# Patient Record
Sex: FEMALE | Race: WHITE | NOT HISPANIC OR LATINO | Employment: FULL TIME | URBAN - METROPOLITAN AREA
[De-identification: names, ages, dates, MRNs, and addresses within clinical notes are randomized per-mention and may not be internally consistent; named-entity substitution may affect disease eponyms.]

---

## 2017-08-15 RX ORDER — BUPROPION HYDROCHLORIDE 75 MG/1
75 TABLET ORAL 2 TIMES DAILY
Qty: 60 TABLET | Refills: 0 | Status: SHIPPED | OUTPATIENT
Start: 2017-08-15 | End: 2017-08-25 | Stop reason: SDUPTHER

## 2017-08-25 ENCOUNTER — OFFICE VISIT (OUTPATIENT)
Dept: FAMILY MEDICINE CLINIC | Facility: CLINIC | Age: 47
End: 2017-08-25

## 2017-08-25 VITALS
OXYGEN SATURATION: 98 % | DIASTOLIC BLOOD PRESSURE: 76 MMHG | HEART RATE: 97 BPM | TEMPERATURE: 97.6 F | SYSTOLIC BLOOD PRESSURE: 118 MMHG | HEIGHT: 65 IN | BODY MASS INDEX: 26.42 KG/M2 | WEIGHT: 158.6 LBS

## 2017-08-25 DIAGNOSIS — G43.839 INTRACTABLE MENSTRUAL MIGRAINE WITHOUT STATUS MIGRAINOSUS: ICD-10-CM

## 2017-08-25 DIAGNOSIS — F41.9 ANXIETY: ICD-10-CM

## 2017-08-25 DIAGNOSIS — Z00.00 WELLNESS EXAMINATION: Primary | ICD-10-CM

## 2017-08-25 LAB
ALBUMIN SERPL-MCNC: 4.2 G/DL (ref 3.2–4.8)
ALBUMIN/GLOB SERPL: 1.3 G/DL (ref 1.5–2.5)
ALP SERPL-CCNC: 65 U/L (ref 25–100)
ALT SERPL-CCNC: 14 U/L (ref 7–40)
AST SERPL-CCNC: 21 U/L (ref 0–33)
BILIRUB SERPL-MCNC: 0.4 MG/DL (ref 0.3–1.2)
BUN SERPL-MCNC: 10 MG/DL (ref 9–23)
BUN/CREAT SERPL: 14.3 (ref 7–25)
CALCIUM SERPL-MCNC: 10.6 MG/DL (ref 8.7–10.4)
CHLORIDE SERPL-SCNC: 106 MMOL/L (ref 99–109)
CHOLEST SERPL-MCNC: 183 MG/DL (ref 0–200)
CO2 SERPL-SCNC: 24 MMOL/L (ref 20–31)
CREAT SERPL-MCNC: 0.7 MG/DL (ref 0.6–1.3)
GLOBULIN SER CALC-MCNC: 3.2 GM/DL
GLUCOSE SERPL-MCNC: 89 MG/DL (ref 70–100)
HDLC SERPL-MCNC: 69 MG/DL (ref 40–60)
LDLC SERPL CALC-MCNC: 99 MG/DL (ref 0–100)
POTASSIUM SERPL-SCNC: 4.5 MMOL/L (ref 3.5–5.5)
PROT SERPL-MCNC: 7.4 G/DL (ref 5.7–8.2)
SODIUM SERPL-SCNC: 141 MMOL/L (ref 132–146)
TRIGL SERPL-MCNC: 77 MG/DL (ref 0–150)
TSH SERPL DL<=0.005 MIU/L-ACNC: 1.63 MIU/ML (ref 0.35–5.35)
VLDLC SERPL CALC-MCNC: 15.4 MG/DL

## 2017-08-25 PROCEDURE — 99396 PREV VISIT EST AGE 40-64: CPT | Performed by: NURSE PRACTITIONER

## 2017-08-25 RX ORDER — SUMATRIPTAN 50 MG/1
TABLET, FILM COATED ORAL
Qty: 8 TABLET | Refills: 1 | Status: SHIPPED | OUTPATIENT
Start: 2017-08-25 | End: 2017-11-17 | Stop reason: SDUPTHER

## 2017-08-25 RX ORDER — BUPROPION HYDROCHLORIDE 75 MG/1
150 TABLET ORAL 2 TIMES DAILY
Qty: 120 TABLET | Refills: 11 | Status: SHIPPED | OUTPATIENT
Start: 2017-08-25 | End: 2017-09-24

## 2017-08-25 NOTE — PROGRESS NOTES
Patient Care Team:  Francy Burleson MD as PCP - General (Rheumatology)     Chief complaint: Patient is in today for a physical     Patient is a 46 y.o. female who presents for her yearly physical exam.     ANGELA Tam is here today for annual physical and to get refills for Wellbutrin and Imitrex.  States she has had vision changes and eye pain with movement since January, has seen a specialist, but no problems have been found, but the plan is to try different prescription lenses, if this does not help, they will refer her to neurology.  Patient has history of uterine ablation, still has monthly periods, but states PAP/pelvic exams are too painful and will not have it done, states she is on her period today. Patient has migraine headaches associated with her menstrual cycle, that are controlled with Imitrex.   Patient has a history of anxiety that is controlled well with Wellbutrin.   Patient has Lupus and Fibromyalgia that are managed by a Rheumatologist. Patient is limited on how much exercise she can do because of pain related to her conditions. She does try to eat healthy diet.   Review of Systems   Constitutional: Negative for activity change, appetite change, chills, diaphoresis, fatigue, fever and unexpected weight change.   HENT: Negative for congestion, ear pain, sinus pressure and sore throat.    Eyes: Positive for pain and visual disturbance (since January, has seen specialist).   Respiratory: Negative for cough, shortness of breath and wheezing.    Cardiovascular: Negative for chest pain, palpitations and leg swelling.   Gastrointestinal: Negative for abdominal distention, abdominal pain, blood in stool, constipation, diarrhea, nausea and vomiting.   Endocrine: Negative for cold intolerance, heat intolerance, polydipsia and polyuria.   Genitourinary: Negative for decreased urine volume, difficulty urinating, dysuria, flank pain, frequency, menstrual problem, pelvic pain, urgency, vaginal discharge  and vaginal pain.   Musculoskeletal: Positive for arthralgias (lupus), back pain and myalgias.   Skin: Negative for color change, pallor, rash and wound.   Allergic/Immunologic: Positive for environmental allergies.   Neurological: Positive for headaches (migraines around monthly period). Negative for dizziness, tremors, seizures, light-headedness and numbness.   Psychiatric/Behavioral: Negative for agitation and behavioral problems. The patient is nervous/anxious.       History  Past Medical History:   Diagnosis Date   • Anxiety    • Arthritis     DDD   • Depression    • Fibromyalgia    • IBS (irritable bowel syndrome)    • Lupus    • Migraine headache    • Tendinitis       Past Surgical History:   Procedure Laterality Date   • CHOLECYSTECTOMY     • DIAGNOSTIC LAPAROSCOPY     • ENDOMETRIAL ABLATION     • LASIK     • MANDIBLE SURGERY      and Reset      Allergies   Allergen Reactions   • No Known Drug Allergy       Family History   Problem Relation Age of Onset   • Multiple sclerosis Mother    • Heart attack Father    • Arthritis Sister    • Depression Daughter    • Obesity Daughter    • No Known Problems Son      Social History     Social History   • Marital status:      Spouse name: N/A   • Number of children: N/A   • Years of education: N/A     Occupational History   • Not on file.     Social History Main Topics   • Smoking status: Never Smoker   • Smokeless tobacco: Never Used   • Alcohol use No   • Drug use: No   • Sexual activity: Yes     Partners: Male     Other Topics Concern   • Not on file     Social History Narrative   • No narrative on file        Current Outpatient Prescriptions:   •  buPROPion (WELLBUTRIN) 75 MG tablet, Take 2 tablets by mouth 2 (Two) Times a Day for 30 days., Disp: 120 tablet, Rfl: 11  •  Cholecalciferol (VITAMIN D) 2000 units capsule, Take  by mouth., Disp: , Rfl:   •  cycloSPORINE (RESTASIS) 0.05 % ophthalmic emulsion, Apply  to eye., Disp: , Rfl:   •  gabapentin (NEURONTIN)  "100 MG capsule, Take 300 mg by mouth Every Night., Disp: , Rfl:   •  Ibuprofen 200 MG capsule, Take  by mouth., Disp: , Rfl:   •  lansoprazole (PREVACID) 30 MG capsule, , Disp: , Rfl: 0  •  pravastatin (PRAVACHOL) 10 MG tablet, Take  by mouth Daily., Disp: , Rfl:   •  SUMAtriptan (IMITREX) 50 MG tablet, Take 1 tablet, may repeat dose in 2 hrs if needed, Disp: 8 tablet, Rfl: 1  •  traMADol (ULTRAM) 50 MG tablet, Take  by mouth 3 (Three) Times a Day., Disp: , Rfl:                                               There is no immunization history on file for this patient.  Health Maintenance   Topic Date Due   • TDAP/TD VACCINES (1 - Tdap) 12/14/1991   • INFLUENZA VACCINE  09/01/2017   • PAP SMEAR  08/25/2020        No results found for this or any previous visit.         /76  Pulse 97  Temp 97.6 °F (36.4 °C) (Temporal Artery )   Ht 65\" (165.1 cm)  Wt 158 lb 9.6 oz (71.9 kg)  LMP 08/24/2017  SpO2 98%  Breastfeeding? No  BMI 26.39 kg/m2      Physical Exam   Constitutional: She is oriented to person, place, and time. She appears well-developed and well-nourished.   HENT:   Head: Normocephalic and atraumatic.   Right Ear: External ear normal.   Left Ear: External ear normal. Tympanic membrane is not bulging.   Nose: Nose normal.   Mouth/Throat: Oropharynx is clear and moist. No oropharyngeal exudate.   Right ear TM is not visible due to cerumen   Eyes: Conjunctivae and EOM are normal. Pupils are equal, round, and reactive to light. No scleral icterus.   Neck: Normal range of motion. Neck supple. No JVD present. No tracheal deviation present. No thyromegaly present.   Cardiovascular: Normal rate, regular rhythm, normal heart sounds and intact distal pulses.  Exam reveals no friction rub.    No murmur heard.  No carotid bruit   Pulmonary/Chest: Effort normal and breath sounds normal. No respiratory distress. She has no wheezes.   Declined breast exam   Abdominal: Soft. Bowel sounds are normal. She exhibits no " distension and no mass. There is no tenderness. No hernia.   35 in waist circumference (needed for work physical)  No aortic or renal artery bruit   Genitourinary:   Genitourinary Comments: Patient declines, is on period at this time   Musculoskeletal: She exhibits no edema, tenderness or deformity.   Lymphadenopathy:     She has no cervical adenopathy.   Neurological: She is alert and oriented to person, place, and time. She has normal reflexes. No cranial nerve deficit. Coordination normal.   Skin: Skin is warm and dry. No rash noted. No erythema. No pallor.   Psychiatric: She has a normal mood and affect. Her behavior is normal. Judgment and thought content normal.   Vitals reviewed.          Counseling provided on diet and nutrition and exercise.    Diagnoses and all orders for this visit:    Wellness examination  -     Comprehensive metabolic panel; Future  -     TSH; Future  -     Lipid panel; Future    Intractable menstrual migraine without status migrainosus  -     SUMAtriptan (IMITREX) 50 MG tablet; Take 1 tablet, may repeat dose in 2 hrs if needed    Anxiety  -     buPROPion (WELLBUTRIN) 75 MG tablet; Take 2 tablets by mouth 2 (Two) Times a Day for 30 days.       Screening labs ordered to evaluate glucose, kidney, liver, and thyroid function.  I will contact patient regarding test results and provide instructions regarding any necessary changes in plan of care.  Migraines are stable, Imitrex refilled today.  Anxiety is stable, Wellbutrin refilled.  I discussed the importance of regular screening exams, encouraged patient to have a PAP especially since uterine ablation often ends uterine bleeding and she is still having periods. Patient verbalized understanding and will consider it.  Nutrition and activity goals reviewed including: mainly water to drink, limit white flour/processed sugar, and processed foods, choose fresh fruits, vegetables, fish, lean meats,high fiber carbs, exercise  working toward 150  mins cardio per week, weight training 2x/week.  I advised patient to use sweet oil drops in right ear at night, use debrox or hydrogen peroxide mixed half and half with warm water to flush out wax or patient may also come to office to have ar irrigation done.  Patient was encouraged to keep me informed of any acute changes, lack of improvement, or any new concerning symptoms.Patient voiced understanding of all instructions and denied further questions.      .VINH Shepard   8/25/2017   1:33 PM

## 2017-08-30 ENCOUNTER — RESULTS ENCOUNTER (OUTPATIENT)
Dept: FAMILY MEDICINE CLINIC | Facility: CLINIC | Age: 47
End: 2017-08-30

## 2017-08-30 DIAGNOSIS — Z00.00 WELLNESS EXAMINATION: ICD-10-CM

## 2017-11-17 DIAGNOSIS — G43.839 INTRACTABLE MENSTRUAL MIGRAINE WITHOUT STATUS MIGRAINOSUS: ICD-10-CM

## 2017-11-17 RX ORDER — SUMATRIPTAN 50 MG/1
TABLET, FILM COATED ORAL
Qty: 8 TABLET | Refills: 1 | Status: SHIPPED | OUTPATIENT
Start: 2017-11-17 | End: 2018-01-22 | Stop reason: SDUPTHER

## 2018-01-22 DIAGNOSIS — G43.839 INTRACTABLE MENSTRUAL MIGRAINE WITHOUT STATUS MIGRAINOSUS: ICD-10-CM

## 2018-01-23 RX ORDER — SUMATRIPTAN 50 MG/1
TABLET, FILM COATED ORAL
Qty: 8 TABLET | Refills: 2 | Status: SHIPPED | OUTPATIENT
Start: 2018-01-23 | End: 2018-05-01 | Stop reason: SDUPTHER

## 2018-05-01 DIAGNOSIS — G43.839 INTRACTABLE MENSTRUAL MIGRAINE WITHOUT STATUS MIGRAINOSUS: ICD-10-CM

## 2018-05-01 RX ORDER — SUMATRIPTAN 50 MG/1
TABLET, FILM COATED ORAL
Qty: 8 TABLET | Refills: 2 | Status: SHIPPED | OUTPATIENT
Start: 2018-05-01 | End: 2018-09-17 | Stop reason: SDUPTHER

## 2018-09-04 DIAGNOSIS — G43.839 INTRACTABLE MENSTRUAL MIGRAINE WITHOUT STATUS MIGRAINOSUS: ICD-10-CM

## 2018-09-04 RX ORDER — SUMATRIPTAN 50 MG/1
TABLET, FILM COATED ORAL
Qty: 8 TABLET | Refills: 2 | OUTPATIENT
Start: 2018-09-04

## 2018-09-13 DIAGNOSIS — F41.9 ANXIETY: ICD-10-CM

## 2018-09-13 RX ORDER — BUPROPION HYDROCHLORIDE 75 MG/1
TABLET ORAL
Qty: 120 TABLET | Refills: 11 | OUTPATIENT
Start: 2018-09-13

## 2018-09-17 ENCOUNTER — OFFICE VISIT (OUTPATIENT)
Dept: FAMILY MEDICINE CLINIC | Facility: CLINIC | Age: 48
End: 2018-09-17

## 2018-09-17 VITALS
HEART RATE: 102 BPM | WEIGHT: 157 LBS | OXYGEN SATURATION: 98 % | BODY MASS INDEX: 26.16 KG/M2 | SYSTOLIC BLOOD PRESSURE: 118 MMHG | DIASTOLIC BLOOD PRESSURE: 78 MMHG | HEIGHT: 65 IN | TEMPERATURE: 98.1 F

## 2018-09-17 DIAGNOSIS — H61.21 IMPACTED CERUMEN OF RIGHT EAR: ICD-10-CM

## 2018-09-17 DIAGNOSIS — G43.839 INTRACTABLE MENSTRUAL MIGRAINE WITHOUT STATUS MIGRAINOSUS: ICD-10-CM

## 2018-09-17 DIAGNOSIS — F41.8 DEPRESSION WITH ANXIETY: Primary | ICD-10-CM

## 2018-09-17 PROCEDURE — 69209 REMOVE IMPACTED EAR WAX UNI: CPT | Performed by: NURSE PRACTITIONER

## 2018-09-17 PROCEDURE — 99214 OFFICE O/P EST MOD 30 MIN: CPT | Performed by: NURSE PRACTITIONER

## 2018-09-17 RX ORDER — SUMATRIPTAN 50 MG/1
50 TABLET, FILM COATED ORAL ONCE AS NEEDED
Qty: 8 TABLET | Refills: 2 | Status: SHIPPED | OUTPATIENT
Start: 2018-09-17 | End: 2018-12-24 | Stop reason: SDUPTHER

## 2018-09-17 RX ORDER — BUPROPION HYDROCHLORIDE 75 MG/1
150 TABLET ORAL 2 TIMES DAILY
Refills: 1 | COMMUNITY
Start: 2018-08-13 | End: 2018-09-17

## 2018-09-17 RX ORDER — BACLOFEN 20 MG/1
20 TABLET ORAL 4 TIMES DAILY
COMMUNITY

## 2018-09-17 RX ORDER — BUPROPION HYDROCHLORIDE 300 MG/1
300 TABLET ORAL DAILY
Qty: 90 TABLET | Refills: 3 | Status: SHIPPED | OUTPATIENT
Start: 2018-09-17 | End: 2019-08-09 | Stop reason: SDUPTHER

## 2018-09-17 NOTE — PROGRESS NOTES
Subjective   Anel Perkins is a 47 y.o. female.   Chief Complaint   Patient presents with   • Depression     Immitrex and wellbutrin refills       History of Present Illness   Patient is here for follow up for migraines, states imitrex manages headaches well, migraines are usually hormone related.  Depression and anxiety   Sees GYN and rheumatology  The following portions of the patient's history were reviewed and updated as appropriate: allergies, current medications, past family history, past medical history, past social history, past surgical history and problem list.    Review of Systems   Constitutional: Positive for fatigue. Negative for activity change, appetite change, chills and fever.   Eyes: Negative for photophobia, pain, discharge, redness and visual disturbance.   Respiratory: Negative for shortness of breath and wheezing.    Cardiovascular: Negative for chest pain and palpitations.   Gastrointestinal: Positive for constipation. Negative for abdominal distention, blood in stool, diarrhea, nausea and vomiting.   Musculoskeletal: Positive for arthralgias and back pain.   Neurological: Positive for headaches. Negative for dizziness, light-headedness and numbness.   Psychiatric/Behavioral: The patient is nervous/anxious (depression and anxiety conttolled).        Objective   Physical Exam   Constitutional: She is oriented to person, place, and time. She appears well-developed and well-nourished.   HENT:   Head: Normocephalic and atraumatic.   Right Ear: External ear normal.   Left Ear: External ear normal.   Nose: Nose normal.   Mouth/Throat: Oropharynx is clear and moist.   Right cerumen impaction   Eyes: Pupils are equal, round, and reactive to light. Conjunctivae and EOM are normal. No scleral icterus.   Neck: No thyroid mass and no thyromegaly present.   Cardiovascular: Normal rate, regular rhythm and normal heart sounds.    No murmur heard.  Pulmonary/Chest: Effort normal and breath sounds normal. No  respiratory distress. She has no wheezes. She has no rales. She exhibits no tenderness.   Neurological: She is alert and oriented to person, place, and time.   Psychiatric: Her speech is normal and behavior is normal. Judgment and thought content normal. Her mood appears not anxious. Her affect is not angry and not inappropriate. Cognition and memory are normal. She does not exhibit a depressed mood. She is attentive.   Nursing note and vitals reviewed.      Assessment/Plan   Anel was seen today for depression.    Diagnoses and all orders for this visit:    Depression with anxiety  -     buPROPion XL (WELLBUTRIN XL) 300 MG 24 hr tablet; Take 1 tablet by mouth Daily.    Intractable menstrual migraine without status migrainosus  -     SUMAtriptan (IMITREX) 50 MG tablet; Take 1 tablet by mouth 1 (One) Time As Needed for Migraine. May repeat dose one time in 2 hours if headache not relieved.    Impacted cerumen of right ear          Ear Cerumen Removal Instrumentation  Date/Time: 9/17/2018 12:38 PM  Performed by: SRINIVAS HALE  Authorized by: SRINIVAS HALE   Ceruminolytics applied: Ceruminolytics applied prior to the procedure.  Location details: right ear  Patient tolerance: Patient tolerated the procedure well with no immediate complications  Comments: Unable to remove impaction, patient to continue sweet oil drops at home, try debrox  Procedure type: irrigation   Sedation:  Patient sedated: no         Depression and anxiety are controlled, wellbutrin refilled as XL for once a day use.  Migraines are controlled, refilled, imitrex  Patient was encouraged to keep me informed of any acute changes, lack of improvement, or any new concerning symptoms.Patient voiced understanding of all instructions and denied further questions.

## 2018-12-24 DIAGNOSIS — G43.839 INTRACTABLE MENSTRUAL MIGRAINE WITHOUT STATUS MIGRAINOSUS: ICD-10-CM

## 2018-12-24 RX ORDER — SUMATRIPTAN 50 MG/1
50 TABLET, FILM COATED ORAL ONCE AS NEEDED
Qty: 8 TABLET | Refills: 1 | Status: SHIPPED | OUTPATIENT
Start: 2018-12-24 | End: 2019-03-15 | Stop reason: SDUPTHER

## 2019-03-15 DIAGNOSIS — G43.839 INTRACTABLE MENSTRUAL MIGRAINE WITHOUT STATUS MIGRAINOSUS: ICD-10-CM

## 2019-03-15 RX ORDER — SUMATRIPTAN 50 MG/1
TABLET, FILM COATED ORAL
Qty: 8 TABLET | Refills: 0 | Status: SHIPPED | OUTPATIENT
Start: 2019-03-15 | End: 2019-04-15 | Stop reason: SDUPTHER

## 2019-04-15 DIAGNOSIS — G43.839 INTRACTABLE MENSTRUAL MIGRAINE WITHOUT STATUS MIGRAINOSUS: ICD-10-CM

## 2019-04-15 RX ORDER — SUMATRIPTAN 50 MG/1
TABLET, FILM COATED ORAL
Qty: 8 TABLET | Refills: 0 | Status: SHIPPED | OUTPATIENT
Start: 2019-04-15 | End: 2019-05-18 | Stop reason: SDUPTHER

## 2019-05-18 DIAGNOSIS — G43.839 INTRACTABLE MENSTRUAL MIGRAINE WITHOUT STATUS MIGRAINOSUS: ICD-10-CM

## 2019-05-20 RX ORDER — SUMATRIPTAN 50 MG/1
TABLET, FILM COATED ORAL
Qty: 8 TABLET | Refills: 0 | Status: SHIPPED | OUTPATIENT
Start: 2019-05-20 | End: 2019-06-21 | Stop reason: SDUPTHER

## 2019-06-21 DIAGNOSIS — G43.839 INTRACTABLE MENSTRUAL MIGRAINE WITHOUT STATUS MIGRAINOSUS: ICD-10-CM

## 2019-06-21 RX ORDER — SUMATRIPTAN 50 MG/1
TABLET, FILM COATED ORAL
Qty: 8 TABLET | Refills: 0 | Status: SHIPPED | OUTPATIENT
Start: 2019-06-21 | End: 2019-08-09 | Stop reason: SDUPTHER

## 2019-07-24 DIAGNOSIS — G43.839 INTRACTABLE MENSTRUAL MIGRAINE WITHOUT STATUS MIGRAINOSUS: ICD-10-CM

## 2019-07-24 RX ORDER — SUMATRIPTAN 50 MG/1
TABLET, FILM COATED ORAL
Qty: 8 TABLET | Refills: 0 | OUTPATIENT
Start: 2019-07-24

## 2019-08-09 ENCOUNTER — OFFICE VISIT (OUTPATIENT)
Dept: FAMILY MEDICINE CLINIC | Facility: CLINIC | Age: 49
End: 2019-08-09

## 2019-08-09 VITALS
SYSTOLIC BLOOD PRESSURE: 98 MMHG | DIASTOLIC BLOOD PRESSURE: 66 MMHG | WEIGHT: 163.4 LBS | HEIGHT: 65 IN | HEART RATE: 99 BPM | BODY MASS INDEX: 27.22 KG/M2 | OXYGEN SATURATION: 99 %

## 2019-08-09 DIAGNOSIS — G43.839 INTRACTABLE MENSTRUAL MIGRAINE WITHOUT STATUS MIGRAINOSUS: ICD-10-CM

## 2019-08-09 DIAGNOSIS — Z00.00 HEALTH CARE MAINTENANCE: Primary | ICD-10-CM

## 2019-08-09 DIAGNOSIS — F41.8 DEPRESSION WITH ANXIETY: ICD-10-CM

## 2019-08-09 PROCEDURE — 99213 OFFICE O/P EST LOW 20 MIN: CPT | Performed by: NURSE PRACTITIONER

## 2019-08-09 RX ORDER — SUMATRIPTAN 50 MG/1
50 TABLET, FILM COATED ORAL ONCE
Qty: 8 TABLET | Refills: 11 | Status: SHIPPED | OUTPATIENT
Start: 2019-08-09 | End: 2019-08-09

## 2019-08-09 RX ORDER — BUPROPION HYDROCHLORIDE 300 MG/1
300 TABLET ORAL DAILY
Qty: 90 TABLET | Refills: 3 | Status: SHIPPED | OUTPATIENT
Start: 2019-08-09 | End: 2020-09-14 | Stop reason: SDUPTHER

## 2019-08-09 NOTE — PROGRESS NOTES
Subjective   Anel Perkins is a 48 y.o. female.   Chief Complaint   Patient presents with   • Depression with Anxiety f/u     Medication refills       History of Present Illness   Patient is here for follow up for migraines associated with her menstrual cycle , has headaches the week before and during her period.  Is on Wellbutrin for depression and anxiety, is working well.   States she sees Dr. Burleson for Lupus and fibromyalgia, sees every 4-6 months, labs every 6 months.   The following portions of the patient's history were reviewed and updated as appropriate: allergies, current medications and problem list.    Review of Systems   Constitutional: Positive for fatigue. Negative for activity change, appetite change, chills, diaphoresis and fever.   Eyes: Negative for visual disturbance.   Respiratory: Negative for cough, shortness of breath and wheezing.    Cardiovascular: Negative for chest pain, palpitations and leg swelling.   Gastrointestinal: Positive for constipation (IBS). Negative for abdominal distention, abdominal pain, blood in stool, diarrhea, nausea and vomiting.   Genitourinary: Negative for difficulty urinating, dysuria and menstrual problem.   Musculoskeletal: Positive for arthralgias, back pain, joint swelling (fingers), myalgias, neck pain and neck stiffness.   Skin: Negative for color change, pallor, rash and wound.   Neurological: Positive for headaches. Negative for dizziness, light-headedness and numbness.   Psychiatric/Behavioral: Positive for sleep disturbance. Negative for self-injury and suicidal ideas. The patient is nervous/anxious (controlled anxiety and depression).        Objective   Physical Exam   Constitutional: She is oriented to person, place, and time. She appears well-developed and well-nourished. No distress.   HENT:   Head: Normocephalic and atraumatic.   Right Ear: External ear normal.   Left Ear: External ear normal.   Eyes: Conjunctivae and EOM are normal. Pupils are equal,  round, and reactive to light. No scleral icterus.   Cardiovascular: Normal rate, regular rhythm and normal heart sounds.   No murmur heard.  Pulmonary/Chest: Effort normal and breath sounds normal. No stridor. No respiratory distress.   Neurological: She is alert and oriented to person, place, and time. No cranial nerve deficit.   Skin: Skin is warm and dry. She is not diaphoretic.   Psychiatric: She has a normal mood and affect. Her behavior is normal.   Vitals reviewed.      Assessment/Plan   Anel was seen today for migraines.    Diagnoses and all orders for this visit:    Health care maintenance  -     Lipid Panel; Future  -     TSH; Future  -     Comprehensive Metabolic Panel; Future    Intractable menstrual migraine without status migrainosus  -     SUMAtriptan (IMITREX) 50 MG tablet; Take 1 tablet by mouth 1 (One) Time for 1 dose. May repeat dose in 2 hours as needed    Depression with anxiety  -     buPROPion XL (WELLBUTRIN XL) 300 MG 24 hr tablet; Take 1 tablet by mouth Daily.        Screening labs ordered, will also request labs from Dr. Burleson  Migraines are controlled with Imitrex, scrip refilled  Depression and anxiety are controlled, refilled Wellbutrin.  Encouraged patient to schedule an annual physical. Explained she needs to be seen at least once a year for med refills.  Patient was encouraged to keep me informed of any acute changes, lack of improvement, or any new concerning symptoms.

## 2019-08-23 LAB
ALBUMIN SERPL-MCNC: 4.5 G/DL (ref 3.5–5.5)
ALBUMIN/GLOB SERPL: 1.7 {RATIO} (ref 1.2–2.2)
ALP SERPL-CCNC: 75 IU/L (ref 39–117)
ALT SERPL-CCNC: 13 IU/L (ref 0–32)
AMBIG ABBREV CMP14 DEFAULT: NORMAL
AMBIG ABBREV LP DEFAULT: NORMAL
AST SERPL-CCNC: 20 IU/L (ref 0–40)
BILIRUB SERPL-MCNC: 0.2 MG/DL (ref 0–1.2)
BUN SERPL-MCNC: 12 MG/DL (ref 6–24)
BUN/CREAT SERPL: 16 (ref 9–23)
CALCIUM SERPL-MCNC: 9.9 MG/DL (ref 8.7–10.2)
CHLORIDE SERPL-SCNC: 103 MMOL/L (ref 96–106)
CHOLEST SERPL-MCNC: 188 MG/DL (ref 100–199)
CO2 SERPL-SCNC: 25 MMOL/L (ref 20–29)
CREAT SERPL-MCNC: 0.74 MG/DL (ref 0.57–1)
GLOBULIN SER CALC-MCNC: 2.6 G/DL (ref 1.5–4.5)
GLUCOSE SERPL-MCNC: 84 MG/DL (ref 65–99)
HDLC SERPL-MCNC: 69 MG/DL
LDLC SERPL CALC-MCNC: 104 MG/DL (ref 0–99)
POTASSIUM SERPL-SCNC: 4.8 MMOL/L (ref 3.5–5.2)
PROT SERPL-MCNC: 7.1 G/DL (ref 6–8.5)
SODIUM SERPL-SCNC: 143 MMOL/L (ref 134–144)
TRIGL SERPL-MCNC: 76 MG/DL (ref 0–149)
TSH SERPL DL<=0.005 MIU/L-ACNC: 1.89 UIU/ML (ref 0.45–4.5)
VLDLC SERPL CALC-MCNC: 15 MG/DL (ref 5–40)

## 2020-08-29 DIAGNOSIS — F41.8 DEPRESSION WITH ANXIETY: ICD-10-CM

## 2020-08-31 RX ORDER — BUPROPION HYDROCHLORIDE 300 MG/1
TABLET ORAL
Qty: 90 TABLET | Refills: 3 | OUTPATIENT
Start: 2020-08-31

## 2020-09-02 RX ORDER — SUMATRIPTAN 50 MG/1
TABLET, FILM COATED ORAL
Qty: 8 TABLET | OUTPATIENT
Start: 2020-09-02

## 2020-09-14 ENCOUNTER — OFFICE VISIT (OUTPATIENT)
Dept: FAMILY MEDICINE CLINIC | Facility: CLINIC | Age: 50
End: 2020-09-14

## 2020-09-14 ENCOUNTER — LAB (OUTPATIENT)
Dept: LAB | Facility: HOSPITAL | Age: 50
End: 2020-09-14

## 2020-09-14 VITALS
WEIGHT: 163.6 LBS | BODY MASS INDEX: 27.26 KG/M2 | SYSTOLIC BLOOD PRESSURE: 110 MMHG | HEIGHT: 65 IN | OXYGEN SATURATION: 99 % | HEART RATE: 83 BPM | DIASTOLIC BLOOD PRESSURE: 78 MMHG

## 2020-09-14 DIAGNOSIS — Z00.00 ROUTINE MEDICAL EXAM: Primary | ICD-10-CM

## 2020-09-14 DIAGNOSIS — M79.7 FIBROMYALGIA: ICD-10-CM

## 2020-09-14 DIAGNOSIS — F41.8 DEPRESSION WITH ANXIETY: ICD-10-CM

## 2020-09-14 DIAGNOSIS — G43.009 MIGRAINE WITHOUT AURA AND WITHOUT STATUS MIGRAINOSUS, NOT INTRACTABLE: ICD-10-CM

## 2020-09-14 DIAGNOSIS — M32.9 SLE (SYSTEMIC LUPUS ERYTHEMATOSUS RELATED SYNDROME) (HCC): ICD-10-CM

## 2020-09-14 DIAGNOSIS — Z00.00 ROUTINE MEDICAL EXAM: ICD-10-CM

## 2020-09-14 DIAGNOSIS — Z11.59 ENCOUNTER FOR HEPATITIS C SCREENING TEST FOR LOW RISK PATIENT: ICD-10-CM

## 2020-09-14 LAB
CHOLEST SERPL-MCNC: 190 MG/DL (ref 0–200)
HCV AB SER DONR QL: NORMAL
HDLC SERPL-MCNC: 69 MG/DL (ref 40–60)
LDLC SERPL CALC-MCNC: 104 MG/DL (ref 0–100)
LDLC/HDLC SERPL: 1.51 {RATIO}
TRIGL SERPL-MCNC: 84 MG/DL (ref 0–150)
TSH SERPL DL<=0.05 MIU/L-ACNC: 2.29 UIU/ML (ref 0.27–4.2)
VLDLC SERPL-MCNC: 16.8 MG/DL (ref 5–40)

## 2020-09-14 PROCEDURE — 80061 LIPID PANEL: CPT

## 2020-09-14 PROCEDURE — 84443 ASSAY THYROID STIM HORMONE: CPT

## 2020-09-14 PROCEDURE — 99396 PREV VISIT EST AGE 40-64: CPT | Performed by: PHYSICIAN ASSISTANT

## 2020-09-14 PROCEDURE — 86803 HEPATITIS C AB TEST: CPT

## 2020-09-14 RX ORDER — BUPROPION HYDROCHLORIDE 300 MG/1
300 TABLET ORAL DAILY
Qty: 90 TABLET | Refills: 3 | Status: SHIPPED | OUTPATIENT
Start: 2020-09-14 | End: 2021-09-07

## 2020-09-14 RX ORDER — RIZATRIPTAN BENZOATE 10 MG/1
10 TABLET ORAL ONCE AS NEEDED
Qty: 9 TABLET | Refills: 3 | Status: SHIPPED | OUTPATIENT
Start: 2020-09-14 | End: 2021-03-16

## 2020-09-14 RX ORDER — SUMATRIPTAN 50 MG/1
TABLET, FILM COATED ORAL
Status: CANCELLED | OUTPATIENT
Start: 2020-09-14

## 2020-09-14 RX ORDER — NADOLOL 20 MG/1
20 TABLET ORAL DAILY
Qty: 30 TABLET | Refills: 5 | Status: SHIPPED | OUTPATIENT
Start: 2020-09-14 | End: 2022-10-24

## 2020-09-14 NOTE — PROGRESS NOTES
"Reason for visit    Anel Perkins is a 49 y.o. female who presents for annual comprehensive exam.    Chief Complaint   Patient presents with   • Annual Exam     patient is not fasting this morning       HPI     Here for physical.   Recently had blood work done at Dr. Camp office (rheumatology).  Requesting refill imitrex and wellbutrin today.  Mood has been \"fine\" on medication.   Headaches have been increased she believes due to the weather. Different than usual hormonal headaches she gets. She had 8 migraines in the last month, typically 4-5 monthly. With migraines: sensitivity to light, nausea. Last a few hours to all day. Most of the time imitrex relieves migraines. Previously on maxalt which worked better 4 years ago but insurance would not cover. Took nadolol around 20 years ago with benefit.   Pain in IT bands interferes with sleep. Doing home exercise, biofreeze, massage.      Diet/Physical activity:  -Eats a \"pretty health diet. Wants to try and reduce carbs  -Does not exercise at this time. Wants to get into the gym when work position changes.     Immunizations:  -Cannot recall last tetanus vaccine  -Declines influenza vaccine    Cancer screening:   -Negative Fhx colon cancer, polyps, breast cancer, ovarian cancer    Depression: PHQ-2 Depression Screening  Little interest or pleasure in doing things? 0   Feeling down, depressed, or hopeless? 0   PHQ-2 Total Score 0      Substance use:  -No tobacco, drug, or alcohol use  -1 cup tea for lunch/day    Sexual health:  -Monogamous relationship x 24 yrs  -No hx of STIs  -Regular menstrual cycles, LMP yesterday  -Sees gynecologist, it has been a couple years, PAP normal at that time  -Mammogram several years ago, no hx abnormals    Intimate partner violence   -Lives with  and children  -Feel safe at home    AAA:   -MGF had abdominal aneurysm    Dental/vision:  -Current with routine exams     Past Medical History:   Diagnosis Date   • Anxiety    • Arthritis "     DDD   • Depression    • Fibromyalgia    • IBS (irritable bowel syndrome)    • Lupus (CMS/HCC)    • Migraine headache    • Tendinitis        Past Surgical History:   Procedure Laterality Date   • CHOLECYSTECTOMY     • DIAGNOSTIC LAPAROSCOPY     • ENDOMETRIAL ABLATION     • LASIK     • MANDIBLE SURGERY      and Reset       Family History   Problem Relation Age of Onset   • Multiple sclerosis Mother    • Heart attack Father    • Arthritis Sister    • Depression Daughter    • Obesity Daughter    • No Known Problems Son        Social History     Socioeconomic History   • Marital status:      Spouse name: Not on file   • Number of children: Not on file   • Years of education: Not on file   • Highest education level: Not on file   Tobacco Use   • Smoking status: Never Smoker   • Smokeless tobacco: Never Used   Substance and Sexual Activity   • Alcohol use: No   • Drug use: No   • Sexual activity: Yes     Partners: Male       Allergies   Allergen Reactions   • No Known Drug Allergy        ROS    Review of Systems   Constitutional: Positive for fatigue (chronic x 10 yrs). Negative for appetite change, chills, diaphoresis, fever, unexpected weight gain and unexpected weight loss.   HENT: Negative for congestion, hearing loss, nosebleeds, sore throat, swollen glands and trouble swallowing.    Eyes: Negative for blurred vision and visual disturbance.   Respiratory: Negative for cough, choking, shortness of breath and wheezing.    Cardiovascular: Negative for chest pain, palpitations and leg swelling.   Gastrointestinal: Negative for abdominal pain, blood in stool, constipation, diarrhea and GERD.   Endocrine: Negative for polydipsia and polyuria.   Genitourinary: Negative for breast discharge, breast lump, breast pain, difficulty urinating, dysuria, hematuria, pelvic pain and vaginal pain.   Musculoskeletal: Positive for arthralgias and myalgias. Negative for gait problem.   Skin: Negative for rash and skin lesions.    Allergic/Immunologic: Positive for environmental allergies.   Neurological: Positive for headache. Negative for dizziness, syncope, light-headedness, numbness and memory problem.   Hematological: Negative for adenopathy. Does not bruise/bleed easily.   Psychiatric/Behavioral: Positive for sleep disturbance. Negative for depressed mood. The patient is not nervous/anxious.        Vitals:    09/14/20 0819   BP: 110/78   Pulse: 83   SpO2: 99%     Body mass index is 27.22 kg/m².      Current Outpatient Medications:   •  baclofen (LIORESAL) 20 MG tablet, Take 20 mg by mouth 4 (Four) Times a Day., Disp: , Rfl:   •  buPROPion XL (Wellbutrin XL) 300 MG 24 hr tablet, Take 1 tablet by mouth Daily., Disp: 90 tablet, Rfl: 3  •  Cholecalciferol (VITAMIN D) 2000 units capsule, Take  by mouth., Disp: , Rfl:   •  gabapentin (NEURONTIN) 100 MG capsule, Take 300 mg by mouth Every Night., Disp: , Rfl:   •  Ibuprofen 200 MG capsule, Take  by mouth., Disp: , Rfl:   •  lansoprazole (PREVACID) 30 MG capsule, , Disp: , Rfl: 0  •  traMADol (ULTRAM) 50 MG tablet, Take  by mouth 3 (Three) Times a Day., Disp: , Rfl:   •  cycloSPORINE (RESTASIS) 0.05 % ophthalmic emulsion, Apply  to eye., Disp: , Rfl:   •  nadolol (Corgard) 20 MG tablet, Take 1 tablet by mouth Daily., Disp: 30 tablet, Rfl: 5  •  rizatriptan (Maxalt) 10 MG tablet, Take 1 tablet by mouth 1 (One) Time As Needed for Migraine for up to 1 dose. May repeat in 2 hours if needed, Disp: 9 tablet, Rfl: 3    PE    Physical Exam  Vitals signs reviewed.   Constitutional:       General: She is not in acute distress.     Appearance: Normal appearance. She is well-developed.   HENT:      Head: Normocephalic and atraumatic.      Right Ear: Hearing, tympanic membrane and ear canal normal.      Left Ear: Hearing, tympanic membrane and ear canal normal.      Nose: Nose normal.      Mouth/Throat:      Dentition: Normal dentition.   Eyes:      Conjunctiva/sclera: Conjunctivae normal.      Pupils:  Pupils are equal, round, and reactive to light.   Neck:      Musculoskeletal: Normal range of motion and neck supple.      Thyroid: No thyroid mass or thyromegaly.      Vascular: No carotid bruit.   Cardiovascular:      Rate and Rhythm: Normal rate and regular rhythm.      Heart sounds: Normal heart sounds, S1 normal and S2 normal. No murmur.   Pulmonary:      Effort: Pulmonary effort is normal.      Breath sounds: Normal breath sounds.   Abdominal:      General: Bowel sounds are normal. There is no abdominal bruit.      Palpations: Abdomen is soft. There is no mass.      Tenderness: There is no abdominal tenderness.   Musculoskeletal: Normal range of motion.   Lymphadenopathy:      Cervical: No cervical adenopathy.      Upper Body:      Right upper body: No supraclavicular adenopathy.      Left upper body: No supraclavicular adenopathy.   Skin:     General: Skin is warm and dry.      Findings: No rash.      Nails: There is no clubbing.        Comments: No suspicious nevi on clothed exam   Neurological:      Mental Status: She is alert.      Gait: Gait normal.      Deep Tendon Reflexes: Reflexes normal.   Psychiatric:         Speech: Speech normal.         Behavior: Behavior normal.         A/P    Problem List Items Addressed This Visit        Nervous and Auditory    Fibromyalgia  -Continue management per rheumatology  -Sees Dr. Burleson       Musculoskeletal and Integument    SLE (systemic lupus erythematosus related syndrome) (CMS/[facility])    Relevant Medications    Ibuprofen 200 MG capsule      Other Visit Diagnoses     Routine medical exam    -  Primary  -Counseled patient regarding cancer screening, immunizations, healthy lifestyle, diet, maintaining a healthy weight, and exercise.  -Annual dental, eye, and gynecologic exams were recommended.   -Counseled patient to call make an appointment with gynecology for routine exam  -Declines mammogram, influenza vaccine, Tdap  -She would like to defer colonoscopy until age 52,  average risk    Relevant Orders    Lipid Panel    TSH Rfx On Abnormal To Free T4    Depression with anxiety      -Doing well  Refill Wellbutrin    Relevant Medications    buPROPion XL (Wellbutrin XL) 300 MG 24 hr tablet    Encounter for hepatitis C screening test for low risk patient        Relevant Orders    Hepatitis C Antibody    Migraine without aura and without status migrainosus, not intractable      -Change sumatriptan to rizatriptan  -Resume low-dose Corgard for migraine prevention  -Return to clinic in 6 weeks for follow-up with Marlyn    Relevant Medications    buPROPion XL (Wellbutrin XL) 300 MG 24 hr tablet    rizatriptan (Maxalt) 10 MG tablet    nadolol (Corgard) 20 MG tablet          Plan of care was reviewed with patient at the conclusion of today's visit. Education was provided regarding diagnoses, management, treatment plan, and the importance of keeping follow-up appointments. The patient was counseled regarding the risks, benefits, and possible side-effects of treatment. Patient and/or family expresses understanding and agreement with the management plan.        VENU Orellana

## 2020-10-27 ENCOUNTER — OFFICE VISIT (OUTPATIENT)
Dept: FAMILY MEDICINE CLINIC | Facility: CLINIC | Age: 50
End: 2020-10-27

## 2020-10-27 VITALS
RESPIRATION RATE: 18 BRPM | BODY MASS INDEX: 27.32 KG/M2 | HEART RATE: 80 BPM | OXYGEN SATURATION: 100 % | HEIGHT: 65 IN | SYSTOLIC BLOOD PRESSURE: 92 MMHG | WEIGHT: 164 LBS | DIASTOLIC BLOOD PRESSURE: 60 MMHG

## 2020-10-27 DIAGNOSIS — J30.1 SEASONAL ALLERGIC RHINITIS DUE TO POLLEN: ICD-10-CM

## 2020-10-27 DIAGNOSIS — R51.9 NONINTRACTABLE HEADACHE, UNSPECIFIED CHRONICITY PATTERN, UNSPECIFIED HEADACHE TYPE: ICD-10-CM

## 2020-10-27 DIAGNOSIS — G43.829 MENSTRUAL MIGRAINE WITHOUT STATUS MIGRAINOSUS, NOT INTRACTABLE: Primary | ICD-10-CM

## 2020-10-27 DIAGNOSIS — H61.21 IMPACTED CERUMEN OF RIGHT EAR: ICD-10-CM

## 2020-10-27 PROCEDURE — 99213 OFFICE O/P EST LOW 20 MIN: CPT | Performed by: NURSE PRACTITIONER

## 2020-10-27 RX ORDER — FLUTICASONE PROPIONATE 50 MCG
2 SPRAY, SUSPENSION (ML) NASAL DAILY
Qty: 1 BOTTLE | Refills: 11 | Status: SHIPPED | OUTPATIENT
Start: 2020-10-27 | End: 2022-11-08

## 2020-10-27 NOTE — PROGRESS NOTES
"Subjective   Anel Perkins is a 49 y.o. female.   Chief Complaint   Patient presents with   • Med Refill     follow up for new medications       History of Present Illness   Patient is here for follow up for HA,  migraines worse around periods, is a chronic issue, weather makes regular headaches worse. Was started on corgard in addition to maxalt. Denies increased stress. Has had migraines for years, but seems to have worsened over last few months. States most of the time the migraines will go away with one dose of maxalt, denies blurry vision or dizziness. Migraines assoc with hormones are \"always in back of head. Weather change headache occurs in eyes feels like a vice. Allergy meds help.   In one week 2-3 headaches, tylenol and ibuprofen help  She is followed by ENT, is scheduled with them,they manage recurrent cerumen impaction  She has seen at least 43 eye specialists for recurrent headache around eyes, no problem found, has not had imaging of head.  The following portions of the patient's history were reviewed and updated as appropriate: allergies, current medications, past family history, past medical history, past social history, past surgical history and problem list.    Review of Systems   HENT: Positive for postnasal drip, rhinorrhea, sinus pressure, sneezing and sore throat. Negative for congestion and ear pain.    Eyes: Positive for photophobia (occ) and pain (has been evaluated by 4 eye specialists). Negative for discharge, redness, itching and visual disturbance.   Respiratory: Negative for shortness of breath.    Cardiovascular: Negative for chest pain.   Endocrine: Negative for cold intolerance and heat intolerance.   Allergic/Immunologic: Positive for environmental allergies.   Neurological: Positive for headaches. Negative for dizziness and light-headedness.       Objective   Physical Exam  Vitals signs reviewed.   Constitutional:       General: She is not in acute distress.     Appearance: Normal " "appearance. She is not ill-appearing, toxic-appearing or diaphoretic.   HENT:      Head: Normocephalic and atraumatic.      Right Ear: There is impacted cerumen.   Eyes:      Extraocular Movements: Extraocular movements intact.      Conjunctiva/sclera: Conjunctivae normal.      Pupils: Pupils are equal, round, and reactive to light.   Cardiovascular:      Rate and Rhythm: Normal rate and regular rhythm.      Pulses: Normal pulses.      Heart sounds: Normal heart sounds.   Pulmonary:      Effort: Pulmonary effort is normal. No respiratory distress.      Breath sounds: Normal breath sounds. No wheezing or rales.   Neurological:      Mental Status: She is alert and oriented to person, place, and time.      Cranial Nerves: No cranial nerve deficit.      Motor: No weakness.      Coordination: Coordination normal.   Psychiatric:         Mood and Affect: Mood normal.         Behavior: Behavior normal.         Thought Content: Thought content normal.         Judgment: Judgment normal.       BP 92/60 (BP Location: Left arm, Patient Position: Sitting, Cuff Size: Adult)   Pulse 80   Resp 18   Ht 165.1 cm (65\")   Wt 74.4 kg (164 lb)   SpO2 100%   BMI 27.29 kg/m²     Assessment/Plan   Diagnoses and all orders for this visit:    1. Menstrual migraine without status migrainosus, not intractable (Primary)    2. Nonintractable headache, unspecified chronicity pattern, unspecified headache type    3. Seasonal allergic rhinitis due to pollen  -     fluticasone (Flonase) 50 MCG/ACT nasal spray; 2 sprays into the nostril(s) as directed by provider Daily.  Dispense: 1 bottle; Refill: 11    4. Impacted cerumen of right ear      Migraines are stable, continue maxalt  Stop corgard, since not effective for headaches, wean off over a week   Take daily antihistamine along with Flonase for possible sinus headache  If headaches do not improve, order Head CT, patient declines order for this today. Consider neurology referral, declined " today.  Patient was encouraged to keep me informed of any acute changes, lack of improvement, or any new concerning symptoms.  Follow up with ENT for cerumen impaction

## 2020-10-27 NOTE — PATIENT INSTRUCTIONS
Allergic Rhinitis, Adult  Allergic rhinitis is a reaction to allergens in the air. Allergens are tiny specks (particles) in the air that cause your body to have an allergic reaction. This condition cannot be passed from person to person (is not contagious). Allergic rhinitis cannot be cured, but it can be controlled.  There are two types of allergic rhinitis:  · Seasonal. This type is also called hay fever. It happens only during certain times of the year.  · Perennial. This type can happen at any time of the year.  What are the causes?  This condition may be caused by:  · Pollen from grasses, trees, and weeds.  · House dust mites.  · Pet dander.  · Mold.  What are the signs or symptoms?  Symptoms of this condition include:  · Sneezing.  · Runny or stuffy nose (nasal congestion).  · A lot of mucus in the back of the throat (postnasal drip).  · Itchy nose.  · Tearing of the eyes.  · Trouble sleeping.  · Being sleepy during day.  How is this treated?  There is no cure for this condition. You should avoid things that trigger your symptoms (allergens). Treatment can help to relieve symptoms. This may include:  · Medicines that block allergy symptoms, such as antihistamines. These may be given as a shot, nasal spray, or pill.  · Shots that are given until your body becomes less sensitive to the allergen (desensitization).  · Stronger medicines, if all other treatments have not worked.  Follow these instructions at home:  Avoiding allergens    · Find out what you are allergic to. Common allergens include smoke, dust, and pollen.  · Avoid them if you can. These are some of the things that you can do to avoid allergens:  ? Replace carpet with wood, tile, or vinyl floyd. Carpet can trap dander and dust.  ? Clean any mold found in the home.  ? Do not smoke. Do not allow smoking in your home.  ? Change your heating and air conditioning filter at least once a month.  ? During allergy season:  § Keep windows closed as much as  you can. If possible, use air conditioning when there is a lot of pollen in the air.  § Use a special filter for allergies with your furnace and air conditioner.  § Plan outdoor activities when pollen counts are lowest. This is usually during the early morning or evening hours.  § If you do go outdoors when pollen count is high, wear a special mask for people with allergies.  § When you come indoors, take a shower and change your clothes before sitting on furniture or bedding.  General instructions  · Do not use fans in your home.  · Do not hang clothes outside to dry.  · Wear sunglasses to keep pollen out of your eyes.  · Wash your hands right away after you touch household pets.  · Take over-the-counter and prescription medicines only as told by your doctor.  · Keep all follow-up visits as told by your doctor. This is important.  Contact a doctor if:  · You have a fever.  · You have a cough that does not go away (is persistent).  · You start to make whistling sounds when you breathe (wheeze).  · Your symptoms do not get better with treatment.  · You have thick fluid coming from your nose.  · You start to have nosebleeds.  Get help right away if:  · Your tongue or your lips are swollen.  · You have trouble breathing.  · You feel dizzy or you feel like you are going to pass out (faint).  · You have cold sweats.  Summary  · Allergic rhinitis is a reaction to allergens in the air.  · This condition may be caused by allergens. These include pollen, dust mites, pet dander, and mold.  · Symptoms include a runny, itchy nose, sneezing, or tearing eyes. You may also have trouble sleeping or feel sleepy during the day.  · Treatment includes taking medicines and avoiding allergens. You may also get shots or take stronger medicines.  · Get help if you have a fever or a cough that does not stop. Get help right away if you are short of breath.  This information is not intended to replace advice given to you by your health care  provider. Make sure you discuss any questions you have with your health care provider.  Document Released: 04/18/2012 Document Revised: 04/07/2020 Document Reviewed: 07/09/2019  Elsevier Patient Education © 2020 [employer]    General Headache Without Cause  A headache is pain or discomfort that is felt around the head or neck area. There are many causes and types of headaches. In some cases, the cause may not be found.  Follow these instructions at home:  Watch your condition for any changes. Let your doctor know about them. Take these steps to help with your condition:  Managing pain         · Take over-the-counter and prescription medicines only as told by your doctor.  · Lie down in a dark, quiet room when you have a headache.  · If told, put ice on your head and neck area:  ? Put ice in a plastic bag.  ? Place a towel between your skin and the bag.  ? Leave the ice on for 20 minutes, 2-3 times per day.  · If told, put heat on the affected area. Use the heat source that your doctor recommends, such as a moist heat pack or a heating pad.  ? Place a towel between your skin and the heat source.  ? Leave the heat on for 20-30 minutes.  ? Remove the heat if your skin turns bright red. This is very important if you are unable to feel pain, heat, or cold. You may have a greater risk of getting burned.  · Keep lights dim if bright lights bother you or make your headaches worse.  Eating and drinking  · Eat meals on a regular schedule.  · If you drink alcohol:  ? Limit how much you use to:  § 0-1 drink a day for women.  § 0-2 drinks a day for men.  ? Be aware of how much alcohol is in your drink. In the [place] one drink equals one 12 oz bottle of beer (355 mL), one 5 oz glass of wine (148 mL), or one 1½ oz glass of hard liquor (44 mL).  · Stop drinking caffeine, or reduce how much caffeine you drink.  General instructions    · Keep a journal to find out if certain things bring on headaches. For example, write  down:  ? What you eat and drink.  ? How much sleep you get.  ? Any change to your diet or medicines.  · Get a massage or try other ways to relax.  · Limit stress.  · Sit up straight. Do not tighten (tense) your muscles.  · Do not use any products that contain nicotine or tobacco. This includes cigarettes, e-cigarettes, and chewing tobacco. If you need help quitting, ask your doctor.  · Exercise regularly as told by your doctor.  · Get enough sleep. This often means 7-9 hours of sleep each night.  · Keep all follow-up visits as told by your doctor. This is important.  Contact a doctor if:  · Your symptoms are not helped by medicine.  · You have a headache that feels different than the other headaches.  · You feel sick to your stomach (nauseous) or you throw up (vomit).  · You have a fever.  Get help right away if:  · Your headache gets very bad quickly.  · Your headache gets worse after a lot of physical activity.  · You keep throwing up.  · You have a stiff neck.  · You have trouble seeing.  · You have trouble speaking.  · You have pain in the eye or ear.  · Your muscles are weak or you lose muscle control.  · You lose your balance or have trouble walking.  · You feel like you will pass out (faint) or you pass out.  · You are mixed up (confused).  · You have a seizure.  Summary  · A headache is pain or discomfort that is felt around the head or neck area.  · There are many causes and types of headaches. In some cases, the cause may not be found.  · Keep a journal to help find out what causes your headaches. Watch your condition for any changes. Let your doctor know about them.  · Contact a doctor if you have a headache that is different from usual, or if your headache is not helped by medicine.  · Get help right away if your headache gets very bad, you throw up, you have trouble seeing, you lose your balance, or you have a seizure.  This information is not intended to replace advice given to you by your health care  provider. Make sure you discuss any questions you have with your health care provider.  Document Released: 09/26/2009 Document Revised: 07/08/2019 Document Reviewed: 07/08/2019  Elsevier Patient Education © 2020 [employer]  Form - Headache Record  There are many types and causes of headaches. A headache record can help guide your treatment plan. Use this form to record the details. Bring this form with you to your follow-up visits.  Follow your health care provider's instructions on how to describe your headache. You may be asked to:  · Use a pain scale. This is a tool to rate the intensity of your headache using words or numbers.  · Describe what your headache feels like, such as dull, achy, throbbing, or sharp.  Headache record  Date: _______________ Time (from start to end): ____________________ Location of the headache: _________________________  · Intensity of the headache: ____________________ Description of the headache: ______________________________________________________________  · Hours of sleep the night before the headache: __________  · Food or drinks before the headache started: ______________________________________________________________________________________  · Events before the headache started: _______________________________________________________________________________________________  · Symptoms before the headache started: __________________________________________________________________________________________  · Symptoms during the headache: __________________________________________________________________________________________________  · Treatment: ________________________________________________________________________________________________________________  · Effect of treatment: _________________________________________________________________________________________________________  · Other comments:  ___________________________________________________________________________________________________________  Date: _______________ Time (from start to end): ____________________ Location of the headache: _________________________  · Intensity of the headache: ____________________ Description of the headache: ______________________________________________________________  · Hours of sleep the night before the headache: __________  · Food or drinks before the headache started: ______________________________________________________________________________________  · Events before the headache started: ____________________________________________________________________________________________  · Symptoms before the headache started: _________________________________________________________________________________________  · Symptoms during the headache: _______________________________________________________________________________________________  · Treatment: ________________________________________________________________________________________________________________  · Effect of treatment: _________________________________________________________________________________________________________  · Other comments: ___________________________________________________________________________________________________________  Date: _______________ Time (from start to end): ____________________ Location of the headache: _________________________  · Intensity of the headache: ____________________ Description of the headache: ______________________________________________________________  · Hours of sleep the night before the headache: __________  · Food or drinks before the headache started: ______________________________________________________________________________________  · Events before the headache started: ____________________________________________________________________________________________  · Symptoms  before the headache started: _________________________________________________________________________________________  · Symptoms during the headache: _______________________________________________________________________________________________  · Treatment: ________________________________________________________________________________________________________________  · Effect of treatment: _________________________________________________________________________________________________________  · Other comments: ___________________________________________________________________________________________________________  Date: _______________ Time (from start to end): ____________________ Location of the headache: _________________________  · Intensity of the headache: ____________________ Description of the headache: ______________________________________________________________  · Hours of sleep the night before the headache: _________  · Food or drinks before the headache started: ______________________________________________________________________________________  · Events before the headache started: ____________________________________________________________________________________________  · Symptoms before the headache started: _________________________________________________________________________________________  · Symptoms during the headache: _______________________________________________________________________________________________  · Treatment: ________________________________________________________________________________________________________________  · Effect of treatment: _________________________________________________________________________________________________________  · Other comments: ___________________________________________________________________________________________________________  Date: _______________ Time (from start to end): ____________________ Location of  the headache: _________________________  · Intensity of the headache: ____________________ Description of the headache: ______________________________________________________________  · Hours of sleep the night before the headache: _________  · Food or drinks before the headache started: ______________________________________________________________________________________  · Events before the headache started: ____________________________________________________________________________________________  · Symptoms before the headache started: _________________________________________________________________________________________  · Symptoms during the headache: _______________________________________________________________________________________________  · Treatment: ________________________________________________________________________________________________________________  · Effect of treatment: _________________________________________________________________________________________________________  · Other comments: ___________________________________________________________________________________________________________  This information is not intended to replace advice given to you by your health care provider. Make sure you discuss any questions you have with your health care provider.  Document Released: 01/06/2020 Document Revised: 01/06/2020 Document Reviewed: 01/06/2020  Elsevier Patient Education © 2020 [employer] Inc[employer]    Migraine Headache  A migraine headache is a very strong throbbing pain on one side or both sides of your head. This type of headache can also cause other symptoms. It can last from 4 hours to 3 days. Talk with your doctor about what things may bring on (trigger) this condition.  What are the causes?  The exact cause of this condition is not known. This condition may be triggered or caused by:  · Drinking alcohol.  · Smoking.  · Taking medicines, such as:  ? Medicine used to  treat chest pain (nitroglycerin).  ? Birth control pills.  ? Estrogen.  ? Some blood pressure medicines.  · Eating or drinking certain products.  · Doing physical activity.  Other things that may trigger a migraine headache include:  · Having a menstrual period.  · Pregnancy.  · Hunger.  · Stress.  · Not getting enough sleep or getting too much sleep.  · Weather changes.  · Tiredness (fatigue).  What increases the risk?  · Being 25-55 years old.  · Being female.  · Having a family history of migraine headaches.  · Being .  · Having depression or anxiety.  · Being very overweight.  What are the signs or symptoms?  · A throbbing pain. This pain may:  ? Happen in any area of the head, such as on one side or both sides.  ? Make it hard to do daily activities.  ? Get worse with physical activity.  ? Get worse around bright lights or loud noises.  · Other symptoms may include:  ? Feeling sick to your stomach (nauseous).  ? Vomiting.  ? Dizziness.  ? Being sensitive to bright lights, loud noises, or smells.  · Before you get a migraine headache, you may get warning signs (an aura). An aura may include:  ? Seeing flashing lights or having blind spots.  ? Seeing bright spots, halos, or zigzag lines.  ? Having tunnel vision or blurred vision.  ? Having numbness or a tingling feeling.  ? Having trouble talking.  ? Having weak muscles.  · Some people have symptoms after a migraine headache (postdromal phase), such as:  ? Tiredness.  ? Trouble thinking (concentrating).  How is this treated?  · Taking medicines that:  ? Relieve pain.  ? Relieve the feeling of being sick to your stomach.  ? Prevent migraine headaches.  · Treatment may also include:  ? Having acupuncture.  ? Avoiding foods that bring on migraine headaches.  ? Learning ways to control your body functions (biofeedback).  ? Therapy to help you know and deal with negative thoughts (cognitive behavioral therapy).  Follow these instructions at  home:  Medicines  · Take over-the-counter and prescription medicines only as told by your doctor.  · Ask your doctor if the medicine prescribed to you:  ? Requires you to avoid driving or using heavy machinery.  ? Can cause trouble pooping (constipation). You may need to take these steps to prevent or treat trouble pooping:  § Drink enough fluid to keep your pee (urine) pale yellow.  § Take over-the-counter or prescription medicines.  § Eat foods that are high in fiber. These include beans, whole grains, and fresh fruits and vegetables.  § Limit foods that are high in fat and sugar. These include fried or sweet foods.  Lifestyle  · Do not drink alcohol.  · Do not use any products that contain nicotine or tobacco, such as cigarettes, e-cigarettes, and chewing tobacco. If you need help quitting, ask your doctor.  · Get at least 8 hours of sleep every night.  · Limit and deal with stress.  General instructions         · Keep a journal to find out what may bring on your migraine headaches. For example, write down:  ? What you eat and drink.  ? How much sleep you get.  ? Any change in what you eat or drink.  ? Any change in your medicines.  · If you have a migraine headache:  ? Avoid things that make your symptoms worse, such as bright lights.  ? It may help to lie down in a dark, quiet room.  ? Do not drive or use heavy machinery.  ? Ask your doctor what activities are safe for you.  · Keep all follow-up visits as told by your doctor. This is important.  Contact a doctor if:  · You get a migraine headache that is different or worse than others you have had.  · You have more than 15 headache days in one month.  Get help right away if:  · Your migraine headache gets very bad.  · Your migraine headache lasts longer than 72 hours.  · You have a fever.  · You have a stiff neck.  · You have trouble seeing.  · Your muscles feel weak or like you cannot control them.  · You start to lose your balance a lot.  · You start to have  trouble walking.  · You pass out (faint).  · You have a seizure.  Summary  · A migraine headache is a very strong throbbing pain on one side or both sides of your head. These headaches can also cause other symptoms.  · This condition may be treated with medicines and changes to your lifestyle.  · Keep a journal to find out what may bring on your migraine headaches.  · Contact a doctor if you get a migraine headache that is different or worse than others you have had.  · Contact your doctor if you have more than 15 headache days in a month.  This information is not intended to replace advice given to you by your health care provider. Make sure you discuss any questions you have with your health care provider.  Document Released: 09/26/2009 Document Revised: 04/10/2020 Document Reviewed: 01/30/2020  Elsevier Patient Education © 2020 [employer]    Sinus Headache    A sinus headache happens when your sinuses get swollen or blocked (clogged). Sinuses are spaces behind the bones of your face and forehead. You may feel pain or pressure in your face, forehead, ears, or upper teeth. Sinus headaches can be mild or very bad.  Follow these instructions at home:  General instructions  · If told:  ? Apply a warm, moist washcloth to your face. This can help to lessen pain.  ? Use a nasal saline wash. Follow the directions on the bottle or box.  Medicines    · Take over-the-counter and prescription medicines only as told by your doctor.  · If you were prescribed an antibiotic medicine, take it as told by your doctor. Do not stop taking it even if you start to feel better.  · Use a nose spray if your nose feels full of mucus (congested).  Hydrate and humidify  · Drink enough water to keep your pee (urine) pale yellow.  · Use a cool mist humidifier to keep the humidity level in your home above 50%.  · Breathe in steam for 10-15 minutes, 3-4 times a day or as told by your doctor. You can do this in the bathroom while a hot shower  is running.  · Try not to spend time in cool or dry air.  Contact a doctor if:  · You get more than one headache a week.  · Light or sound bothers you.  · You have a fever.  · You feel sick to your stomach (nauseous) or you throw up (vomit).  · Your headaches do not get better with treatment.  Get help right away if:  · You have trouble seeing.  · You suddenly have very bad pain in your face or head.  · You start to have quick, sudden movements or shaking that you cannot control (seizure).  · You are confused.  · You have a stiff neck.  Summary  · A sinus headache happens when your sinuses get swollen or blocked (clogged). Sinuses are spaces behind the bones of your face and forehead.  · You may feel pain or pressure in your face, forehead, ears, or upper teeth.  · Take over-the-counter and prescription medicines only as told by your doctor.  · If told, apply a warm, moist washcloth to your face. This can help to lessen pain.  This information is not intended to replace advice given to you by your health care provider. Make sure you discuss any questions you have with your health care provider.  Document Released: 04/18/2012 Document Revised: 11/30/2018 Document Reviewed: 09/28/2018  "[employer]" Patient Education © 2020 "[employer] Inc[employer]    Tension Headache, Adult  A tension headache is pain, pressure, or aching in your head. Tension headaches can last from 30 minutes to several days.  Follow these instructions at home:  Managing pain  · Take over-the-counter and prescription medicines only as told by your doctor.  · When you have a headache, lie down in a dark, quiet room.  · If told, put ice on your head and neck:  ? Put ice in a plastic bag.  ? Place a towel between your skin and the bag.  ? Leave the ice on for 20 minutes, 2-3 times a day.  · If told, put heat on the back of your neck. Do this as often as your doctor tells you to. Use the kind of heat that your doctor recommends, such as a moist heat pack or a heating  pad.  ? Place a towel between your skin and the heat.  ? Leave the heat on for 20-30 minutes.  ? Remove the heat if your skin turns bright red.  Eating and drinking  · Eat meals on a regular schedule.  · Watch how much alcohol you drink:  ? If you are a woman and are not pregnant, do not drink more than 1 drink a day.  ? If you are a man, do not drink more than 2 drinks a day.  · Drink enough fluid to keep your pee (urine) pale yellow.  · Do not use a lot of caffeine, or stop using caffeine.  Lifestyle  · Get enough sleep. Get 7-9 hours of sleep each night. Or get the amount of sleep that your doctor tells you to.  · At bedtime, remove all electronic devices from your room. Examples of electronic devices are computers, phones, and tablets.  · Find ways to lessen your stress. Some things that can lessen stress are:  ? Exercise.  ? Deep breathing.  ? Yoga.  ? Music.  ? Positive thoughts.  · Sit up straight. Do not tighten (tense) your muscles.  · Do not use any products that have nicotine or tobacco in them, such as cigarettes and e-cigarettes. If you need help quitting, ask your doctor.  General instructions    · Keep all follow-up visits as told by your doctor. This is important.  · Avoid things that can bring on headaches. Keep a journal to find out if certain things bring on headaches. For example, write down:  ? What you eat and drink.  ? How much sleep you get.  ? Any change to your diet or medicines.  Contact a doctor if:  · Your headache does not get better.  · Your headache comes back.  · You have a headache and sounds, light, or smells bother you.  · You feel sick to your stomach (nauseous) or you throw up (vomit).  · Your stomach hurts.  Get help right away if:  · You suddenly get a very bad headache along with any of these:  ? A stiff neck.  ? Feeling sick to your stomach.  ? Throwing up.  ? Feeling weak.  ? Trouble seeing.  ? Feeling short of breath.  ? A rash.  ? Feeling unusually sleepy.  ? Trouble  speaking.  ? Pain in your eye or ear.  ? Trouble walking or balancing.  ? Feeling like you will pass out (faint).  ? Passing out.  Summary  · A tension headache is pain, pressure, or aching in your head.  · Tension headaches can last from 30 minutes to several days.  · Lifestyle changes and medicines may help relieve pain.  This information is not intended to replace advice given to you by your health care provider. Make sure you discuss any questions you have with your health care provider.  Document Released: 03/14/2011 Document Revised: 11/30/2018 Document Reviewed: 03/30/2018  Elsevier Patient Education © 2020 [employer]

## 2021-03-16 RX ORDER — RIZATRIPTAN BENZOATE 10 MG/1
TABLET ORAL
Qty: 9 TABLET | Refills: 3 | Status: SHIPPED | OUTPATIENT
Start: 2021-03-16 | End: 2021-06-28

## 2021-03-17 RX ORDER — RIZATRIPTAN BENZOATE 10 MG/1
10 TABLET ORAL ONCE AS NEEDED
Qty: 9 TABLET | Refills: 3 | OUTPATIENT
Start: 2021-03-17

## 2021-03-17 NOTE — TELEPHONE ENCOUNTER
Caller: Anel Perkins    Relationship: Self    Best call back number: [phone]     Medication needed:   Requested Prescriptions     Pending Prescriptions Disp Refills   • rizatriptan (MAXALT) 10 MG tablet 9 tablet 3     Sig: Take 1 tablet by mouth 1 (One) Time As Needed for Migraine. May repeat dose in 2 hours as needed       When do you need the refill by: 03/17    What additional details did the patient provide when requesting the medication: PATIENT IS OUT     Does the patient have less than a 3 day supply:  [x] Yes  [] No    What is the patient's preferred pharmacy: [facility] #[id number] [place] [address] [address] AT [facility] [address] - [phone]  - [phone] FX

## 2021-03-17 NOTE — TELEPHONE ENCOUNTER
Contacted patient to let her know her prescription was sent to the pharmacy on file yesterday. Verified the pharmacy with the patient.  Pt verbalized understanding and did not have any additional questions at this time.

## 2021-06-28 RX ORDER — RIZATRIPTAN BENZOATE 10 MG/1
TABLET ORAL
Qty: 9 TABLET | Refills: 3 | Status: SHIPPED | OUTPATIENT
Start: 2021-06-28 | End: 2021-07-12

## 2021-07-12 RX ORDER — RIZATRIPTAN BENZOATE 10 MG/1
TABLET ORAL
Qty: 9 TABLET | Refills: 3 | Status: SHIPPED | OUTPATIENT
Start: 2021-07-12 | End: 2021-12-16 | Stop reason: ALTCHOICE

## 2021-09-05 DIAGNOSIS — F41.8 DEPRESSION WITH ANXIETY: ICD-10-CM

## 2021-09-07 RX ORDER — BUPROPION HYDROCHLORIDE 300 MG/1
300 TABLET ORAL DAILY
Qty: 90 TABLET | Refills: 3 | Status: SHIPPED | OUTPATIENT
Start: 2021-09-07 | End: 2021-12-16 | Stop reason: SDUPTHER

## 2021-09-07 NOTE — TELEPHONE ENCOUNTER
Rx Refill Note  Requested Prescriptions     Pending Prescriptions Disp Refills   • buPROPion XL (WELLBUTRIN XL) 300 MG 24 hr tablet [Pharmacy Med Name: BUPROPION XL 300MG TABLETS] 90 tablet 3     Sig: TAKE 1 TABLET BY MOUTH DAILY      Last office visit with prescribing clinician: 10/27/2020      Next office visit with prescribing clinician: Visit date not found            Castillo Charles MA  09/07/21, 10:12 EDT     Tried calling patient to schedule appointment call can not be completed at this time

## 2021-09-07 NOTE — TELEPHONE ENCOUNTER
Rx Refill Note  Requested Prescriptions     Pending Prescriptions Disp Refills   • buPROPion XL (WELLBUTRIN XL) 300 MG 24 hr tablet [Pharmacy Med Name: BUPROPION XL 300MG TABLETS] 90 tablet 3     Sig: TAKE 1 TABLET BY MOUTH DAILY      Last office visit with prescribing clinician: 10/27/2020      Next office visit with prescribing clinician:         {TIP  Is Refill Pharmacy correct?:23}  Felicita Reeves MA  09/07/21, 10:43 EDT

## 2021-12-15 DIAGNOSIS — Z86.69 HISTORY OF MIGRAINE HEADACHES: ICD-10-CM

## 2021-12-15 DIAGNOSIS — F41.8 DEPRESSION WITH ANXIETY: ICD-10-CM

## 2021-12-15 NOTE — TELEPHONE ENCOUNTER
Rx Refill Note  Requested Prescriptions     Pending Prescriptions Disp Refills   • rizatriptan (MAXALT) 10 MG tablet [Pharmacy Med Name: RIZATRIPTAN 10MG TABLETS] 9 tablet 3     Sig: TAKE 1 TABLET BY MOUTH 1 TIME FOR UP TO 1 DOSE AS NEEDED FOR MIGRAINE. MAY REPEAT IN 2 HOURS AS NEEDED      Last office visit with prescribing clinician: 10/27/2020      Next office visit with prescribing clinician: Visit date not found            MERCED ALBA MA  12/15/21, 09:22 EST      Left a voicemail asking the patient to give the office a call back. Patient is due for annual physical.     Hub may relay and schedule patient.

## 2021-12-16 ENCOUNTER — OFFICE VISIT (OUTPATIENT)
Dept: FAMILY MEDICINE CLINIC | Facility: CLINIC | Age: 51
End: 2021-12-16

## 2021-12-16 VITALS
OXYGEN SATURATION: 99 % | HEIGHT: 65 IN | DIASTOLIC BLOOD PRESSURE: 74 MMHG | BODY MASS INDEX: 27.12 KG/M2 | SYSTOLIC BLOOD PRESSURE: 122 MMHG | HEART RATE: 84 BPM | WEIGHT: 162.8 LBS

## 2021-12-16 DIAGNOSIS — Z00.00 ANNUAL PHYSICAL EXAM: Primary | ICD-10-CM

## 2021-12-16 DIAGNOSIS — M32.9 SLE (SYSTEMIC LUPUS ERYTHEMATOSUS RELATED SYNDROME) (HCC): ICD-10-CM

## 2021-12-16 DIAGNOSIS — F41.8 DEPRESSION WITH ANXIETY: ICD-10-CM

## 2021-12-16 DIAGNOSIS — M79.7 FIBROMYALGIA: ICD-10-CM

## 2021-12-16 DIAGNOSIS — Z12.31 ENCOUNTER FOR SCREENING MAMMOGRAM FOR MALIGNANT NEOPLASM OF BREAST: ICD-10-CM

## 2021-12-16 DIAGNOSIS — Z86.69 HISTORY OF MIGRAINE HEADACHES: ICD-10-CM

## 2021-12-16 DIAGNOSIS — G43.829 MENSTRUAL MIGRAINE WITHOUT STATUS MIGRAINOSUS, NOT INTRACTABLE: ICD-10-CM

## 2021-12-16 PROCEDURE — 99396 PREV VISIT EST AGE 40-64: CPT | Performed by: NURSE PRACTITIONER

## 2021-12-16 RX ORDER — SUMATRIPTAN 50 MG/1
TABLET, FILM COATED ORAL
Qty: 16 TABLET | Refills: 5 | Status: SHIPPED | OUTPATIENT
Start: 2021-12-16 | End: 2022-02-16 | Stop reason: ALTCHOICE

## 2021-12-16 RX ORDER — SUMATRIPTAN 50 MG/1
TABLET, FILM COATED ORAL
Qty: 16 TABLET | Refills: 5 | Status: SHIPPED | OUTPATIENT
Start: 2021-12-16 | End: 2021-12-16 | Stop reason: SDUPTHER

## 2021-12-16 RX ORDER — BUPROPION HYDROCHLORIDE 300 MG/1
300 TABLET ORAL DAILY
Qty: 90 TABLET | Refills: 3 | Status: SHIPPED | OUTPATIENT
Start: 2021-12-16 | End: 2021-12-16 | Stop reason: SDUPTHER

## 2021-12-16 RX ORDER — RIZATRIPTAN BENZOATE 10 MG/1
TABLET ORAL
Qty: 9 TABLET | Refills: 3 | OUTPATIENT
Start: 2021-12-16

## 2021-12-16 RX ORDER — BUPROPION HYDROCHLORIDE 300 MG/1
300 TABLET ORAL DAILY
Qty: 90 TABLET | Refills: 3 | Status: SHIPPED | OUTPATIENT
Start: 2021-12-16 | End: 2022-09-22 | Stop reason: SDUPTHER

## 2021-12-16 RX ORDER — ACETAMINOPHEN 500 MG
TABLET ORAL
COMMUNITY

## 2021-12-16 NOTE — PATIENT INSTRUCTIONS
"https://www.uspreventiveservicestaskforce.org/uspstf/recommendation/lung-cancer-screening\">   Cancer Screening for Women  A cancer screening is a test or exam that checks for cancer. Your health care provider will recommend specific cancer screenings based on your age, medical history (including risk factors), and family history of cancer. Work with your health care provider to create a cancer screening schedule that protects your health.  Who should have screening?  All women should be considered for screening of certain cancers, including breast cancer, cervical cancer, colorectal cancer, and skin cancer. Your health care provider may recommend screenings for other types of cancer if:  · You had cancer before.  · You have a family member with cancer.  · You have abnormal genes that could increase the risk of cancer.  · You have risk factors for certain cancers, such as current or past use of tobacco products, or being overweight.  When you should be screened for cancer depends on:  · Your age.  · Your medical history and your family's medical history.  · Certain lifestyle factors, such as smoking or other use of tobacco products.  · Environmental exposure, such as to asbestos.  How is screening done?  Breast cancer  Breast cancer screening is done with a test that takes images of breast tissue (mammogram). Here are some screening guidelines for women at average risk:  · When you are age 40-44, you will be given the choice to start having mammograms.  · When you are age 45-54, you should have a mammogram every year.  · You may start having mammograms before age 45 if you have risk factors for breast cancer, such as having an immediate family member with breast cancer.  · At age 55 or older, you should have a mammogram every 1-2 years for as long as you are in good health and have a life expectancy of 10 years or longer.  · It is important to know what your breasts look and feel like so you can report any changes to " your health care provider.    Cervical cancer  · All women at average risk should be considered for screening for cervical cancer starting no later than age 25 and continuing until age 65. Screening should not begin earlier than age 21. You will have tests every 3-5 years, depending on your results and the type of screening test. Talk with your health care provider about which screening test is right for you and how often you should be screened.  ? Cervical cancer screening is done with an HPV (human papillomavirus) test to identify the virus that causes cervical cancer. To perform the test, a health care provider takes a swab of cells from yourcervix during a pelvic exam. This test may be performed along with a Pap test. This testchecks for abnormalities in the lowest part of the uterus (cervix).  ? If you have had the HPV vaccine, you will still be screened for cervical cancer and follow normal screening recommendations.  You do not need to be screened for cervical cancer if any of the following apply to you:  · You are older than age 65 and you have had normal screening tests in the past 10 years with no serious cervical precancer or cancer in the last 25 years.  · Your cervix and uterus have been removed, and you have never had cervical cancer or abnormal cells that could become cancer (precancerous cells).  Colorectal cancer  All adults at average risk should be considered for screening for colorectal cancer starting at age 50 and continuing until age 75. Your health care provider may recommend screening at age 45 if you are at higher risk due to family history of colon or rectal cancer or other risk factors. You will have tests every 1-10 years, depending on your results and the type of screening test. Talk with your health care provider about which screening test is right for you and how often you should be screened.  Colorectal cancer screening looks for cancer or for growths called polyps that often form  before cancer starts. Tests to look for cancer or polyps include:  · Colonoscopy or flexible sigmoidoscopy. For these procedures, a flexible tube with a small camera is inserted into the rectum.  · CT colonography. This test uses X-rays and a contrast dye to check the colon for polyps. If a polyp is found, you may need to have a colonoscopy so the polyp can be located and removed.  Tests to look for cancer in the stool (feces) include:  · Guaiac-based fecal occult blood test (FOBT). This test can find blood in stool. It can be done at home with a kit.  · Fecal immunochemical test (FIT). This test can find blood in stool. For this test, you will need to collect stool samples at home.  · Stool DNA test. This test looks for blood in stool and any changes in DNA that can lead to colon cancer. For this test, you will need to collect a stool sample at home and send it to a lab.  Endometrial cancer  There is no standard screening test for endometrial cancer, and women at average risk do not routinely need to have this screening. Talk with your health care provider about whether screening is right for you. If it is, this screening is performed through:  · Endometrial tissue biopsy. This tests a sample of tissue taken from the lining of the uterus.  · Vaginal ultrasound.  If you are at increased risk for endometrial cancer, you may need to have these tests more often than normal. You are at increased risk if:  · You have a family history of ovarian, uterine, or colon cancer.  · You are taking tamoxifen, a medicine used to treat breast cancer.  · You have certain types of colon cancer.  If you have reached menopause, it is especially important to talk with your health care provider about any vaginal bleeding or spotting. Screening for endometrial cancer is not recommended for women who do not have symptoms of the cancer, such as vaginal bleeding.  Lung cancer  Lung cancer screening is done with a CT scan that looks for  abnormal changes in the lungs. Discuss lung cancer screening with your health care provider if you are 50-80 years old and if any of the following apply to you:  · You currently smoke.  · You used to smoke heavily.  · You have a smoking history of 1 pack of cigarettes a day for 20 years or 2 packs a day for 10 years.  · You have quit smoking within the past 15 years.  You may need to be screened every year if you smoke heavily or if you used to smoke.  Skin cancer  Skin cancer screening is done by checking the skin for unusual moles or spots and any changes in existing moles. Your health care provider should check your skin for signs of skin cancer at every physical exam. You should check your skin every month and tell your health care provider right away if anything looks unusual. Women with a higher-than-normal risk for skin cancer may want to see a skin specialist (dermatologist) for an annual body check.  What are the benefits of screening?  Cancer screening is done to look for cancer in the very early stages, before it spreads and becomes harder to treat and before you would start to notice symptoms. Finding cancer early improves the chances of successful treatment. It may save your life.  Where to find more information  · American Cancer Society: www.cancer.org  · Centers for Disease Control and Prevention: www.cdc.gov  · National Cancer [place]: www.cancer.gov  · U.S. Department of Health and Human Services: www.womenshealth.gov  Contact a health care provider if:  You have concerns about any signs or symptoms of cancer. These may include:  · Skin problems. You may have:  ? Moles of an unusual shape or color.  ? Changes in existing moles.  ? A sore on your skin that does not heal.  · Tiredness (fatigue) that does not go away.  · Losing weight without trying.  · Blood in your urine or stool.  · Problems with coughing or breathing. These may include:  ? Coughing or trouble breathing that does not go  away.  ? Coughing up blood.  · Lumps or other changes in your breasts.  · Vaginal bleeding, spotting, or changes in your periods.  · Frequent pain or cramping in your abdomen.  Summary  · Your health care provider will recommend specific cancer screenings based on your age, medical history, and family history of cancer.  · Work with your health care provider to create a cancer screening schedule that protects your health.  · Finding cancer early improves the chances of successful treatment. It may save your life.  · Contact a health care provider if you have concerns about any signs or symptoms of cancer.  This information is not intended to replace advice given to you by your health care provider. Make sure you discuss any questions you have with your health care provider.  Document Revised: 05/10/2021 Document Reviewed: 11/13/2020  Elsevier Patient Education © 2021 [employer]    Chronic Migraine Headache  A migraine headache is throbbing pain that is usually on one side of the head. Migraines that keep coming back are called recurring migraines. A migraine is called a chronic migraine if it happens at least 15 days in a month for more than 3 months.  Talk with your doctor about what things may bring on (trigger) your migraines.  What are the causes?  The exact cause of this condition is not known. A migraine may be caused when nerves in the brain become irritated and release chemicals that cause irritation and swelling (inflammation) of blood vessels. The irritation and swelling of the blood vessels causes pain.  Migraines may be brought on or caused by:  · Smoking.  · Foods and drinks, such as:  ? Cheese.  ? Chocolate.  ? Alcohol.  ? Caffeine.  · Certain substances in some foods or drinks.  · Some medicines.  Other things that may bring on a migraine include:  · Periods, for women.  · Stress.  · Not enough sleep or too much sleep.  · Feeling very tired.  · Bright lights or loud noises.  · Smells  · Weather  changes and being at high altitude.  What increases the risk?  The following factors may make you more likely to have chronic migraine:  · Having migraines or family members who have them.  · Being very sad (depressed) or feeling worried or nervous (anxious).  · Taking a lot of pain medicine.  · Having problems sleeping.  · Having heart disease, diabetes, or being very overweight (obese).  What are the signs or symptoms?  Symptoms of this condition include:  · Pain that feels like it throbs.  · Pain that is usually only on one side of the head. In some cases, the pain may be on both sides of the head or around the head or neck.  · Very bad pain that keeps you from doing daily activities.  · Pain that gets worse with activity.  · Feeling like you may vomit (feeling nauseous) or vomiting.  · Pain when you are around bright lights, loud noises, or activity.  · Being sensitive to bright lights, loud noises, or smells.  · Feeling dizzy.  How is this treated?  This condition is treated with:  · Medicines. These help to:  ? Lessen pain and the feeling like you may vomit.  ? Prevent migraines.  · Changes to your diet or sleep.  · Therapy. This might include:  ? Relaxation training.  ? Biofeedback. This is a treatment that teaches you to relax, use your brain to lower your heart rate, and control your breathing.  ? Cognitive behavioral therapy (CBT). This therapy helps you set goals and follow up on the changes that you make.  · Acupuncture.  · Using a device that provides electrical stimulation to your nerves, which can help take away pain.  · Surgery, if the other treatments do not work.  Follow these instructions at home:  Medicines  · Take over-the-counter and prescription medicines only as told by your doctor.  · Ask your doctor if the medicine prescribed to you requires you to avoid driving or using machinery.  Lifestyle    · Do not use any products that contain nicotine or tobacco, such as cigarettes, e-cigarettes,  and chewing tobacco. If you need help quitting, ask your doctor.  · Do not drink alcohol.  · Get 7-9 hours of sleep each night.  · Lower the stress in your life. Ask your doctor about ways to do this.  · Stay at a healthy weight. Talk with your doctor if you need help losing weight.  · Get regular exercise.    General instructions    · Keep a journal to find out if certain things bring on migraines. For example, write down:  ? What you eat and drink.  ? How much sleep you get.  ? Any change to your diet or medicines.  · Lie down in a dark, quiet room when you have a migraine.  · Try placing a cool towel over your head when you have a migraine.  · Keep lights dim if bright lights bother you or make your migraines worse.  · Keep all follow-up visits as told by your doctor. This is important.    Where to find more information  · Coalition for Headache and Migraine Patients (CHAMP): headachemigraine.org  · American Migraine Foundation: americanmigrainefoundation.org  · National Headache Foundation: headaches.org  Contact a doctor if:  · Medicine does not help your migraine.  · Your pain keeps coming back.  Get help right away if:  · Your migraine becomes really bad and medicine does not help.  · You have a stiff neck and fever.  · You have trouble seeing.  · Your muscles are weak or you lose control of them.  · You lose your balance or have trouble walking.  · You feel like you will faint or you faint.  · You start having sudden, very bad headaches.  · You have a seizure.  Summary  · A migraine headache is very bad, throbbing pain that is usually on one side of the head.  · A chronic migraine is a migraine that happens 15 days in a month for more than 3 months.  · Talk with your doctor about what things may bring on your migraines.  · Lie down in a dark, quiet room when you have a migraine.  · Keep a journal. This can help you find out if certain things make you have migraines.  This information is not intended to  "replace advice given to you by your health care provider. Make sure you discuss any questions you have with your health care provider.  Document Revised: 02/03/2021 Document Reviewed: 02/03/2021  [employer] Patient Education © 2021 [employer]    https://www.cancer.org/cancer/colon-rectal-cancer/causes-risks-prevention/risk-factors.html\">   Colorectal Cancer Screening    Colorectal cancer screening is a group of tests that are used to check for colorectal cancer before symptoms develop. Colorectal refers to the colon and rectum. The colon and rectum are located at the end of the digestive tract and carry stool (feces) out of the body.  Who should have screening?  All adults who are 45-75 years old should have screening. Your health care provider may recommend screening before age 45. You will have tests every 1-10 years, depending on your results and the type of screening test. Screening recommendations for adults who are 76-85 years old vary depending on a person's health. People older than age 85 should no longer get colorectal cancer screening.  You may have screening tests starting before age 45, or more often than other people, if you have any of these risk factors:  · A personal or family history of colorectal cancer or abnormal growths known as polyps in your colon.  · Inflammatory bowel disease, such as ulcerative colitis or Crohn's disease.  · A history of having radiation treatment to the abdomen or the area between the hip bones (pelvic area) for cancer.  · A type of genetic syndrome that is passed from parent to child (hereditary), such as:  ? Matson syndrome.  ? Familial adenomatous polyposis.  ? Turcot syndrome.  ? Peutz-Jeghers syndrome.  ? MUTYH-associated polyposis (MAP).  · A personal history of diabetes.  Types of tests  There are several types of colorectal screening tests. You may have one or more of the following:  · Guaiac-based fecal occult blood testing. For this test, a stool sample is " checked for hidden (occult) blood, which could be a sign of colorectal cancer.  · Fecal immunochemical test (FIT). For this test, a stool sample is checked for blood, which could be a sign of colorectal cancer.  · Stool DNA test. For this test, a stool sample is checked for blood and changes in DNA that could lead to colorectal cancer.  · Sigmoidoscopy. During this test, a thin, flexible tube with a camera on the end, called a sigmoidoscope, is used to examine the rectum and the lower colon.  · Colonoscopy. During this test, a long, flexible tube with a camera on the end, called a colonoscope, is used to examine the entire colon and rectum. Also, sometimes a tissue sample is taken to be looked at under a microscope (biopsy) or small polyps are removed during this test.  · Virtual colonoscopy. Instead of a colonoscope, this type of colonoscopy uses a CT scan to take pictures of the colon and rectum. A CT scan is a type of X-ray that is made using computers.  What are the benefits of screening?  Screening reduces your risk for colorectal cancer and can help identify cancer at an early stage, when the cancer can be removed or treated more easily. It is common for polyps to form in the lining of the colon, especially as you age. These polyps may be cancerous or become cancerous over time. Screening can identify these polyps.  What are the risks of screening?  Generally, these are safe tests. However, problems may occur, including:  · The need for more tests to confirm results from a stool sample test. Stool sample tests have fewer risks than other types of screening tests.  · Being exposed to low levels of radiation, if you had a test involving X-rays. This may slightly increase your cancer risk. The benefit of detecting cancer outweighs the slight increase in risk.  · Bleeding, damage to the intestine, or infection caused by a sigmoidoscopy or colonoscopy.  · A reaction to medicines given during a sigmoidoscopy or  colonoscopy.  Talk with your health care provider to understand your risk for colorectal cancer and to make a screening plan that is right for you.  Questions to ask your health care provider  · When should I start colorectal cancer screening?  · What is my risk for colorectal cancer?  · How often do I need screening?  · Which screening tests do I need?  · How do I get my test results?  · What do my results mean?  Where to find more information  Learn more about colorectal cancer screening from:  · The American Cancer Society: cancer.org  · National Cancer [place]: cancer.gov  Summary  · Colorectal cancer screening is a group of tests used to check for colorectal cancer before symptoms develop.  · All adults who are 45-75 years old should have screening. Your health care provider may recommend screening before age 45.  · You may have screening tests starting before age 45, or more often than other people, if you have certain risk factors.  · Screening reduces your risk for colorectal cancer and can help identify cancer at an early stage, when the cancer can be removed or treated more easily.  · Talk with your health care provider to understand your risk for colorectal cancer and to make a screening plan that is right for you.  This information is not intended to replace advice given to you by your health care provider. Make sure you discuss any questions you have with your health care provider.  Document Revised: 04/07/2021 Document Reviewed: 04/07/2021  [employer] Patient Education © 2021 [employer]    Immunization Schedule, 27-49 Years Old    Vaccines are usually given at various ages, according to a schedule. Your health care provider will recommend vaccines for you based on your age, medical history, and lifestyle or other factors, such as travel or where you work.  You may receive vaccines as individual doses or as more than one vaccine together in one shot (combination vaccines). Talk with your health care  provider about the risks and benefits of combination vaccines.  Recommended immunizations for 27-49 years old  Influenza vaccine  · You should get a dose of the influenza vaccine every year.  Tetanus, diphtheria, and pertussis vaccine  A vaccine that protects against tetanus, diphtheria, and pertussis is known as the Tdap vaccine. A vaccine that protects against tetanus and diphtheria is known as the Td vaccine.  · You should only get the Td vaccine if you have had at least 1 dose of the Tdap vaccine.  · You should get 1 dose of the Td or Tdap vaccine every 10 years, or you should get 1 dose of the Tdap vaccine if:  ? You have not previously gotten a Tdap vaccine.  ? You do not know if you have ever gotten a Tdap vaccine.  ? You are between 27 and 36 weeks pregnant.  Measles, mumps, and rubella vaccine  This is also known as the MMR vaccine. You may need to get the MMR vaccine if:  · You need to catch up on doses you missed in the past.  · You have not been given the vaccine before.  · You do not have evidence of immunity (by a blood test).  Pregnant women should not get the MMR vaccine during pregnancy because it may be harmful to the unborn baby. However, if you are not immune to measles, mumps, or rubella, you should get a dose of MMR vaccine one month or more before pregnancy or within days after delivery.  Varicella vaccine  This is also known as the CYNDIE vaccine. You may need to get the CYNDIE vaccine if you were born in 1980 or later and:  · You need to catch up on doses you missed in the past.  · You have not been given the vaccine before.  · You do not have evidence of immunity (by a blood test).  · You have certain high-risk conditions, such as HIV or AIDS.  Pregnant women should not get the CYNDIE vaccine during pregnancy because it may be harmful to the unborn baby. However, if you are not immune to chickenpox (varicella), you should get a dose of the CYNDIE vaccine within days after delivery.  Human  papillomavirus vaccine  This is also known as the HPV vaccine. If you have not gotten the vaccine before or you missed doses in the past, talk to your health care provider about whether it is appropriate for you to get the HPV vaccine.  Pneumococcal conjugate vaccine  This is also known as the PCV13 vaccine. You should get the PCV13 vaccine as recommended if you have certain high-risk conditions. These include:  · Diabetes.  · Chronic conditions of the heart, lungs, or liver.  · Conditions that affect the body's disease-fighting system (immune system).  Pneumococcal polysaccharide vaccine  This is also known as the PPSV23 vaccine. You should get the PPSV23 vaccine as recommended if you have certain high-risk conditions. These include:  · Diabetes.  · Chronic conditions of the heart, lungs, or liver.  · Conditions that affect the immune system.  Hepatitis A vaccine  This is also known as the HepA vaccine. If you did not get the HepA vaccine previously, you should get it if:  · You are at risk for a hepatitis A infection. You may be at risk for infection if you:  ? Have chronic liver disease.  ? Have HIV or AIDS.  ? Are a man who has sex with men.  ? Use drugs.  ? Are homeless.  ? May be exposed to hepatitis A through work.  ? Travel to countries where hepatitis A is common.  ? Are pregnant.  ? Have or will have close contact with someone who was adopted from another country.  · You are not at risk for infection but want protection from hepatitis A.  Hepatitis B vaccine  This is also known as the HepB vaccine. If you did not get the HepB vaccine previously, you should get it if:  · You are at risk for hepatitis B infection. You are at risk if you:  ? Have chronic liver disease.  ? Have HIV or AIDS.  ? Have sex with a partner who has hepatitis B, or:  § You have multiple sex partners.  § You are a man who has sex with men.  ? Use drugs.  ? May be exposed to hepatitis B through work.  ? Live with someone who has  hepatitis B.  ? Receive dialysis treatment.  ? Have diabetes.  ? Travel to countries where hepatitis B is common.  ? Are pregnant.  · You are not at risk of infection but want protection from hepatitis B.  Meningococcal conjugate vaccine  This is also known as the MenACWY vaccine. You may need to get the MenACWY vaccine if you:  · Have not been given the vaccine before.  · Need to catch up on doses you missed in the past.  This vaccine is especially important if you:  · Do not have a spleen.  · Have sickle cell disease.  · Have HIV.  · Take medicines that suppress your immune system.  · Travel to countries where meningococcal disease is common.  · Are exposed to Neisseria meningitidis at work.  Serogroup B meningococcal vaccine  This is also known as the MenB vaccine. You may need to get the MenB vaccine if you:  · Have not been given the vaccine before.  · Need to catch up on doses you missed in the past.  This vaccine is especially important if you:  · Do not have a spleen.  · Have sickle cell disease.  · Take medicines that suppress your immune system.  · Are exposed to Neisseria meningitidis at work.  Haemophilus influenzae type b vaccine  This is also known as the Hib vaccine. Anyone older than 5 years of age is usually not given the Hib vaccine. However, if you have certain high-risk conditions, you may need to get this vaccine. These conditions include:  · Not having a spleen.  · Having received a stem cell transplant.  Before you get a vaccine:  Talk with your health care provider about which vaccines are right for you. This is especially important if:  · You previously had a reaction after getting a vaccine.  · You have a weakened immune system. You may have a weakened immune system if you:  ? Are taking medicines that reduce (suppress) the activity of your immune system.  ? Are taking medicines to treat cancer (chemotherapy).  ? Have HIV or AIDS.  · You work in an environment where you may be exposed to a  disease.  · You plan to travel outside of the country.  · You have a chronic illness, such as heart disease, kidney disease, diabetes, or lung disease.  · You are pregnant, think you may be pregnant, or are planning to become pregnant.  Summary  · Before you get a vaccine, tell your health care provider if you have reacted to vaccines in the past or have a condition that weakens your immune system.  · At 27-49 years, you should get a dose of the influenza vaccine every year and a dose of the Td or Tdap vaccine every 10 years.  · Depending on your medical history and your risk factors, you may need other vaccines. Ask your health care provider whether you are up to date on all your vaccines.  · Women who are pregnant may not receive certain vaccines. Ask your health care provider whether you should receive any vaccines soon after you deliver your baby.  This information is not intended to replace advice given to you by your health care provider. Make sure you discuss any questions you have with your health care provider.  Document Revised: 10/14/2020 Document Reviewed: 10/14/2020  [employer] Patient Education © 2021 [employer]    Health Maintenance, Female  Adopting a healthy lifestyle and getting preventive care are important in promoting health and wellness. Ask your health care provider about:  · The right schedule for you to have regular tests and exams.  · Things you can do on your own to prevent diseases and keep yourself healthy.  What should I know about diet, weight, and exercise?  Eat a healthy diet    · Eat a diet that includes plenty of vegetables, fruits, low-fat dairy products, and lean protein.  · Do not eat a lot of foods that are high in solid fats, added sugars, or sodium.    Maintain a healthy weight  Body mass index (BMI) is used to identify weight problems. It estimates body fat based on height and weight. Your health care provider can help determine your BMI and help you achieve or maintain a  healthy weight.  Get regular exercise  Get regular exercise. This is one of the most important things you can do for your health. Most adults should:  · Exercise for at least 150 minutes each week. The exercise should increase your heart rate and make you sweat (moderate-intensity exercise).  · Do strengthening exercises at least twice a week. This is in addition to the moderate-intensity exercise.  · Spend less time sitting. Even light physical activity can be beneficial.  Watch cholesterol and blood lipids  Have your blood tested for lipids and cholesterol at 20 years of age, then have this test every 5 years.  Have your cholesterol levels checked more often if:  · Your lipid or cholesterol levels are high.  · You are older than 40 years of age.  · You are at high risk for heart disease.  What should I know about cancer screening?  Depending on your health history and family history, you may need to have cancer screening at various ages. This may include screening for:  · Breast cancer.  · Cervical cancer.  · Colorectal cancer.  · Skin cancer.  · Lung cancer.  What should I know about heart disease, diabetes, and high blood pressure?  Blood pressure and heart disease  · High blood pressure causes heart disease and increases the risk of stroke. This is more likely to develop in people who have high blood pressure readings, are of  descent, or are overweight.  · Have your blood pressure checked:  ? Every 3-5 years if you are 18-39 years of age.  ? Every year if you are 40 years old or older.  Diabetes  Have regular diabetes screenings. This checks your fasting blood sugar level. Have the screening done:  · Once every three years after age 40 if you are at a normal weight and have a low risk for diabetes.  · More often and at a younger age if you are overweight or have a high risk for diabetes.  What should I know about preventing infection?  Hepatitis B  If you have a higher risk for hepatitis B, you should  be screened for this virus. Talk with your health care provider to find out if you are at risk for hepatitis B infection.  Hepatitis C  Testing is recommended for:  · Everyone born from 1945 through 1965.  · Anyone with known risk factors for hepatitis C.  Sexually transmitted infections (STIs)  · Get screened for STIs, including gonorrhea and chlamydia, if:  ? You are sexually active and are younger than 24 years of age.  ? You are older than 24 years of age and your health care provider tells you that you are at risk for this type of infection.  ? Your sexual activity has changed since you were last screened, and you are at increased risk for chlamydia or gonorrhea. Ask your health care provider if you are at risk.  · Ask your health care provider about whether you are at high risk for HIV. Your health care provider may recommend a prescription medicine to help prevent HIV infection. If you choose to take medicine to prevent HIV, you should first get tested for HIV. You should then be tested every 3 months for as long as you are taking the medicine.  Pregnancy  · If you are about to stop having your period (premenopausal) and you may become pregnant, seek counseling before you get pregnant.  · Take 400 to 800 micrograms (mcg) of folic acid every day if you become pregnant.  · Ask for birth control (contraception) if you want to prevent pregnancy.  Osteoporosis and menopause  Osteoporosis is a disease in which the bones lose minerals and strength with aging. This can result in bone fractures. If you are 65 years old or older, or if you are at risk for osteoporosis and fractures, ask your health care provider if you should:  · Be screened for bone loss.  · Take a calcium or vitamin D supplement to lower your risk of fractures.  · Be given hormone replacement therapy (HRT) to treat symptoms of menopause.  Follow these instructions at home:  Lifestyle  · Do not use any products that contain nicotine or tobacco, such  as cigarettes, e-cigarettes, and chewing tobacco. If you need help quitting, ask your health care provider.  · Do not use street drugs.  · Do not share needles.  · Ask your health care provider for help if you need support or information about quitting drugs.  Alcohol use  · Do not drink alcohol if:  ? Your health care provider tells you not to drink.  ? You are pregnant, may be pregnant, or are planning to become pregnant.  · If you drink alcohol:  ? Limit how much you use to 0-1 drink a day.  ? Limit intake if you are breastfeeding.  · Be aware of how much alcohol is in your drink. In the [place] one drink equals one 12 oz bottle of beer (355 mL), one 5 oz glass of wine (148 mL), or one 1½ oz glass of hard liquor (44 mL).  General instructions  · Schedule regular health, dental, and eye exams.  · Stay current with your vaccines.  · Tell your health care provider if:  ? You often feel depressed.  ? You have ever been abused or do not feel safe at home.  Summary  · Adopting a healthy lifestyle and getting preventive care are important in promoting health and wellness.  · Follow your health care provider's instructions about healthy diet, exercising, and getting tested or screened for diseases.  · Follow your health care provider's instructions on monitoring your cholesterol and blood pressure.  This information is not intended to replace advice given to you by your health care provider. Make sure you discuss any questions you have with your health care provider.  Document Revised: 12/11/2019 Document Reviewed: 12/11/2019  [employer] Patient Education © 2021 [employer]    Preventive Care 40-64 Years Old, Female  Preventive care refers to visits with your health care provider and lifestyle choices that can promote health and wellness. This includes:  · A yearly physical exam. This may also be called an annual well check.  · Regular dental visits and eye exams.  · Immunizations.  · Screening for certain  conditions.  · Healthy lifestyle choices, such as eating a healthy diet, getting regular exercise, not using drugs or products that contain nicotine and tobacco, and limiting alcohol use.  What can I expect for my preventive care visit?  Physical exam  Your health care provider will check your:  · Height and weight. This may be used to calculate body mass index (BMI), which tells if you are at a healthy weight.  · Heart rate and blood pressure.  · Skin for abnormal spots.  Counseling  Your health care provider may ask you questions about your:  · Alcohol, tobacco, and drug use.  · Emotional well-being.  · Home and relationship well-being.  · Sexual activity.  · Eating habits.  · Work and work environment.  · Method of birth control.  · Menstrual cycle.  · Pregnancy history.  What immunizations do I need?    Influenza (flu) vaccine  · This is recommended every year.  Tetanus, diphtheria, and pertussis (Tdap) vaccine  · You may need a Td booster every 10 years.  Varicella (chickenpox) vaccine  · You may need this if you have not been vaccinated.  Zoster (shingles) vaccine  · You may need this after age 60.  Measles, mumps, and rubella (MMR) vaccine  · You may need at least one dose of MMR if you were born in 1957 or later. You may also need a second dose.  Pneumococcal conjugate (PCV13) vaccine  · You may need this if you have certain conditions and were not previously vaccinated.  Pneumococcal polysaccharide (PPSV23) vaccine  · You may need one or two doses if you smoke cigarettes or if you have certain conditions.  Meningococcal conjugate (MenACWY) vaccine  · You may need this if you have certain conditions.  Hepatitis A vaccine  · You may need this if you have certain conditions or if you travel or work in places where you may be exposed to hepatitis A.  Hepatitis B vaccine  · You may need this if you have certain conditions or if you travel or work in places where you may be exposed to hepatitis B.  Haemophilus  influenzae type b (Hib) vaccine  · You may need this if you have certain conditions.  Human papillomavirus (HPV) vaccine  · If recommended by your health care provider, you may need three doses over 6 months.  You may receive vaccines as individual doses or as more than one vaccine together in one shot (combination vaccines). Talk with your health care provider about the risks and benefits of combination vaccines.  What tests do I need?  Blood tests  · Lipid and cholesterol levels. These may be checked every 5 years, or more frequently if you are over 50 years old.  · Hepatitis C test.  · Hepatitis B test.  Screening  · Lung cancer screening. You may have this screening every year starting at age 55 if you have a 30-pack-year history of smoking and currently smoke or have quit within the past 15 years.  · Colorectal cancer screening. All adults should have this screening starting at age 50 and continuing until age 75. Your health care provider may recommend screening at age 45 if you are at increased risk. You will have tests every 1-10 years, depending on your results and the type of screening test.  · Diabetes screening. This is done by checking your blood sugar (glucose) after you have not eaten for a while (fasting). You may have this done every 1-3 years.  · Mammogram. This may be done every 1-2 years. Talk with your health care provider about when you should start having regular mammograms. This may depend on whether you have a family history of breast cancer.  · BRCA-related cancer screening. This may be done if you have a family history of breast, ovarian, tubal, or peritoneal cancers.  · Pelvic exam and Pap test. This may be done every 3 years starting at age 21. Starting at age 30, this may be done every 5 years if you have a Pap test in combination with an HPV test.  Other tests  · Sexually transmitted disease (STD) testing.  · Bone density scan. This is done to screen for osteoporosis. You may have this  scan if you are at high risk for osteoporosis.  Follow these instructions at home:  Eating and drinking  · Eat a diet that includes fresh fruits and vegetables, whole grains, lean protein, and low-fat dairy.  · Take vitamin and mineral supplements as recommended by your health care provider.  · Do not drink alcohol if:  ? Your health care provider tells you not to drink.  ? You are pregnant, may be pregnant, or are planning to become pregnant.  · If you drink alcohol:  ? Limit how much you have to 0-1 drink a day.  ? Be aware of how much alcohol is in your drink. In the [place] one drink equals one 12 oz bottle of beer (355 mL), one 5 oz glass of wine (148 mL), or one 1½ oz glass of hard liquor (44 mL).  Lifestyle  · Take daily care of your teeth and gums.  · Stay active. Exercise for at least 30 minutes on 5 or more days each week.  · Do not use any products that contain nicotine or tobacco, such as cigarettes, e-cigarettes, and chewing tobacco. If you need help quitting, ask your health care provider.  · If you are sexually active, practice safe sex. Use a condom or other form of birth control (contraception) in order to prevent pregnancy and STIs (sexually transmitted infections).  · If told by your health care provider, take low-dose aspirin daily starting at age 50.  What's next?  · Visit your health care provider once a year for a well check visit.  · Ask your health care provider how often you should have your eyes and teeth checked.  · Stay up to date on all vaccines.  This information is not intended to replace advice given to you by your health care provider. Make sure you discuss any questions you have with your health care provider.  Document Revised: 08/29/2019 Document Reviewed: 08/29/2019  [employer] Patient Education © 2020 [employer]

## 2021-12-16 NOTE — PROGRESS NOTES
Patient Care Team:  Marlyn Barbosa APRN as PCP - General (Nurse Practitioner)  Chief Complaint   Patient presents with   • Annual Exam        Chief complaint: Patient is in today for a physical     Patient is a 51 y.o. female who presents for her yearly physical exam.     HPI   Has GYN, UTD on PAP, Katelynn Joel; tries to eat a healthy diet, followed by Francy Burleson, for Lupus and arthritis; tries to eat healthy diet; no regular exercise due to joint pain  Is taking Mediquil for hot flashes, had normal periods in March and November  Review of Systems   Constitutional: Negative for activity change, appetite change, chills, diaphoresis, fatigue, fever and unexpected weight change.   HENT: Negative for congestion, ear pain, hearing loss and trouble swallowing.    Eyes: Negative for photophobia, redness and visual disturbance.   Respiratory: Negative for cough, shortness of breath and wheezing.    Cardiovascular: Negative for chest pain, palpitations and leg swelling.   Gastrointestinal: Negative for abdominal distention, abdominal pain, anal bleeding, blood in stool, constipation, diarrhea, nausea and vomiting.   Endocrine: Negative for cold intolerance, heat intolerance, polydipsia and polyuria.   Genitourinary: Negative for difficulty urinating, dysuria and menstrual problem (irregular).   Musculoskeletal: Positive for arthralgias, back pain, joint swelling (fingers), myalgias and neck pain.   Skin: Negative for color change, pallor, rash and wound.   Neurological: Positive for headaches (migraines 5 xs a month, meds manage). Negative for dizziness, tremors, seizures, syncope, light-headedness and numbness.   Hematological: Does not bruise/bleed easily.   Psychiatric/Behavioral: Positive for dysphoric mood (wellbutrin controls) and sleep disturbance (r/t pain). Negative for self-injury and suicidal ideas. The patient is nervous/anxious.       History  Past Medical History:   Diagnosis Date   • Anxiety    •  Arthritis     DDD   • Depression    • Fibromyalgia    • IBS (irritable bowel syndrome)    • Lupus (HCC)    • Migraine headache    • Tendinitis       Past Surgical History:   Procedure Laterality Date   • CHOLECYSTECTOMY     • DIAGNOSTIC LAPAROSCOPY     • ENDOMETRIAL ABLATION     • LASIK     • MANDIBLE SURGERY      and Reset      Allergies   Allergen Reactions   • No Known Drug Allergy       Family History   Problem Relation Age of Onset   • Multiple sclerosis Mother    • Heart attack Father    • Arthritis Sister    • Depression Daughter    • Obesity Daughter    • No Known Problems Son      Social History     Socioeconomic History   • Marital status:    Tobacco Use   • Smoking status: Never Smoker   • Smokeless tobacco: Never Used   Substance and Sexual Activity   • Alcohol use: No   • Drug use: No   • Sexual activity: Yes     Partners: Male        Current Outpatient Medications:   •  acetaminophen (TYLENOL) 500 MG tablet, Tylenol Extra Strength 500 mg tablet  As needed, Disp: , Rfl:   •  baclofen (LIORESAL) 20 MG tablet, Take 20 mg by mouth 4 (Four) Times a Day., Disp: , Rfl:   •  Cholecalciferol (VITAMIN D) 2000 units capsule, Take  by mouth., Disp: , Rfl:   •  cycloSPORINE (RESTASIS) 0.05 % ophthalmic emulsion, Apply  to eye., Disp: , Rfl:   •  fluticasone (Flonase) 50 MCG/ACT nasal spray, 2 sprays into the nostril(s) as directed by provider Daily., Disp: 1 bottle, Rfl: 11  •  gabapentin (NEURONTIN) 100 MG capsule, Take 300 mg by mouth Every Night., Disp: , Rfl:   •  Ibuprofen 200 MG capsule, Take  by mouth., Disp: , Rfl:   •  lansoprazole (PREVACID) 30 MG capsule, , Disp: , Rfl: 0  •  nadolol (Corgard) 20 MG tablet, Take 1 tablet by mouth Daily., Disp: 30 tablet, Rfl: 5  •  traMADol (ULTRAM) 50 MG tablet, Take  by mouth 3 (Three) Times a Day., Disp: , Rfl:   •  buPROPion XL (WELLBUTRIN XL) 300 MG 24 hr tablet, Take 1 tablet by mouth Daily., Disp: 90 tablet, Rfl: 3  •  SUMAtriptan (IMITREX) 50 MG tablet,  Take 1 tablet, may repeat dose in 2 hrs if needed, Disp: 16 tablet, Rfl: 5    Immunizations   N/A   Prescribed/Refused   Date     Td/Tdap  (Booster Q 10 yrs)   []           Prescribed    []     Refused        []           Flu  (Yearly)   []        Prescribed    []     Refused        []           Pneumovax  (1 yr after Prevnar)   []        Prescribed    []     Refused        []           Prevnar 13  (1 yr after Pneumo)   []        Prescribed    []     Refused        []           Hep B     []        Prescribed    []     Refused        []           Shingles  (Age 50 and older)   []        Prescribed    []     Refused        []           Immunization History   Administered Date(s) Administered   • COVID-19 (MODERNA) 1st, 2nd, 3rd Dose Only 12/30/2020, 01/27/2021, 11/17/2021     Health Maintenance   Topic Date Due   • COLORECTAL CANCER SCREENING  Never done   • TDAP/TD VACCINES (1 - Tdap) Never done   • PAP SMEAR  08/25/2020   • INFLUENZA VACCINE  Never done   • ANNUAL PHYSICAL  09/15/2021   • MAMMOGRAM  Never done   • HEPATITIS C SCREENING  Completed   • COVID-19 Vaccine  Completed   • Pneumococcal Vaccine 0-64  Aged Out        Diabetes  [] Yes  [] No   N/A      Date     Eye Exam     []             []   Complete     []   Recommended Date:  Where:       Foot Exam     []         []   Complete          Obesity Counseling     []       []   Complete     No results found for: HGBA1C, MICROALBUR    Additional Testing      Date     Colorectal Screening       []   N/A   []   Complete    []   Ordered     Date:    Where:       Pap      []   N/A   []   Complete   []   OB/GYN Date:    Where:       Mammogram        []   N/A   []   Complete   []  OB/GYN   []   Ordered Date:    Where:     PSA  (Over age 50)    []   N/A   []   Complete   []   Ordered Date:    Where:     US Aorta  (For male smokers, age 65)     []   N/A   []   Complete   []   Ordered Date:    Where:     CT for Smoker  (Age 55-75, 30 pk yr)    []   N/A   []   Complete    "[]   Ordered Date:    Where:     Bone Density/DEXA      []   N/A   []   Complete   []   Ordered Date:    Follow-up:     Hep. C        []   N/A   []   Complete   []   Ordered Date:    Where:     Results for orders placed or performed in visit on 09/14/20   Lipid Panel    Specimen: Blood   Result Value Ref Range    Total Cholesterol 190 0 - 200 mg/dL    Triglycerides 84 0 - 150 mg/dL    HDL Cholesterol 69 (H) 40 - 60 mg/dL    LDL Cholesterol  104 (H) 0 - 100 mg/dL    VLDL Cholesterol 16.8 5 - 40 mg/dL    LDL/HDL Ratio 1.51    TSH Rfx On Abnormal To Free T4    Specimen: Blood   Result Value Ref Range    TSH 2.290 0.270 - 4.200 uIU/mL   Hepatitis C Antibody    Specimen: Blood   Result Value Ref Range    Hepatitis C Ab Non-Reactive Non-Reactive            /74   Pulse 84   Ht 165.5 cm (65.16\")   Wt 73.8 kg (162 lb 12.8 oz)   SpO2 99%   BMI 26.96 kg/m²       Physical Exam  Vitals and nursing note reviewed.   Constitutional:       General: She is not in acute distress.     Appearance: Normal appearance. She is well-developed. She is not diaphoretic.   HENT:      Head: Normocephalic and atraumatic.      Right Ear: External ear normal.      Left Ear: External ear normal.      Nose: Nose normal.   Eyes:      General: No scleral icterus.        Right eye: No discharge.         Left eye: No discharge.      Conjunctiva/sclera: Conjunctivae normal.      Pupils: Pupils are equal, round, and reactive to light.   Neck:      Thyroid: No thyromegaly.      Vascular: No carotid bruit or JVD.      Trachea: No tracheal deviation.   Cardiovascular:      Rate and Rhythm: Normal rate and regular rhythm.      Heart sounds: Normal heart sounds. No murmur heard.  No friction rub. No gallop.    Pulmonary:      Effort: Pulmonary effort is normal. No respiratory distress.      Breath sounds: Normal breath sounds. No stridor. No wheezing or rales.   Chest:      Chest wall: No tenderness.   Abdominal:      General: Bowel sounds are normal. " There is no distension.      Palpations: Abdomen is soft. There is no mass.      Tenderness: There is no abdominal tenderness. There is no guarding or rebound.      Hernia: No hernia is present.   Musculoskeletal:         General: No tenderness or deformity.   Lymphadenopathy:      Cervical: No cervical adenopathy.   Skin:     General: Skin is warm and dry.      Coloration: Skin is not pale.      Findings: No erythema or rash.   Neurological:      Mental Status: She is alert and oriented to person, place, and time.      Cranial Nerves: No cranial nerve deficit.      Motor: No abnormal muscle tone.      Coordination: Coordination normal.      Deep Tendon Reflexes: Reflexes are normal and symmetric. Reflexes normal.   Psychiatric:         Behavior: Behavior normal.         Thought Content: Thought content normal.         Judgment: Judgment normal.             Counseling provided on diet and nutrition and exercise.    Diagnoses and all orders for this visit:    Annual physical exam  -     Comprehensive Metabolic Panel; Future  -     Lipid Panel; Future  -     TSH Rfx On Abnormal To Free T4; Future    Encounter for screening mammogram for malignant neoplasm of breast  -     Mammo Screening Digital Tomosynthesis Bilateral With CAD; Future    Depression with anxiety  -     Discontinue: buPROPion XL (WELLBUTRIN XL) 300 MG 24 hr tablet; Take 1 tablet by mouth Daily.  -     Discontinue: buPROPion XL (WELLBUTRIN XL) 300 MG 24 hr tablet; Take 1 tablet by mouth Daily.    Menstrual migraine without status migrainosus, not intractable    History of migraine headaches  -     Discontinue: SUMAtriptan (IMITREX) 50 MG tablet; Take 1 tablet, may repeat dose in 2 hrs if needed    SLE (systemic lupus erythematosus related syndrome) (HCC)    Fibromyalgia    Other orders  -     acetaminophen (TYLENOL) 500 MG tablet; Tylenol Extra Strength 500 mg tablet   As needed     Screening labs ordered to evaluate chronic conditions. I will contact  patient regarding test results and provide instructions regarding any necessary changes in plan of care.  Depression and anxiety are controlled, continue current mediations  Migraines are controlled with imitrex  rheumatologist manages Lupus and fibromyalgia  Nutrition and activity goals reviewed including: mainly water to drink, limit white flour/processed sugar, and processed foods, choose fresh fruits, vegetables, fish, lean meats,high fiber carbs, exercise  working toward 150 mins cardio per week, weight training 2x/week.  Patient was encouraged to keep me informed of any acute changes, lack of improvement, or any new concerning symptoms.  Patient declines flu vaccine today; declines order for colonoscopy    VINH Shepard   12/22/2021   20:56 EST          Please note that portions of this document were completed with a voice recognition program.

## 2021-12-16 NOTE — TELEPHONE ENCOUNTER
SPOKE WITH PATIENT SHE DID REQUEST REFILL YESTERDAY BUT IT WAS CHANGED TO SUMATRIPTAN TODAY 12/16/2021    WHILE SPEAKING WITH THE PATIENT SHE MENTIONED PRESCRIPTIONS WERE SENT TO [facility] AND IT SHOULD [facility] BEEN [facility]

## 2022-02-16 ENCOUNTER — TELEPHONE (OUTPATIENT)
Dept: FAMILY MEDICINE CLINIC | Facility: CLINIC | Age: 52
End: 2022-02-16

## 2022-02-16 RX ORDER — RIZATRIPTAN BENZOATE 10 MG/1
10 TABLET ORAL ONCE AS NEEDED
Qty: 16 TABLET | Refills: 1 | Status: SHIPPED | OUTPATIENT
Start: 2022-02-16 | End: 2022-08-04

## 2022-02-16 NOTE — TELEPHONE ENCOUNTER
Caller: Anel Perkins PRASANNA    Relationship: Self    Best call back number: [phone]    What medications are you currently taking:   Current Outpatient Medications on File Prior to Visit   Medication Sig Dispense Refill   • acetaminophen (TYLENOL) 500 MG tablet Tylenol Extra Strength 500 mg tablet   As needed     • baclofen (LIORESAL) 20 MG tablet Take 20 mg by mouth 4 (Four) Times a Day.     • buPROPion XL (WELLBUTRIN XL) 300 MG 24 hr tablet Take 1 tablet by mouth Daily. 90 tablet 3   • Cholecalciferol (VITAMIN D) 2000 units capsule Take  by mouth.     • cycloSPORINE (RESTASIS) 0.05 % ophthalmic emulsion Apply  to eye.     • fluticasone (Flonase) 50 MCG/ACT nasal spray 2 sprays into the nostril(s) as directed by provider Daily. 1 bottle 11   • gabapentin (NEURONTIN) 100 MG capsule Take 300 mg by mouth Every Night.     • Ibuprofen 200 MG capsule Take  by mouth.     • lansoprazole (PREVACID) 30 MG capsule   0   • nadolol (Corgard) 20 MG tablet Take 1 tablet by mouth Daily. 30 tablet 5   • SUMAtriptan (IMITREX) 50 MG tablet Take 1 tablet, may repeat dose in 2 hrs if needed 16 tablet 5   • traMADol (ULTRAM) 50 MG tablet Take  by mouth 3 (Three) Times a Day.       No current facility-administered medications on file prior to visit.          When did you start taking these medications: N/A    Which medication are you concerned about: SUMAtriptan (IMITREX) 50 MG tablet    Who prescribed you this medication: GEOFFREY    What are your concerns:PATIENT WOULD LIKE TO GO BACK TO ORIGINAL MEDICATION FOR MIGRAINES THIS MEDICATION IS NOT WORKING AND WOULD NEED TO INCREASE THE ORIGINAL AS WELL    PLEASE ADVISE    How long have you had these concerns: N/A

## 2022-02-17 NOTE — TELEPHONE ENCOUNTER
Called and spoke to patient. Informed of providers orders. Voiced understanding. Asked if we could send labs to [facility]. Stated she has appt there at the [place] location.Called [facility] for patient to obtain fax number to send lab orders over. Was told that due to it being a small clinic, their lab only completes labs with providers in their office. Called and informed patient. Verbalized understanding. Informed of other options to complete labs and that all [facility] outpatient diagnostics can pull up orders and she can have done at those locations as well. Voiced understanding. Had no further questions at this time.

## 2022-07-13 LAB
AMBIG ABBREV LP DEFAULT: NORMAL
CHOLEST SERPL-MCNC: 204 MG/DL (ref 100–199)
HDLC SERPL-MCNC: 66 MG/DL
LDLC SERPL CALC-MCNC: 111 MG/DL (ref 0–99)
TRIGL SERPL-MCNC: 157 MG/DL (ref 0–149)
TSH SERPL DL<=0.005 MIU/L-ACNC: 2.66 UIU/ML (ref 0.45–4.5)
VLDLC SERPL CALC-MCNC: 27 MG/DL (ref 5–40)

## 2022-08-04 RX ORDER — RIZATRIPTAN BENZOATE 10 MG/1
TABLET ORAL
Qty: 9 TABLET | Refills: 0 | Status: SHIPPED | OUTPATIENT
Start: 2022-08-04 | End: 2022-10-24 | Stop reason: SDUPTHER

## 2022-08-04 NOTE — TELEPHONE ENCOUNTER
Rx Refill Note  Requested Prescriptions     Pending Prescriptions Disp Refills   • rizatriptan (MAXALT) 10 MG tablet [Pharmacy Med Name: RIZATRIPTAN 10 MG TABLET] 9 tablet 0     Sig: TAKE ONE TABLET BY MOUTH ONCE AS NEEDED FOR MIGRAINE FOR UP TO 1 DOSE,MAY REPEAT IN 2 HOURS IF NEEDED FOR 2 DOSES,MAX DOSE IS 30 MG IN 24 HOURS      Last office visit with prescribing clinician: 12/16/2021      Next office visit with prescribing clinician: Visit date not found            Caroline Kay MA  08/04/22, 12:09 EDT

## 2022-09-22 DIAGNOSIS — F41.8 DEPRESSION WITH ANXIETY: ICD-10-CM

## 2022-09-22 RX ORDER — BUPROPION HYDROCHLORIDE 300 MG/1
300 TABLET ORAL DAILY
Qty: 30 TABLET | Refills: 0 | Status: SHIPPED | OUTPATIENT
Start: 2022-09-22 | End: 2022-10-24

## 2022-09-22 NOTE — TELEPHONE ENCOUNTER
Caller: Anel Perkins PRASANNA    Relationship: Self    Best call back number: [phone]    Requested Prescriptions:   Requested Prescriptions     Pending Prescriptions Disp Refills   • buPROPion XL (WELLBUTRIN XL) 300 MG 24 hr tablet 90 tablet 3     Sig: Take 1 tablet by mouth Daily.      Pharmacy where request should be sent: [id number] - [place], [place] - [address]  - [phone]  - [phone] FX     Additional details provided by patient:     PATIENT IS COMPLETELY OUT AND PATIENT LEAVES TOWN TODAY.    Does the patient have less than a 3 day supply:  [x] Yes  [] No    Jimmy Osullivan Rep   09/22/22 08:45 EDT

## 2022-09-22 NOTE — TELEPHONE ENCOUNTER
Old script was sent to [facility]. Sent short supply to [facility] for remaining script

## 2022-09-27 DIAGNOSIS — F41.8 DEPRESSION WITH ANXIETY: ICD-10-CM

## 2022-10-24 ENCOUNTER — OFFICE VISIT (OUTPATIENT)
Dept: FAMILY MEDICINE CLINIC | Facility: CLINIC | Age: 52
End: 2022-10-24

## 2022-10-24 VITALS
DIASTOLIC BLOOD PRESSURE: 72 MMHG | SYSTOLIC BLOOD PRESSURE: 110 MMHG | BODY MASS INDEX: 27.49 KG/M2 | RESPIRATION RATE: 16 BRPM | OXYGEN SATURATION: 100 % | HEIGHT: 65 IN | HEART RATE: 93 BPM | TEMPERATURE: 98 F | WEIGHT: 165 LBS

## 2022-10-24 DIAGNOSIS — G43.829 MENSTRUAL MIGRAINE WITHOUT STATUS MIGRAINOSUS, NOT INTRACTABLE: Primary | ICD-10-CM

## 2022-10-24 DIAGNOSIS — F41.8 DEPRESSION WITH ANXIETY: ICD-10-CM

## 2022-10-24 PROCEDURE — 99213 OFFICE O/P EST LOW 20 MIN: CPT | Performed by: NURSE PRACTITIONER

## 2022-10-24 RX ORDER — RIZATRIPTAN BENZOATE 10 MG/1
10 TABLET ORAL ONCE AS NEEDED
Qty: 9 TABLET | Refills: 5 | Status: SHIPPED | OUTPATIENT
Start: 2022-10-24

## 2022-10-24 RX ORDER — BUPROPION HYDROCHLORIDE 300 MG/1
300 TABLET ORAL DAILY
Qty: 90 TABLET | Refills: 1 | Status: SHIPPED | OUTPATIENT
Start: 2022-10-24 | End: 2023-04-03

## 2022-10-24 RX ORDER — SUMATRIPTAN 50 MG/1
TABLET, FILM COATED ORAL
COMMUNITY
Start: 2022-10-06

## 2022-10-24 NOTE — PROGRESS NOTES
Subjective   Anel Perkins is a 51 y.o. female.   Chief Complaint   Patient presents with   • Med Refill     Anxiety   Depression        History of Present Illness   patient is here for follow up, has migraines about 5 a month, controlled with rizatriptan, interested in trying something new  States she does have a GYN, does not recall when last PAP was done, declines mammogram and colonoscopy referrals.   The following portions of the patient's history were reviewed and updated as appropriate: allergies, current medications, past family history, past medical history, past social history, past surgical history and problem list.    Review of Systems   Constitutional: Positive for fatigue. Negative for chills and fever.   Respiratory: Negative for cough and shortness of breath.    Cardiovascular: Negative for chest pain.   Gastrointestinal: Positive for constipation (manageable). Negative for diarrhea.   Genitourinary: Negative for difficulty urinating, dysuria and vaginal bleeding.   Neurological: Positive for headaches (migraines). Negative for dizziness.       Objective   Physical Exam  Vitals reviewed.   Constitutional:       General: She is not in acute distress.     Appearance: Normal appearance. She is not ill-appearing, toxic-appearing or diaphoretic.   HENT:      Head: Normocephalic and atraumatic.   Neck:      Vascular: No carotid bruit.   Cardiovascular:      Rate and Rhythm: Normal rate and regular rhythm.   Pulmonary:      Effort: Pulmonary effort is normal. No respiratory distress.      Breath sounds: Normal breath sounds.   Neurological:      Mental Status: She is alert and oriented to person, place, and time.      Cranial Nerves: No cranial nerve deficit.   Psychiatric:         Mood and Affect: Mood normal.         Thought Content: Thought content normal.       /72 (BP Location: Right arm, Patient Position: Sitting, Cuff Size: Adult)   Pulse 93   Temp 98 °F (36.7 °C) (Temporal)   Resp 16    "Ht 165.5 cm (65.16\")   Wt 74.8 kg (165 lb)   SpO2 100%   BMI 27.33 kg/m²     Assessment & Plan   Diagnoses and all orders for this visit:    1. Menstrual migraine without status migrainosus, not intractable (Primary)  -     rizatriptan (MAXALT) 10 MG tablet; Take 1 tablet by mouth 1 (One) Time As Needed for Migraine for up to 1 dose. May repeat in 2 hours if needed  Dispense: 9 tablet; Refill: 5    2. Depression with anxiety      Refilled maxalt for migraines, also gave samples of Nurtec and Qlipta to try. Patient will notify me if one of these works better and if she wants a prescription  Patient was encouraged to keep me informed of any acute changes, lack of improvement, or any new concerning symptoms.  Discussed importance of cancer screenings, patient declines referrals           "

## 2022-10-25 DIAGNOSIS — F41.8 DEPRESSION WITH ANXIETY: ICD-10-CM

## 2022-10-26 RX ORDER — BUPROPION HYDROCHLORIDE 300 MG/1
TABLET ORAL
Qty: 30 TABLET | OUTPATIENT
Start: 2022-10-26

## 2022-11-08 ENCOUNTER — OFFICE VISIT (OUTPATIENT)
Dept: FAMILY MEDICINE CLINIC | Facility: CLINIC | Age: 52
End: 2022-11-08

## 2022-11-08 ENCOUNTER — TELEPHONE (OUTPATIENT)
Dept: FAMILY MEDICINE CLINIC | Facility: CLINIC | Age: 52
End: 2022-11-08

## 2022-11-08 ENCOUNTER — LAB (OUTPATIENT)
Dept: LAB | Facility: HOSPITAL | Age: 52
End: 2022-11-08

## 2022-11-08 VITALS
HEIGHT: 65 IN | DIASTOLIC BLOOD PRESSURE: 86 MMHG | SYSTOLIC BLOOD PRESSURE: 122 MMHG | RESPIRATION RATE: 16 BRPM | WEIGHT: 159 LBS | TEMPERATURE: 96 F | HEART RATE: 96 BPM | OXYGEN SATURATION: 97 % | BODY MASS INDEX: 26.49 KG/M2

## 2022-11-08 DIAGNOSIS — R21: ICD-10-CM

## 2022-11-08 DIAGNOSIS — R10.12 ACUTE LUQ PAIN: Primary | ICD-10-CM

## 2022-11-08 DIAGNOSIS — K59.09 CHRONIC CONSTIPATION: ICD-10-CM

## 2022-11-08 DIAGNOSIS — Z12.11 COLON CANCER SCREENING: ICD-10-CM

## 2022-11-08 DIAGNOSIS — R10.12 ACUTE LUQ PAIN: ICD-10-CM

## 2022-11-08 PROBLEM — L93.0 LUPUS ERYTHEMATOSUS: Status: ACTIVE | Noted: 2022-11-08

## 2022-11-08 LAB
ALBUMIN SERPL-MCNC: 4.3 G/DL (ref 3.5–5.2)
ALBUMIN/GLOB SERPL: 1.2 G/DL
ALP SERPL-CCNC: 110 U/L (ref 39–117)
ALT SERPL W P-5'-P-CCNC: 15 U/L (ref 1–33)
AMYLASE SERPL-CCNC: 70 U/L (ref 28–100)
ANION GAP SERPL CALCULATED.3IONS-SCNC: 12 MMOL/L (ref 5–15)
AST SERPL-CCNC: 18 U/L (ref 1–32)
BASOPHILS # BLD AUTO: 0.05 10*3/MM3 (ref 0–0.2)
BASOPHILS NFR BLD AUTO: 0.5 % (ref 0–1.5)
BILIRUB SERPL-MCNC: 0.3 MG/DL (ref 0–1.2)
BUN SERPL-MCNC: 12 MG/DL (ref 6–20)
BUN/CREAT SERPL: 11.3 (ref 7–25)
CALCIUM SPEC-SCNC: 9.9 MG/DL (ref 8.6–10.5)
CHLORIDE SERPL-SCNC: 100 MMOL/L (ref 98–107)
CO2 SERPL-SCNC: 28 MMOL/L (ref 22–29)
CREAT SERPL-MCNC: 1.06 MG/DL (ref 0.57–1)
DEPRECATED RDW RBC AUTO: 43.7 FL (ref 37–54)
EGFRCR SERPLBLD CKD-EPI 2021: 64.5 ML/MIN/1.73
EOSINOPHIL # BLD AUTO: 0.11 10*3/MM3 (ref 0–0.4)
EOSINOPHIL NFR BLD AUTO: 1 % (ref 0.3–6.2)
ERYTHROCYTE [DISTWIDTH] IN BLOOD BY AUTOMATED COUNT: 13.5 % (ref 12.3–15.4)
GLOBULIN UR ELPH-MCNC: 3.6 GM/DL
GLUCOSE SERPL-MCNC: 98 MG/DL (ref 65–99)
HCT VFR BLD AUTO: 39.4 % (ref 34–46.6)
HGB BLD-MCNC: 12.8 G/DL (ref 12–15.9)
IMM GRANULOCYTES # BLD AUTO: 0.03 10*3/MM3 (ref 0–0.05)
IMM GRANULOCYTES NFR BLD AUTO: 0.3 % (ref 0–0.5)
LIPASE SERPL-CCNC: 25 U/L (ref 13–60)
LYMPHOCYTES # BLD AUTO: 2.45 10*3/MM3 (ref 0.7–3.1)
LYMPHOCYTES NFR BLD AUTO: 23.4 % (ref 19.6–45.3)
MCH RBC QN AUTO: 28.6 PG (ref 26.6–33)
MCHC RBC AUTO-ENTMCNC: 32.5 G/DL (ref 31.5–35.7)
MCV RBC AUTO: 88.1 FL (ref 79–97)
MONOCYTES # BLD AUTO: 0.89 10*3/MM3 (ref 0.1–0.9)
MONOCYTES NFR BLD AUTO: 8.5 % (ref 5–12)
NEUTROPHILS NFR BLD AUTO: 6.95 10*3/MM3 (ref 1.7–7)
NEUTROPHILS NFR BLD AUTO: 66.3 % (ref 42.7–76)
NRBC BLD AUTO-RTO: 0 /100 WBC (ref 0–0.2)
PLATELET # BLD AUTO: 461 10*3/MM3 (ref 140–450)
PMV BLD AUTO: 9.7 FL (ref 6–12)
POTASSIUM SERPL-SCNC: 3.9 MMOL/L (ref 3.5–5.2)
PROT SERPL-MCNC: 7.9 G/DL (ref 6–8.5)
RBC # BLD AUTO: 4.47 10*6/MM3 (ref 3.77–5.28)
SODIUM SERPL-SCNC: 140 MMOL/L (ref 136–145)
WBC NRBC COR # BLD: 10.48 10*3/MM3 (ref 3.4–10.8)

## 2022-11-08 PROCEDURE — 80053 COMPREHEN METABOLIC PANEL: CPT

## 2022-11-08 PROCEDURE — 83690 ASSAY OF LIPASE: CPT

## 2022-11-08 PROCEDURE — 85025 COMPLETE CBC W/AUTO DIFF WBC: CPT

## 2022-11-08 PROCEDURE — 82150 ASSAY OF AMYLASE: CPT

## 2022-11-08 PROCEDURE — 99214 OFFICE O/P EST MOD 30 MIN: CPT | Performed by: NURSE PRACTITIONER

## 2022-11-08 NOTE — TELEPHONE ENCOUNTER
Caller: Anel Perkins    Relationship: Self    Best call back number: [phone]    What is the best time to reach you: ANY     Who are you requesting to speak with (clinical staff, provider,  specific staff member): CLINICAL     What was the call regarding: RESULTS OF LABS TODAY WAS TOLD MY SRINIVAS TO CALL WHEN RESULTS ARE IN TO FIND OUT NEXT STEPS     Do you require a callback: YES

## 2022-11-08 NOTE — PROGRESS NOTES
"Subjective   Anel Dax Perkins is a 51 y.o. female.   Chief Complaint   Patient presents with   • Constipation       History of Present Illness   Patient is here with complaint of LLQ pain under her ribs, has chronic constipation, usually manageable. took a laxative 5 days ago, just had a small BM, repeated this over the next few days. Still only minimal stool. Gave herself an enema last night due to abdominal pain.   Had only a small BM after the enema. Had an episode of nausea and vomiting last night with the pain. Rarely drinks alcohol.   Pain is a little better today, took naproxen that helped some.   Has not eaten much today, did eat \"handful of nuts\" and peaches about 30 minutes before coming in. States pain was bad enough last night she thought about going to the ED.  Also has developed round disc lesions on arms and legs; does have Lupus but never had these before  Answers for HPI/ROS submitted by the patient on 11/8/2022  What is the primary reason for your visit?: Abdominal Pain      The following portions of the patient's history were reviewed and updated as appropriate: allergies, current medications, past family history, past medical history, past social history, past surgical history and problem list.    Review of Systems   Constitutional: Negative for chills and fever.   Gastrointestinal: Positive for abdominal pain, constipation, nausea and vomiting. Negative for diarrhea.   Genitourinary: Negative for dysuria, frequency and hematuria.   Musculoskeletal: Negative for arthralgias and myalgias.   Skin: Positive for rash.   Neurological: Negative for headaches.       Objective   Physical Exam  Vitals reviewed.   Constitutional:       Appearance: Normal appearance. She is ill-appearing.   HENT:      Head: Normocephalic and atraumatic.   Cardiovascular:      Rate and Rhythm: Normal rate and regular rhythm.      Heart sounds: Normal heart sounds.   Pulmonary:      Effort: Pulmonary effort is normal. No " "respiratory distress.      Breath sounds: Normal breath sounds.   Abdominal:      General: Bowel sounds are normal. There is no distension.      Palpations: Abdomen is soft.      Tenderness: There is abdominal tenderness (LUQ). There is no guarding or rebound.   Skin:     Findings: Lesion (discoid lesions, right forearm, flesh colored; legs pinkcolor) present.   Neurological:      Mental Status: She is alert.   Psychiatric:         Thought Content: Thought content normal.       /86 (BP Location: Right arm, Patient Position: Sitting, Cuff Size: Adult)   Pulse 96   Temp 96 °F (35.6 °C) (Temporal)   Resp 16   Ht 165.5 cm (65.16\")   Wt 72.1 kg (159 lb)   SpO2 97%   BMI 26.33 kg/m²     Assessment & Plan   Diagnoses and all orders for this visit:    1. Acute LUQ pain (Primary)  -     CT Abdomen Pelvis Without Contrast; Future  -     CBC Auto Differential; Future  -     Comprehensive Metabolic Panel; Future  -     Lipase; Future  -     Amylase; Future    2. Chronic constipation  -     CT Abdomen Pelvis Without Contrast; Future  -     Ambulatory Referral For Screening Colonoscopy    3. Colon cancer screening  -     Ambulatory Referral For Screening Colonoscopy    4. Acute discoid eruption of skin        Stat labs and CT scan ordered. Pancreatitis vs constipation with possible blockage or other problem  Gave samples of Linzess, instructed not to try until labs and CT results are back.   Referred for colonoscopy  Patient was encouraged to keep me informed of any acute changes, lack of improvement, or any new concerning symptoms.  Advised to go to the ED if symptoms worsen before we can get results  Schedule follow up with rheumatologist for new discoid lesions     "

## 2022-11-09 ENCOUNTER — TELEPHONE (OUTPATIENT)
Dept: FAMILY MEDICINE CLINIC | Facility: CLINIC | Age: 52
End: 2022-11-09

## 2022-11-09 ENCOUNTER — HOSPITAL ENCOUNTER (OUTPATIENT)
Dept: CT IMAGING | Facility: HOSPITAL | Age: 52
Discharge: HOME | End: 2022-11-09
Admitting: NURSE PRACTITIONER

## 2022-11-09 ENCOUNTER — HOSPITAL ENCOUNTER (EMERGENCY)
Facility: HOSPITAL | Age: 52
Discharge: HOME | End: 2022-11-09
Attending: EMERGENCY MEDICINE | Admitting: EMERGENCY MEDICINE

## 2022-11-09 VITALS
HEIGHT: 65 IN | SYSTOLIC BLOOD PRESSURE: 130 MMHG | OXYGEN SATURATION: 99 % | WEIGHT: 160 LBS | HEART RATE: 109 BPM | BODY MASS INDEX: 26.66 KG/M2 | DIASTOLIC BLOOD PRESSURE: 98 MMHG | RESPIRATION RATE: 16 BRPM | TEMPERATURE: 97.6 F

## 2022-11-09 DIAGNOSIS — R10.12 ACUTE LUQ PAIN: ICD-10-CM

## 2022-11-09 DIAGNOSIS — R31.29 OTHER MICROSCOPIC HEMATURIA: ICD-10-CM

## 2022-11-09 DIAGNOSIS — N13.4 HYDROURETER ON RIGHT: ICD-10-CM

## 2022-11-09 DIAGNOSIS — N20.1 URETEROLITHIASIS: Primary | ICD-10-CM

## 2022-11-09 DIAGNOSIS — K59.09 CHRONIC CONSTIPATION: ICD-10-CM

## 2022-11-09 DIAGNOSIS — R10.9 FLANK PAIN: ICD-10-CM

## 2022-11-09 DIAGNOSIS — N13.2 HYDRONEPHROSIS WITH URINARY OBSTRUCTION DUE TO URETERAL CALCULUS: ICD-10-CM

## 2022-11-09 LAB
ALBUMIN SERPL-MCNC: 4.2 G/DL (ref 3.5–5.2)
ALBUMIN/GLOB SERPL: 1.1 G/DL
ALP SERPL-CCNC: 111 U/L (ref 39–117)
ALT SERPL W P-5'-P-CCNC: 15 U/L (ref 1–33)
ANION GAP SERPL CALCULATED.3IONS-SCNC: 10 MMOL/L (ref 5–15)
AST SERPL-CCNC: 20 U/L (ref 1–32)
BACTERIA UR QL AUTO: ABNORMAL /HPF
BASOPHILS # BLD AUTO: 0.06 10*3/MM3 (ref 0–0.2)
BASOPHILS NFR BLD AUTO: 0.7 % (ref 0–1.5)
BILIRUB SERPL-MCNC: 0.3 MG/DL (ref 0–1.2)
BILIRUB UR QL STRIP: NEGATIVE
BUN SERPL-MCNC: 12 MG/DL (ref 6–20)
BUN/CREAT SERPL: 12 (ref 7–25)
CALCIUM SPEC-SCNC: 9.5 MG/DL (ref 8.6–10.5)
CHLORIDE SERPL-SCNC: 101 MMOL/L (ref 98–107)
CLARITY UR: ABNORMAL
CO2 SERPL-SCNC: 29 MMOL/L (ref 22–29)
COLOR UR: YELLOW
CREAT SERPL-MCNC: 1 MG/DL (ref 0.57–1)
DEPRECATED RDW RBC AUTO: 43.5 FL (ref 37–54)
EGFRCR SERPLBLD CKD-EPI 2021: 69.2 ML/MIN/1.73
EOSINOPHIL # BLD AUTO: 0.17 10*3/MM3 (ref 0–0.4)
EOSINOPHIL NFR BLD AUTO: 1.9 % (ref 0.3–6.2)
ERYTHROCYTE [DISTWIDTH] IN BLOOD BY AUTOMATED COUNT: 13.4 % (ref 12.3–15.4)
GLOBULIN UR ELPH-MCNC: 3.7 GM/DL
GLUCOSE SERPL-MCNC: 87 MG/DL (ref 65–99)
GLUCOSE UR STRIP-MCNC: NEGATIVE MG/DL
HCT VFR BLD AUTO: 38.9 % (ref 34–46.6)
HGB BLD-MCNC: 12.7 G/DL (ref 12–15.9)
HGB UR QL STRIP.AUTO: ABNORMAL
HYALINE CASTS UR QL AUTO: ABNORMAL /LPF
IMM GRANULOCYTES # BLD AUTO: 0.01 10*3/MM3 (ref 0–0.05)
IMM GRANULOCYTES NFR BLD AUTO: 0.1 % (ref 0–0.5)
KETONES UR QL STRIP: ABNORMAL
LEUKOCYTE ESTERASE UR QL STRIP.AUTO: ABNORMAL
LYMPHOCYTES # BLD AUTO: 2.4 10*3/MM3 (ref 0.7–3.1)
LYMPHOCYTES NFR BLD AUTO: 27.2 % (ref 19.6–45.3)
MCH RBC QN AUTO: 28.7 PG (ref 26.6–33)
MCHC RBC AUTO-ENTMCNC: 32.6 G/DL (ref 31.5–35.7)
MCV RBC AUTO: 88 FL (ref 79–97)
MONOCYTES # BLD AUTO: 0.71 10*3/MM3 (ref 0.1–0.9)
MONOCYTES NFR BLD AUTO: 8.1 % (ref 5–12)
NEUTROPHILS NFR BLD AUTO: 5.46 10*3/MM3 (ref 1.7–7)
NEUTROPHILS NFR BLD AUTO: 62 % (ref 42.7–76)
NITRITE UR QL STRIP: NEGATIVE
NRBC BLD AUTO-RTO: 0 /100 WBC (ref 0–0.2)
PH UR STRIP.AUTO: 5.5 [PH] (ref 5–8)
PLATELET # BLD AUTO: 478 10*3/MM3 (ref 140–450)
PMV BLD AUTO: 10.1 FL (ref 6–12)
POTASSIUM SERPL-SCNC: 3.7 MMOL/L (ref 3.5–5.2)
PROT SERPL-MCNC: 7.9 G/DL (ref 6–8.5)
PROT UR QL STRIP: ABNORMAL
RBC # BLD AUTO: 4.42 10*6/MM3 (ref 3.77–5.28)
RBC # UR STRIP: ABNORMAL /HPF
REF LAB TEST METHOD: ABNORMAL
SODIUM SERPL-SCNC: 140 MMOL/L (ref 136–145)
SP GR UR STRIP: 1.02 (ref 1–1.03)
SQUAMOUS #/AREA URNS HPF: ABNORMAL /HPF
UROBILINOGEN UR QL STRIP: ABNORMAL
WBC # UR STRIP: ABNORMAL /HPF
WBC NRBC COR # BLD: 8.81 10*3/MM3 (ref 3.4–10.8)

## 2022-11-09 PROCEDURE — 96360 HYDRATION IV INFUSION INIT: CPT

## 2022-11-09 PROCEDURE — 85025 COMPLETE CBC W/AUTO DIFF WBC: CPT | Performed by: EMERGENCY MEDICINE

## 2022-11-09 PROCEDURE — 74176 CT ABD & PELVIS W/O CONTRAST: CPT

## 2022-11-09 PROCEDURE — 99283 EMERGENCY DEPT VISIT LOW MDM: CPT

## 2022-11-09 PROCEDURE — 80053 COMPREHEN METABOLIC PANEL: CPT | Performed by: EMERGENCY MEDICINE

## 2022-11-09 PROCEDURE — 81001 URINALYSIS AUTO W/SCOPE: CPT | Performed by: EMERGENCY MEDICINE

## 2022-11-09 RX ORDER — ONDANSETRON 2 MG/ML
4 INJECTION INTRAMUSCULAR; INTRAVENOUS ONCE
Status: DISCONTINUED | OUTPATIENT
Start: 2022-11-09 | End: 2022-11-09 | Stop reason: HOSPADM

## 2022-11-09 RX ADMIN — SODIUM CHLORIDE 1000 ML: 9 INJECTION, SOLUTION INTRAVENOUS at 18:01

## 2022-11-09 NOTE — TELEPHONE ENCOUNTER
Radiology called wanting to speak with Chelsey about PT CT can. Spoke to Chelsey and she said to have it directed to Natasha as a covering provider.    Please advise   Dago Olea  [phone]

## 2022-11-09 NOTE — DISCHARGE INSTRUCTIONS
Drink plenty of fluids.  Motrin as we discussed.  Return to emergency department immediately for new or changing concerns or if you change your mind and would like a prescription for pain medication.    Follow-up with Dr. Radford, urology tomorrow.    Call to make an appointment with your PCP.  Follow-up in 1 week.    Please review the medications you are supposed to be taking according to prior physician recommendations. I have not changed your home medications during this visit. If your discharge instructions indicate that I have changed your home medications, this is not the case, and you should disregard. If you have any questions about the medication you should be taking at home, please call your physician.

## 2022-11-09 NOTE — TELEPHONE ENCOUNTER
Contacted patient to notify. Verbalized understanding   She reports that her pain is improving and she has been using Naproxen often.     I advised her that we will keep her updated after her CT scan to discuss further

## 2022-11-09 NOTE — ED PROVIDER NOTES
"Subjective   History of Present Illness  This patient is a pleasant 51-year-old female with left and right-sided flank pain over the last 3 or 4 days.  She has had intermittent nausea and went to see her PCP earlier today, prior to arrival.  She had a CT scan ordered that evidently showed a \"large kidney stone.\"  She was told to come the emergency department for potential procedure and removal.  Patient tells me she has been having increasing waves of pain.  She tells me the pain is getting slightly worse.  She is currently tolerating it well.  No vomiting.  No diarrhea.  No headache or vision changes.  No trauma.  No history of ureterolithiasis in the past.  She tells me she has a history of anxiety, arthritis, depression, fibromyalgia, IBS, lupus and migraine headaches.  Denies any history of CAD or CVA.  She has a family history of MS in her mom and CAD in her dad.  She is accompanied here by a family member.  Patient denies any chest pain.  Denies any fevers or chills.  Denies any dysuria.  Denies any hematuria.  She is pleasant, no acute distress and was evaluated in triage.  In summary, we have a 51-year-old female sent here by PCP after an abnormal CT scan.    Past medical history  Lupus, IBS, fibromyalgia, migraine headaches.  Negative for CAD or CVA.  Negative for ureterolithiasis    Family history  MS in mom.  CAD in dad        Review of Systems   Constitutional: Negative.  Negative for chills, fatigue, fever and unexpected weight change.   HENT: Negative for dental problem, ear pain, hearing loss and sinus pressure.    Eyes: Negative for pain and visual disturbance.   Respiratory: Negative for chest tightness and shortness of breath.    Cardiovascular: Negative for chest pain, palpitations and leg swelling.   Gastrointestinal: Positive for nausea. Negative for blood in stool, diarrhea and vomiting.   Genitourinary: Negative for difficulty urinating, dysuria, frequency, hematuria and urgency. "   Musculoskeletal: Positive for back pain. Negative for myalgias, neck pain and neck stiffness.        Flank pain bilaterally.   Neurological: Negative for seizures, syncope, speech difficulty, light-headedness and headaches.   Psychiatric/Behavioral: Negative for confusion.   All other systems reviewed and are negative.      Past Medical History:   Diagnosis Date   • Anxiety    • Arthritis     DDD   • Depression    • Fibromyalgia    • IBS (irritable bowel syndrome)    • Lupus (HCC)    • Migraine headache    • Tendinitis        Allergies   Allergen Reactions   • No Known Drug Allergy        Past Surgical History:   Procedure Laterality Date   • CHOLECYSTECTOMY     • DIAGNOSTIC LAPAROSCOPY     • ENDOMETRIAL ABLATION     • LASIK     • MANDIBLE SURGERY      and Reset       Family History   Problem Relation Age of Onset   • Multiple sclerosis Mother    • Heart attack Father    • Arthritis Sister    • Depression Daughter    • Obesity Daughter    • No Known Problems Son        Social History     Socioeconomic History   • Marital status:    Tobacco Use   • Smoking status: Never   • Smokeless tobacco: Never   Substance and Sexual Activity   • Alcohol use: No   • Drug use: No   • Sexual activity: Yes     Partners: Male           Objective   Physical Exam  Vitals and nursing note reviewed.   Constitutional:       General: She is not in acute distress.     Appearance: Normal appearance. She is well-developed and normal weight. She is not toxic-appearing.   HENT:      Head: Normocephalic and atraumatic.      Jaw: No trismus.      Right Ear: External ear normal.      Left Ear: External ear normal.      Nose: Nose normal.      Mouth/Throat:      Mouth: Mucous membranes are moist. Mucous membranes are not dry. No oral lesions.      Dentition: No dental abscesses.      Pharynx: Oropharynx is clear. No posterior oropharyngeal erythema or uvula swelling.      Tonsils: No tonsillar exudate or tonsillar abscesses.   Eyes:       General:         Right eye: No discharge.         Left eye: No discharge.      Extraocular Movements: Extraocular movements intact.      Conjunctiva/sclera: Conjunctivae normal.      Right eye: Right conjunctiva is not injected.      Left eye: Left conjunctiva is not injected.      Pupils: Pupils are equal, round, and reactive to light.   Neck:      Vascular: No JVD.      Trachea: No tracheal tenderness.   Cardiovascular:      Rate and Rhythm: Normal rate and regular rhythm.      Heart sounds: Normal heart sounds.     No friction rub. No gallop.   Pulmonary:      Effort: Pulmonary effort is normal.      Breath sounds: Normal breath sounds. No wheezing or rales.   Chest:      Chest wall: No tenderness.   Abdominal:      General: Bowel sounds are normal. There is no distension.      Palpations: Abdomen is soft. Abdomen is not rigid. There is no mass or pulsatile mass.      Tenderness: There is no abdominal tenderness. There is right CVA tenderness and left CVA tenderness. There is no guarding or rebound. Negative signs include McBurney's sign.      Comments: No signs of acute abdomen.  No pain at McBurney's point.  No pulsatile abdominal mass.   Musculoskeletal:         General: No tenderness or deformity. Normal range of motion.      Cervical back: Normal range of motion and neck supple. No rigidity. Normal range of motion.   Lymphadenopathy:      Cervical: No cervical adenopathy.   Skin:     General: Skin is warm and dry.      Capillary Refill: Capillary refill takes less than 2 seconds.      Findings: No erythema or rash.      Comments: No diaphoresis, lesions, nevi, petechia, purpura   Neurological:      Mental Status: She is alert and oriented to person, place, and time.      Cranial Nerves: No cranial nerve deficit.      Sensory: No sensory deficit.      Motor: No tremor or abnormal muscle tone.      Comments: 5/5 strength bilaterally with flexion and extension of fingers, wrist, elbows, knees and hips as well  as plantar and dorsiflexion of the foot.   Psychiatric:         Attention and Perception: She is attentive.         Speech: Speech normal.         Behavior: Behavior normal.         Thought Content: Thought content normal.         Judgment: Judgment normal.         Procedures           ED Course  ED Course as of 11/09/22 2143 Wed Nov 09, 2022 1705 I told the patient and her family that we would take good care of her.  I told her the first order of business was to track down her old CT image from earlier today to confirm the diagnosis.  I told her that after doing so, I may discuss the case with urology.  At this point, the patient does appear stable.  Mildly tachycardic.  I am going to order some fluids for her as well as basic labs.  We will look at kidney function, urinalysis CBC and CMP.  Patient is comfortable with the plan without question or complaint.  Final impression and plan following completion of work-up.  I told the patient that we were attempting to see patients in triage secondary to long wait time that would keep her posted and get a bed as they are available.  All questions were answered.  Patient was able to verbalize a full understanding of the plan.  Final impression and plan following completion of work-up. [COOKIE]   0730 I discussed the case with Dr. Radford, who is on-call for urology at approximately 5:15 PM Eastern time.  He was extremely helpful and took the patient's information.  As the patient is making urine and is not an uric, he did not believe the patient necessarily required admission here for surgery or procedure and wanted to offer that she could follow-up in the outpatient setting if she chose.  We will take a look at the patient's labs including CBC, CMP, urinalysis.  I will also reassess her pain status.  If she is doing well and would like to go the outpatient route I will certainly offer that to her.  If her labs look concerning, including but not limited to showing evidence  of an acute kidney injury, infection, or if the patient's pain is not well controlled, we will bring the patient into the hospitalist for further care.  Labs are currently pending.  Once they are back I plan to bring the patient back to reassess her and will make a determination at that time. [COOKIE]   1733 Urinalysis shows large amount leukocytes with no nitrites.  Too numerous to count red blood cells and white blood cells with no bacteria noted.  CBC is normal with a white count of 8.1.  Hemoglobin, hematocrit and platelet count reviewed and found to be 12.7, 38.9, 478 respectively. [COOKIE]   1808 I brought patient back from triage after her labs are back.  CMP is completely normal which is encouraging.  I talked to the patient by my conversation with Dr. Radford, urology.  I let her know that we were more than happy to have her follow-up as an outpatient if she would like to sleep in her own bed and go home tonight, assuming her pain was controlled and she was comfortable with this plan.  I told her that from a clinical and medical standpoint, I was comfortable with this and that her urologist was as well.  She has for couple minutes to think about it and I told her I would come back to discuss her decision and get which ever planned she was most comfortable with expedited and moving forward accordingly.  She is receiving IV fluid rehydration right now. [COOKIE]   4696 I reexamined the patient once again at approximately 6:20 PM Eastern time.  She tells me she would like to go home if at all possible.  Most of her pain is on the left side with a small 2 mm stone was noted.  She has no pain on the right side where the concerning larger stone is located.  Patient understands the difference between these 2 and the need for urologic evaluation tomorrow.  She would like to go home and tells me she is going to take Motrin.  She has bad reactions with narcotics such as Lortab by her own report.  She was able to work today and  tells me she think she will tolerate her discomfort well with simple Motrin.  She understands Dr. Radford has her information and that she should see him tomorrow.  She tells me she will call if she does not hear from them by approximately 9 AM tomorrow.  Impression will include ureterolithiasis on left and right.  Hydroureter and hydronephrosis on the right.  Hematuria.  Flank pain.  Patient is to follow-up with urology tomorrow.  I will give her the contact information for Dr. Antony.  Return immediately for new or changing concerns.  Drink plenty fluids and take over-the-counter medications as we discussed.  Patient was appreciative for care without question or complaint and will be discharged home accordingly. [COOKIE]      ED Course User Index  [COOKIE] Rony Edgar MD      Recent Results (from the past 24 hour(s))   Comprehensive Metabolic Panel    Collection Time: 11/09/22  4:55 PM    Specimen: Blood   Result Value Ref Range    Glucose 87 65 - 99 mg/dL    BUN 12 6 - 20 mg/dL    Creatinine 1.00 0.57 - 1.00 mg/dL    Sodium 140 136 - 145 mmol/L    Potassium 3.7 3.5 - 5.2 mmol/L    Chloride 101 98 - 107 mmol/L    CO2 29.0 22.0 - 29.0 mmol/L    Calcium 9.5 8.6 - 10.5 mg/dL    Total Protein 7.9 6.0 - 8.5 g/dL    Albumin 4.20 3.50 - 5.20 g/dL    ALT (SGPT) 15 1 - 33 U/L    AST (SGOT) 20 1 - 32 U/L    Alkaline Phosphatase 111 39 - 117 U/L    Total Bilirubin 0.3 0.0 - 1.2 mg/dL    Globulin 3.7 gm/dL    A/G Ratio 1.1 g/dL    BUN/Creatinine Ratio 12.0 7.0 - 25.0    Anion Gap 10.0 5.0 - 15.0 mmol/L    eGFR 69.2 >60.0 mL/min/1.73   CBC Auto Differential    Collection Time: 11/09/22  4:55 PM    Specimen: Blood   Result Value Ref Range    WBC 8.81 3.40 - 10.80 10*3/mm3    RBC 4.42 3.77 - 5.28 10*6/mm3    Hemoglobin 12.7 12.0 - 15.9 g/dL    Hematocrit 38.9 34.0 - 46.6 %    MCV 88.0 79.0 - 97.0 fL    MCH 28.7 26.6 - 33.0 pg    MCHC 32.6 31.5 - 35.7 g/dL    RDW 13.4 12.3 - 15.4 %    RDW-SD 43.5 37.0 - 54.0 fl    MPV 10.1 6.0 -  12.0 fL    Platelets 478 (H) 140 - 450 10*3/mm3    Neutrophil % 62.0 42.7 - 76.0 %    Lymphocyte % 27.2 19.6 - 45.3 %    Monocyte % 8.1 5.0 - 12.0 %    Eosinophil % 1.9 0.3 - 6.2 %    Basophil % 0.7 0.0 - 1.5 %    Immature Grans % 0.1 0.0 - 0.5 %    Neutrophils, Absolute 5.46 1.70 - 7.00 10*3/mm3    Lymphocytes, Absolute 2.40 0.70 - 3.10 10*3/mm3    Monocytes, Absolute 0.71 0.10 - 0.90 10*3/mm3    Eosinophils, Absolute 0.17 0.00 - 0.40 10*3/mm3    Basophils, Absolute 0.06 0.00 - 0.20 10*3/mm3    Immature Grans, Absolute 0.01 0.00 - 0.05 10*3/mm3    nRBC 0.0 0.0 - 0.2 /100 WBC   Urinalysis With Microscopic If Indicated (No Culture) - Urine, Clean Catch    Collection Time: 11/09/22  4:57 PM    Specimen: Urine, Clean Catch   Result Value Ref Range    Color, UA Yellow Yellow, Straw    Appearance, UA Cloudy (A) Clear    pH, UA 5.5 5.0 - 8.0    Specific Gravity, UA 1.017 1.001 - 1.030    Glucose, UA Negative Negative    Ketones, UA Trace (A) Negative    Bilirubin, UA Negative Negative    Blood, UA Moderate (2+) (A) Negative    Protein, UA Trace (A) Negative    Leuk Esterase, UA Large (3+) (A) Negative    Nitrite, UA Negative Negative    Urobilinogen, UA 0.2 E.U./dL 0.2 - 1.0 E.U./dL   Urinalysis, Microscopic Only - Urine, Clean Catch    Collection Time: 11/09/22  4:57 PM    Specimen: Urine, Clean Catch   Result Value Ref Range    RBC, UA Too Numerous to Count (A) None Seen, 0-2 /HPF    WBC, UA Too Numerous to Count (A) None Seen, 0-2 /HPF    Bacteria, UA None Seen None Seen, Trace /HPF    Squamous Epithelial Cells, UA 0-2 None Seen, 0-2 /HPF    Hyaline Casts, UA 0-6 0 - 6 /LPF    Methodology Automated Microscopy      Note: In addition to lab results from this visit, the labs listed above may include labs taken at another facility or during a different encounter within the last 24 hours. Please correlate lab times with ED admission and discharge times for further clarification of the services performed during this  "visit.    No orders to display     Vitals:    11/09/22 1640   BP: 130/98   Pulse: 109   Resp: 16   Temp: 97.6 °F (36.4 °C)   TempSrc: Oral   SpO2: 99%   Weight: 72.6 kg (160 lb)   Height: 165.1 cm (65\")     Medications   sodium chloride 0.9 % bolus 1,000 mL (0 mL Intravenous Stopped 11/9/22 1829)     ECG/EMG Results (last 24 hours)     ** No results found for the last 24 hours. **        No orders to display                                              MDM    Final diagnoses:   Ureterolithiasis   Hydronephrosis with urinary obstruction due to ureteral calculus   Hydroureter on right   Flank pain   Other microscopic hematuria       ED Disposition  ED Disposition     ED Disposition   Discharge    Condition   Stable    Comment   --             Marlyn Barbosa, APRN  [address] [id number] [phone]    In 1 week      Kalia Radford MD  [address] [address]  [phone]      Tomorrow.  Call if you do not hear from them by 9 AM as we discussed.         Medication List      No changes were made to your prescriptions during this visit.          Rony Edgar MD  11/09/22 2144    "

## 2022-11-09 NOTE — TELEPHONE ENCOUNTER
Called patient with below message per Dr. Brar. She understood and was going to the ER    I took a radiology call for Marlyn on this patient. Could someone call patient and have her sent to ER. ( has urinaryobstruction from Stones. Thanks

## 2022-11-28 ENCOUNTER — PATIENT MESSAGE (OUTPATIENT)
Dept: FAMILY MEDICINE CLINIC | Facility: CLINIC | Age: 52
End: 2022-11-28

## 2022-11-28 DIAGNOSIS — F51.01 PRIMARY INSOMNIA: Primary | ICD-10-CM

## 2022-11-29 RX ORDER — TRAZODONE HYDROCHLORIDE 50 MG/1
50 TABLET ORAL NIGHTLY
Qty: 90 TABLET | Refills: 1 | Status: SHIPPED | OUTPATIENT
Start: 2022-11-29 | End: 2023-04-03

## 2022-11-29 NOTE — TELEPHONE ENCOUNTER
From: Anel Perkins  To: Marlyn BarbosaVINH  Sent: 11/28/2022 4:41 PM EST  Subject: Sleep aid    Marlyn, would it be possible for you to call something in to help me sleep? For over a year I have been taking ZzzQuil, but it just isn't enough anymore. I have also tried melatonin. I can't usually fall asleep most nights until after I've been laying down for at least 2 hours. Most nights I wind up getting back out of bed between 12:00 and 1:00 a.m. and am up for 2 to 4 hours before I'm finally tired enough to be able to sleep on my own. Lately, however, I have been up almost all night the past 3 or 4 nights. It's definitely time to try something that is not over the counter. When I first had my son, I had trouble sleeping. My gynecologist prescribed a sleep aid, but it made me so tired that if I had to get up in the middle of the night I had to hold on to the wall to be able to walk without falling and it took me hours the next morning to have a clear head without being groggy. I had to stop taking it so that I could care for my son. I have been hesitant to ask for anything else   over the years. My daughter takes a medicine called trazodone, I think. I think that is also used to treat depression or anxiety, too. She said that she does not wake up feeling groggy and is able to sleep really well with that one. Anyway, I don't mind trying anything you want to put me on, but I definitely need something.

## 2023-04-03 DIAGNOSIS — F41.8 DEPRESSION WITH ANXIETY: ICD-10-CM

## 2023-04-03 DIAGNOSIS — F51.01 PRIMARY INSOMNIA: ICD-10-CM

## 2023-04-03 RX ORDER — TRAZODONE HYDROCHLORIDE 50 MG/1
TABLET ORAL
Qty: 90 TABLET | Refills: 1 | Status: SHIPPED | OUTPATIENT
Start: 2023-04-03

## 2023-04-03 RX ORDER — BUPROPION HYDROCHLORIDE 300 MG/1
TABLET ORAL
Qty: 30 TABLET | Refills: 2 | Status: SHIPPED | OUTPATIENT
Start: 2023-04-03

## 2023-04-03 NOTE — TELEPHONE ENCOUNTER
Rx Refill Note  Requested Prescriptions     Pending Prescriptions Disp Refills   • traZODone (DESYREL) 50 MG tablet [Pharmacy Med Name: TRAZODONE 50 MG TABLET] 90 tablet 1     Sig: TAKE 1 TABLET BY MOUTH EVERY DAY AT NIGHT   • buPROPion XL (WELLBUTRIN XL) 300 MG 24 hr tablet [Pharmacy Med Name: BUPROPION HCL  MG TABLET] 30 tablet      Sig: TAKE 1 TABLET BY MOUTH EVERY DAY      Last office visit with prescribing clinician: 11/8/2022   Last telemedicine visit with prescribing clinician: Visit date not found   Next office visit with prescribing clinician: Visit date not found                         Would you like a call back once the refill request has been completed: [] Yes [] No    If the office needs to give you a call back, can they leave a voicemail: [] Yes [] No    April ESPERANZA Ferrer  04/03/23, 13:40 EDT

## 2023-04-17 DIAGNOSIS — F41.8 DEPRESSION WITH ANXIETY: ICD-10-CM

## 2023-04-17 RX ORDER — BUPROPION HYDROCHLORIDE 300 MG/1
TABLET ORAL
Qty: 90 TABLET | OUTPATIENT
Start: 2023-04-17

## 2023-06-04 DIAGNOSIS — F41.8 DEPRESSION WITH ANXIETY: ICD-10-CM

## 2023-06-05 RX ORDER — BUPROPION HYDROCHLORIDE 300 MG/1
TABLET ORAL
Qty: 30 TABLET | Refills: 2 | Status: SHIPPED | OUTPATIENT
Start: 2023-06-05

## 2023-08-18 DIAGNOSIS — G43.829 MENSTRUAL MIGRAINE WITHOUT STATUS MIGRAINOSUS, NOT INTRACTABLE: ICD-10-CM

## 2023-08-22 RX ORDER — RIZATRIPTAN BENZOATE 10 MG/1
10 TABLET ORAL ONCE AS NEEDED
Qty: 9 TABLET | Refills: 1 | Status: SHIPPED | OUTPATIENT
Start: 2023-08-22

## 2023-08-22 NOTE — TELEPHONE ENCOUNTER
Rx Refill Note  Requested Prescriptions     Pending Prescriptions Disp Refills    rizatriptan (MAXALT) 10 MG tablet [Pharmacy Med Name: RIZATRIPTAN 10 MG TABLET] 9 tablet 5     Sig: TAKE 1 TABLET BY MOUTH 1 (ONE) TIME AS NEEDED FOR MIGRAINE FOR UP TO 1 DOSE. MAY REPEAT IN 2 HOURS IF NEEDED      Last office visit with prescribing clinician: 11/8/2022   Next office visit with prescribing clinician: 9/8/2023   Domenica Hood MA  08/22/23, 08:08 EDT

## 2023-09-07 RX ORDER — TAMSULOSIN HYDROCHLORIDE 0.4 MG/1
CAPSULE ORAL
COMMUNITY
End: 2023-09-08

## 2023-09-07 RX ORDER — FOLIC ACID 1 MG/1
2 TABLET ORAL DAILY
COMMUNITY
Start: 2023-06-27

## 2023-09-07 RX ORDER — POTASSIUM CHLORIDE 750 MG/1
TABLET, FILM COATED, EXTENDED RELEASE ORAL
COMMUNITY

## 2023-09-07 RX ORDER — MIRABEGRON 50 MG/1
1 TABLET, FILM COATED, EXTENDED RELEASE ORAL DAILY
COMMUNITY
Start: 2023-06-24 | End: 2023-09-08

## 2023-09-07 RX ORDER — PHENAZOPYRIDINE HYDROCHLORIDE 100 MG/1
TABLET, FILM COATED ORAL
COMMUNITY
End: 2023-09-08

## 2023-09-07 RX ORDER — PHENAZOPYRIDINE HYDROCHLORIDE 200 MG/1
200 TABLET, FILM COATED ORAL 3 TIMES DAILY PRN
COMMUNITY
End: 2023-09-08

## 2023-09-08 ENCOUNTER — OFFICE VISIT (OUTPATIENT)
Dept: FAMILY MEDICINE CLINIC | Facility: CLINIC | Age: 53
End: 2023-09-08
Payer: COMMERCIAL

## 2023-09-08 VITALS
DIASTOLIC BLOOD PRESSURE: 74 MMHG | OXYGEN SATURATION: 97 % | HEIGHT: 65 IN | WEIGHT: 167.6 LBS | TEMPERATURE: 98.1 F | SYSTOLIC BLOOD PRESSURE: 112 MMHG | HEART RATE: 82 BPM | BODY MASS INDEX: 27.92 KG/M2

## 2023-09-08 DIAGNOSIS — K58.1 IRRITABLE BOWEL SYNDROME WITH CONSTIPATION: ICD-10-CM

## 2023-09-08 DIAGNOSIS — M79.7 FIBROMYALGIA: ICD-10-CM

## 2023-09-08 DIAGNOSIS — Z12.11 COLON CANCER SCREENING: ICD-10-CM

## 2023-09-08 DIAGNOSIS — K59.09 CHRONIC CONSTIPATION: ICD-10-CM

## 2023-09-08 DIAGNOSIS — M32.9 SLE (SYSTEMIC LUPUS ERYTHEMATOSUS RELATED SYNDROME): ICD-10-CM

## 2023-09-08 DIAGNOSIS — F41.8 DEPRESSION WITH ANXIETY: ICD-10-CM

## 2023-09-08 DIAGNOSIS — Z00.00 ANNUAL PHYSICAL EXAM: Primary | ICD-10-CM

## 2023-09-08 DIAGNOSIS — F51.01 PRIMARY INSOMNIA: ICD-10-CM

## 2023-09-08 DIAGNOSIS — G43.829 MENSTRUAL MIGRAINE WITHOUT STATUS MIGRAINOSUS, NOT INTRACTABLE: ICD-10-CM

## 2023-09-08 PROCEDURE — 99396 PREV VISIT EST AGE 40-64: CPT | Performed by: NURSE PRACTITIONER

## 2023-09-08 RX ORDER — RIZATRIPTAN BENZOATE 10 MG/1
10 TABLET ORAL ONCE AS NEEDED
Qty: 9 TABLET | Refills: 4 | Status: SHIPPED | OUTPATIENT
Start: 2023-09-08

## 2023-09-08 RX ORDER — TRAZODONE HYDROCHLORIDE 50 MG/1
50-100 TABLET ORAL
Qty: 180 TABLET | Refills: 1 | Status: SHIPPED | OUTPATIENT
Start: 2023-09-08

## 2023-09-08 RX ORDER — LUBIPROSTONE 8 UG/1
8 CAPSULE ORAL
Qty: 90 CAPSULE | Refills: 1 | Status: SHIPPED | OUTPATIENT
Start: 2023-09-08

## 2023-09-08 RX ORDER — BUPROPION HYDROCHLORIDE 300 MG/1
300 TABLET ORAL DAILY
Qty: 90 TABLET | Refills: 3 | Status: SHIPPED | OUTPATIENT
Start: 2023-09-08

## 2023-09-08 NOTE — PROGRESS NOTES
Patient Care Team:  Marlyn Barbosa APRN as PCP - General (Nurse Practitioner)     Chief Complaint   Patient presents with    Insomnia       Chief complaint: Patient is in today for a physical     Patient is a 52 y.o. female who presents for her yearly physical exam.     Insomnia  Associated symptoms include arthralgias, congestion, fatigue, headaches (migraines; controlled), joint swelling (ankles), myalgias and neck pain. Pertinent negatives include no abdominal pain, chest pain, chills, coughing, diaphoresis, fever, numbness, rash or sore throat.    Sees rheumatology for Lupus   Tries to eat a healthy diet; no regular exercise, has chronic joint pain.  Has been taking 50 mg trazodone for insomnia, this has had some benefit but still often only sleeps a few hours a night and has difficulty falling asleep.  Patient declines mammogram order, states she had a very painful experience with diagnostic mammogram and ultrasound in the past, she does self breast exams, but does not want to do a mammogram.  She is willing to go ahead and schedule a colonoscopy.  States she has a gynecologist and will schedule a Pap in the future.  Patient had labs done with rheumatology in June, declines to have any labs done in office today but does agree to ask the rheumatologist to do her lipid panel with the next blood draw.  Review of Systems   Constitutional:  Positive for fatigue. Negative for chills, diaphoresis, fever and unexpected weight change.   HENT:  Positive for congestion. Negative for ear pain, sinus pressure, sore throat and tinnitus.    Eyes:  Negative for photophobia, pain, redness and visual disturbance.   Respiratory:  Negative for cough, shortness of breath and wheezing.    Cardiovascular:  Positive for leg swelling (occ ankles). Negative for chest pain and palpitations.   Gastrointestinal:  Positive for constipation. Negative for abdominal pain and blood in stool.   Genitourinary:  Negative for difficulty  urinating, dysuria, menstrual problem and vaginal bleeding.   Musculoskeletal:  Positive for arthralgias, back pain, joint swelling (ankles), myalgias and neck pain.   Skin:  Negative for color change, pallor, rash and wound.   Allergic/Immunologic: Positive for environmental allergies.   Neurological:  Positive for headaches (migraines; controlled). Negative for dizziness, tremors, syncope, light-headedness and numbness.   Psychiatric/Behavioral:  Positive for dysphoric mood (controlled with med) and sleep disturbance. Negative for self-injury and suicidal ideas. The patient is nervous/anxious (controlled with med) and has insomnia.     History  Past Medical History:   Diagnosis Date    Anxiety     Arthritis     DDD    Depression     Fibromyalgia     IBS (irritable bowel syndrome)     Lupus     Migraine headache     Tendinitis       Past Surgical History:   Procedure Laterality Date    CHOLECYSTECTOMY      DIAGNOSTIC LAPAROSCOPY      ENDOMETRIAL ABLATION      LASIK      MANDIBLE SURGERY      and Reset      Allergies   Allergen Reactions    No Known Drug Allergy       Family History   Problem Relation Age of Onset    Multiple sclerosis Mother     Heart attack Father     Arthritis Sister     Depression Daughter     Obesity Daughter     No Known Problems Son      Social History     Socioeconomic History    Marital status:    Tobacco Use    Smoking status: Never     Passive exposure: Never    Smokeless tobacco: Never   Vaping Use    Vaping Use: Never used   Substance and Sexual Activity    Alcohol use: No    Drug use: No    Sexual activity: Yes     Partners: Male        Current Outpatient Medications:     acetaminophen (TYLENOL) 500 MG tablet, Tylenol Extra Strength 500 mg tablet  As needed, Disp: , Rfl:     baclofen (LIORESAL) 20 MG tablet, Take 1 tablet by mouth 4 (Four) Times a Day., Disp: , Rfl:     buPROPion XL (WELLBUTRIN XL) 300 MG 24 hr tablet, Take 1 tablet by mouth Daily., Disp: 90 tablet, Rfl: 3     Cholecalciferol (VITAMIN D) 2000 units capsule, Take  by mouth., Disp: , Rfl:     folic acid (FOLVITE) 1 MG tablet, Take 2 tablets by mouth Daily., Disp: , Rfl:     gabapentin (NEURONTIN) 100 MG capsule, Take 3 capsules by mouth Every Night., Disp: , Rfl:     Ibuprofen 200 MG capsule, Take  by mouth., Disp: , Rfl:     lansoprazole (PREVACID) 30 MG capsule, , Disp: , Rfl: 0    methotrexate 2.5 MG tablet, Take 6 tablets by mouth 1 (One) Time Per Week., Disp: , Rfl:     potassium chloride 10 MEQ CR tablet, Take  by mouth., Disp: , Rfl:     rizatriptan (MAXALT) 10 MG tablet, Take 1 tablet by mouth 1 (One) Time As Needed for Migraine. May repeat in 2 hours if needed, Disp: 9 tablet, Rfl: 4    traMADol (ULTRAM) 50 MG tablet, Take  by mouth 3 (Three) Times a Day., Disp: , Rfl:     traZODone (DESYREL) 50 MG tablet, Take 1-2 tablets by mouth every night at bedtime., Disp: 180 tablet, Rfl: 1    lubiprostone (Amitiza) 8 MCG capsule, Take 1 capsule by mouth Daily With Breakfast., Disp: 90 capsule, Rfl: 1    Immunizations   N/A   Prescribed/Refused   Date     Td/Tdap  (Booster Q 10 yrs)   []           Prescribed    []     Refused        []           Flu  (Yearly)   []        Prescribed    []     Refused        []           Pneumonia      []        Prescribed    []     Refused        []                 Hep B     []        Prescribed    []     Refused        []           Shingles  (Age 50 and older)   []        Prescribed    []     Refused        []           Immunization History   Administered Date(s) Administered    COVID-19 (MODERNA) 1st,2nd,3rd Dose Monovalent 12/30/2020, 01/27/2021, 11/17/2021     Health Maintenance   Topic Date Due    MAMMOGRAM  Never done    COLORECTAL CANCER SCREENING  Never done    TDAP/TD VACCINES (1 - Tdap) Never done    PAP SMEAR  08/25/2020    COVID-19 Vaccine (4 - Moderna series) 01/12/2022    ZOSTER VACCINE (1 of 2) Never done    ANNUAL PHYSICAL  12/16/2022    INFLUENZA VACCINE  10/01/2023    BMI  FOLLOWUP  09/08/2024    HEPATITIS C SCREENING  Completed    Pneumococcal Vaccine 0-64  Aged Out        Diabetes  [] Yes  [] No   N/A      Date     Eye Exam     []             []   Complete     []   Recommended Date:  Where:       Foot Exam     []         []   Complete          Obesity Counseling     []       []   Complete     No results found for: HGBA1C, MICROALBUR    Additional Testing      Date     Colorectal Screening       []   N/A   []   Complete    [x]   Ordered     Date:    Where:       Pap      []   N/A   []   Complete   [x]   OB/GYN Date:    Where:       Mammogram        []   N/A   []   Complete   []  OB/GYN   []   Ordered Date:    Where:     PSA  (Over age 50)    []   N/A   []   Complete   []   Ordered Date:    Where:     US Aorta  (For male smokers, age 65)     []   N/A   []   Complete   []   Ordered Date:    Where:     CT for Smoker  (Age 55-75, 30 pk yr)    []   N/A   []   Complete   []   Ordered Date:    Where:     Bone Density/DEXA      []   N/A   []   Complete   []   Ordered Date:    Follow-up:     Hep. C        []   N/A   []   Complete   []   Ordered Date:    Where:     Results for orders placed or performed during the hospital encounter of 11/09/22   Comprehensive Metabolic Panel    Specimen: Blood   Result Value Ref Range    Glucose 87 65 - 99 mg/dL    BUN 12 6 - 20 mg/dL    Creatinine 1.00 0.57 - 1.00 mg/dL    Sodium 140 136 - 145 mmol/L    Potassium 3.7 3.5 - 5.2 mmol/L    Chloride 101 98 - 107 mmol/L    CO2 29.0 22.0 - 29.0 mmol/L    Calcium 9.5 8.6 - 10.5 mg/dL    Total Protein 7.9 6.0 - 8.5 g/dL    Albumin 4.20 3.50 - 5.20 g/dL    ALT (SGPT) 15 1 - 33 U/L    AST (SGOT) 20 1 - 32 U/L    Alkaline Phosphatase 111 39 - 117 U/L    Total Bilirubin 0.3 0.0 - 1.2 mg/dL    Globulin 3.7 gm/dL    A/G Ratio 1.1 g/dL    BUN/Creatinine Ratio 12.0 7.0 - 25.0    Anion Gap 10.0 5.0 - 15.0 mmol/L    eGFR 69.2 >60.0 mL/min/1.73   Urinalysis With Microscopic If Indicated (No Culture) - Urine, Clean Catch     Specimen: Urine, Clean Catch   Result Value Ref Range    Color, UA Yellow Yellow, Straw    Appearance, UA Cloudy (A) Clear    pH, UA 5.5 5.0 - 8.0    Specific Gravity, UA 1.017 1.001 - 1.030    Glucose, UA Negative Negative    Ketones, UA Trace (A) Negative    Bilirubin, UA Negative Negative    Blood, UA Moderate (2+) (A) Negative    Protein, UA Trace (A) Negative    Leuk Esterase, UA Large (3+) (A) Negative    Nitrite, UA Negative Negative    Urobilinogen, UA 0.2 E.U./dL 0.2 - 1.0 E.U./dL   CBC Auto Differential    Specimen: Blood   Result Value Ref Range    WBC 8.81 3.40 - 10.80 10*3/mm3    RBC 4.42 3.77 - 5.28 10*6/mm3    Hemoglobin 12.7 12.0 - 15.9 g/dL    Hematocrit 38.9 34.0 - 46.6 %    MCV 88.0 79.0 - 97.0 fL    MCH 28.7 26.6 - 33.0 pg    MCHC 32.6 31.5 - 35.7 g/dL    RDW 13.4 12.3 - 15.4 %    RDW-SD 43.5 37.0 - 54.0 fl    MPV 10.1 6.0 - 12.0 fL    Platelets 478 (H) 140 - 450 10*3/mm3    Neutrophil % 62.0 42.7 - 76.0 %    Lymphocyte % 27.2 19.6 - 45.3 %    Monocyte % 8.1 5.0 - 12.0 %    Eosinophil % 1.9 0.3 - 6.2 %    Basophil % 0.7 0.0 - 1.5 %    Immature Grans % 0.1 0.0 - 0.5 %    Neutrophils, Absolute 5.46 1.70 - 7.00 10*3/mm3    Lymphocytes, Absolute 2.40 0.70 - 3.10 10*3/mm3    Monocytes, Absolute 0.71 0.10 - 0.90 10*3/mm3    Eosinophils, Absolute 0.17 0.00 - 0.40 10*3/mm3    Basophils, Absolute 0.06 0.00 - 0.20 10*3/mm3    Immature Grans, Absolute 0.01 0.00 - 0.05 10*3/mm3    nRBC 0.0 0.0 - 0.2 /100 WBC   Urinalysis, Microscopic Only - Urine, Clean Catch    Specimen: Urine, Clean Catch   Result Value Ref Range    RBC, UA Too Numerous to Count (A) None Seen, 0-2 /HPF    WBC, UA Too Numerous to Count (A) None Seen, 0-2 /HPF    Bacteria, UA None Seen None Seen, Trace /HPF    Squamous Epithelial Cells, UA 0-2 None Seen, 0-2 /HPF    Hyaline Casts, UA 0-6 0 - 6 /LPF    Methodology Automated Microscopy             /74 (BP Location: Right arm, Patient Position: Sitting, Cuff Size: Adult)   Pulse 82    "Temp 98.1 °F (36.7 °C) (Oral)   Ht 165.1 cm (65\")   Wt 76 kg (167 lb 9.6 oz)   SpO2 97%   BMI 27.89 kg/m²       Physical Exam  Vitals and nursing note reviewed.   Constitutional:       General: She is not in acute distress.     Appearance: Normal appearance. She is well-developed. She is not diaphoretic.   HENT:      Head: Normocephalic and atraumatic.      Right Ear: External ear normal.      Left Ear: External ear normal.      Nose: Nose normal.   Eyes:      General: No scleral icterus.        Right eye: No discharge.         Left eye: No discharge.      Conjunctiva/sclera: Conjunctivae normal.      Pupils: Pupils are equal, round, and reactive to light.   Neck:      Thyroid: No thyromegaly.      Vascular: No carotid bruit or JVD.      Trachea: No tracheal deviation.   Cardiovascular:      Rate and Rhythm: Normal rate and regular rhythm.      Heart sounds: Normal heart sounds. No murmur heard.    No friction rub. No gallop.   Pulmonary:      Effort: Pulmonary effort is normal. No respiratory distress.      Breath sounds: Normal breath sounds. No stridor. No wheezing or rales.   Chest:      Chest wall: No tenderness.   Abdominal:      General: Bowel sounds are normal. There is no distension.      Palpations: Abdomen is soft. There is no mass.      Tenderness: There is no abdominal tenderness. There is no guarding or rebound.      Hernia: No hernia is present.   Musculoskeletal:         General: No tenderness or deformity.      Cervical back: Normal range of motion and neck supple.      Right lower leg: No edema.      Left lower leg: No edema.   Lymphadenopathy:      Cervical: No cervical adenopathy.   Skin:     General: Skin is warm and dry.      Coloration: Skin is not pale.      Findings: No erythema or rash.   Neurological:      Mental Status: She is alert and oriented to person, place, and time.      Cranial Nerves: No cranial nerve deficit.      Motor: No abnormal muscle tone.      Coordination: " Coordination normal.      Deep Tendon Reflexes: Reflexes are normal and symmetric. Reflexes normal.   Psychiatric:         Behavior: Behavior normal.         Thought Content: Thought content normal.         Judgment: Judgment normal.           Counseling provided on diet and nutrition, exercise, mental health concerns, insomnia, and breast cancer and colon cancer screening .    Diagnoses and all orders for this visit:    Annual physical exam    Primary insomnia  -     traZODone (DESYREL) 50 MG tablet; Take 1-2 tablets by mouth every night at bedtime.    SLE (systemic lupus erythematosus related syndrome)    Fibromyalgia    Irritable bowel syndrome with constipation  -     lubiprostone (Amitiza) 8 MCG capsule; Take 1 capsule by mouth Daily With Breakfast.    Chronic constipation  -     lubiprostone (Amitiza) 8 MCG capsule; Take 1 capsule by mouth Daily With Breakfast.    Colon cancer screening  -     Ambulatory Referral For Screening Colonoscopy    Depression with anxiety  -     buPROPion XL (WELLBUTRIN XL) 300 MG 24 hr tablet; Take 1 tablet by mouth Daily.    Menstrual migraine without status migrainosus, not intractable  -     rizatriptan (MAXALT) 10 MG tablet; Take 1 tablet by mouth 1 (One) Time As Needed for Migraine. May repeat in 2 hours if needed    Other orders  -     Discontinue: tamsulosin (FLOMAX) 0.4 MG capsule 24 hr capsule; tamsulosin 0.4 mg capsule   TAKE 1 CAPSULE BY MOUTH EVERY DAY  -     potassium chloride 10 MEQ CR tablet; Take  by mouth.  -     Discontinue: phenazopyridine (PYRIDIUM) 200 MG tablet; Take 1 tablet by mouth 3 (Three) Times a Day As Needed.  -     Discontinue: phenazopyridine (PYRIDIUM) 100 MG tablet; phenazopyridine 100 mg tablet   TAKE 1 TABLET BY MOUTH THREE TIMES DAILY AS NEEDED  -     Discontinue: Myrbetriq 50 MG tablet sustained-release 24 hour 24 hr tablet; Take 50 mg by mouth Daily.  -     methotrexate 2.5 MG tablet; Take 6 tablets by mouth 1 (One) Time Per Week.  -     folic  acid (FOLVITE) 1 MG tablet; Take 2 tablets by mouth Daily.       Nutrition and activity goals reviewed including: mainly water to drink, limit white flour/processed sugar, and processed foods, choose fresh fruits, vegetables, fish, lean meats,high fiber carbs, exercise  working toward 150 mins cardio per week, weight training 2x/week.  Discussed the importance of routine cancer screenings, patient declines mammogram order, agrees to contact her gynecologist, and agrees to get colonoscopy done  Insomnia is not well controlled, increase trazodone up to 2 tablets nightly as needed  We will try Amitiza for chronic constipation, if insurance denies coverage can try Linzess.  Has tried MiraLAX and stool softeners without long-term success  Followed by rheumatology for lupus and chronic joint pain  Depression and anxiety are improved with Wellbutrin  Migraines controlled with Maxalt  Patient will request that rheumatologist ordered lipid panel with next labs  Patient was encouraged to keep me informed of any acute changes, lack of improvement, or any new concerning symptoms.    VINH Shepard   9/10/2023   15:06 EDT          Please note that portions of this document were completed with a voice recognition program.     At [facility], we believe that sharing information builds trust and better relationships. You are receiving this note because you are receiving care at [facility] or have recently visited. It is possible you will see health information before a provider has talked with you about it. This kind of information can be easy to misunderstand. To help you fully understand what it means for your health, we urge you to discuss this note with your provider.

## 2023-11-05 DIAGNOSIS — F51.01 PRIMARY INSOMNIA: ICD-10-CM

## 2023-11-06 RX ORDER — TRAZODONE HYDROCHLORIDE 50 MG/1
50 TABLET ORAL
Qty: 90 TABLET | Refills: 1 | OUTPATIENT
Start: 2023-11-06

## 2024-01-29 DIAGNOSIS — F51.01 PRIMARY INSOMNIA: ICD-10-CM

## 2024-01-31 RX ORDER — TRAZODONE HYDROCHLORIDE 50 MG/1
50-100 TABLET ORAL
Qty: 180 TABLET | Refills: 1 | Status: SHIPPED | OUTPATIENT
Start: 2024-01-31

## 2024-06-20 ENCOUNTER — OFFICE VISIT (OUTPATIENT)
Dept: FAMILY MEDICINE CLINIC | Facility: CLINIC | Age: 54
End: 2024-06-20
Payer: COMMERCIAL

## 2024-06-20 VITALS
SYSTOLIC BLOOD PRESSURE: 116 MMHG | HEART RATE: 82 BPM | WEIGHT: 158 LBS | OXYGEN SATURATION: 97 % | HEIGHT: 65 IN | BODY MASS INDEX: 26.33 KG/M2 | DIASTOLIC BLOOD PRESSURE: 70 MMHG

## 2024-06-20 DIAGNOSIS — G43.829 MENSTRUAL MIGRAINE WITHOUT STATUS MIGRAINOSUS, NOT INTRACTABLE: ICD-10-CM

## 2024-06-20 DIAGNOSIS — Z13.220 SCREENING FOR LIPID DISORDERS: ICD-10-CM

## 2024-06-20 DIAGNOSIS — F41.8 DEPRESSION WITH ANXIETY: ICD-10-CM

## 2024-06-20 DIAGNOSIS — R41.840 IMPAIRED CONCENTRATION: ICD-10-CM

## 2024-06-20 DIAGNOSIS — F51.01 PRIMARY INSOMNIA: Primary | ICD-10-CM

## 2024-06-20 PROCEDURE — 99214 OFFICE O/P EST MOD 30 MIN: CPT | Performed by: STUDENT IN AN ORGANIZED HEALTH CARE EDUCATION/TRAINING PROGRAM

## 2024-06-20 RX ORDER — OLOPATADINE HYDROCHLORIDE AND MOMETASONE FUROATE 25; 665 UG/1; UG/1
SPRAY, METERED NASAL
COMMUNITY
Start: 2024-05-30

## 2024-06-20 RX ORDER — TRAZODONE HYDROCHLORIDE 50 MG/1
50-100 TABLET ORAL
Qty: 180 TABLET | Refills: 1 | Status: SHIPPED | OUTPATIENT
Start: 2024-06-20

## 2024-06-20 RX ORDER — RIZATRIPTAN BENZOATE 10 MG/1
10 TABLET ORAL ONCE AS NEEDED
Qty: 9 TABLET | Refills: 4 | Status: SHIPPED | OUTPATIENT
Start: 2024-06-20

## 2024-06-20 RX ORDER — BUPROPION HYDROCHLORIDE 300 MG/1
300 TABLET ORAL DAILY
Qty: 90 TABLET | Refills: 3 | Status: SHIPPED | OUTPATIENT
Start: 2024-06-20

## 2024-06-20 NOTE — PROGRESS NOTES
Chief Complaint   Patient presents with    Insomnia    Migraine       HPI:  Anel Perkins is a 53 y.o. female with fibromyalgia syndrome, SLE, seasonal allergies who presents today for FU of chronic conditions below.     Previously followed w Marlyn Barbosa. Was last seen by PCP in September 2023 for annual exam.     Follows w Rheumatology for SLE and Fibromyalgia. On Methotrexate therapy. Gets labs routinely through there office.     Takes Trazodone nightly as needed for sleep and this does help.     Takes Wellbutrin for mood and reports symptoms are controlled w this.     Struggling with focus, difficulty accomplishing tasks. Would like to discuss screening for ADHD. Struggled with grades in school. Daugther has ADD and recognizes some of the same symptoms.     PE:  Vitals:    06/20/24 1118   BP: 116/70   Pulse: 82   SpO2: 97%      Body mass index is 26.29 kg/m².    Gen Appearance: NAD  HEENT: Normocephalic, EOMI  Heart: RRR, normal S1 and S2, no murmur  Neuro: No focal deficits    Current Outpatient Medications   Medication Sig Dispense Refill    acetaminophen (TYLENOL) 500 MG tablet Tylenol Extra Strength 500 mg tablet   As needed      baclofen (LIORESAL) 20 MG tablet Take 1 tablet by mouth 4 (Four) Times a Day.      buPROPion XL (WELLBUTRIN XL) 300 MG 24 hr tablet Take 1 tablet by mouth Daily. 90 tablet 3    Cholecalciferol (VITAMIN D) 2000 units capsule Take  by mouth.      folic acid (FOLVITE) 1 MG tablet Take 2 tablets by mouth Daily.      gabapentin (NEURONTIN) 100 MG capsule Take 3 capsules by mouth Every Night.      Ibuprofen 200 MG capsule Take  by mouth.      lansoprazole (PREVACID) 30 MG capsule   0    methotrexate 2.5 MG tablet Take 6 tablets by mouth 1 (One) Time Per Week.      potassium chloride 10 MEQ CR tablet Take  by mouth.      rizatriptan (MAXALT) 10 MG tablet Take 1 tablet by mouth 1 (One) Time As Needed for Migraine. May repeat in 2 hours if needed 9 tablet 4    Ryaltris 665-25 MCG/ACT  suspension INSTILL 1 SPRAY INTO AFFECTED NOSTRIL(S) 3 TIMES A DAY      traMADol (ULTRAM) 50 MG tablet Take  by mouth 3 (Three) Times a Day.      traZODone (DESYREL) 50 MG tablet Take 1-2 tablets by mouth every night at bedtime. 180 tablet 1     No current facility-administered medications for this visit.        A/P:  Diagnoses and all orders for this visit:    1. Primary insomnia (Primary)  Controlled, cont Trazodone  -     traZODone (DESYREL) 50 MG tablet; Take 1-2 tablets by mouth every night at bedtime.  Dispense: 180 tablet; Refill: 1    2. Depression with anxiety  Controlled, cont Wellbutrin   -     buPROPion XL (WELLBUTRIN XL) 300 MG 24 hr tablet; Take 1 tablet by mouth Daily.  Dispense: 90 tablet; Refill: 3    3. Menstrual migraine without status migrainosus, not intractable  Controlled  -     rizatriptan (MAXALT) 10 MG tablet; Take 1 tablet by mouth 1 (One) Time As Needed for Migraine. May repeat in 2 hours if needed  Dispense: 9 tablet; Refill: 4    4. Screening for lipid disorders  -     Lipid Panel; Future    5. Impaired concentration  Referral to Behavioral Health for ADD screening       Return in about 3 months (around 9/20/2024) for physical/annual.     Dictated Utilizing Dragon Dictation    Please note that portions of this note were completed with a voice recognition program.    Part of this note may be an electronic transcription/translation of spoken language to printed text using the Dragon Dictation System.

## 2024-07-08 ENCOUNTER — OFFICE VISIT (OUTPATIENT)
Age: 54
End: 2024-07-08
Payer: COMMERCIAL

## 2024-07-08 VITALS — OXYGEN SATURATION: 99 % | BODY MASS INDEX: 27.16 KG/M2 | HEART RATE: 85 BPM | WEIGHT: 163 LBS | HEIGHT: 65 IN

## 2024-07-08 DIAGNOSIS — F41.1 GENERALIZED ANXIETY DISORDER: ICD-10-CM

## 2024-07-08 DIAGNOSIS — F33.1 MAJOR DEPRESSIVE DISORDER, RECURRENT EPISODE, MODERATE: ICD-10-CM

## 2024-07-08 DIAGNOSIS — F90.0 ATTENTION DEFICIT HYPERACTIVITY DISORDER (ADHD), INATTENTIVE TYPE, MILD: Primary | ICD-10-CM

## 2024-07-08 PROCEDURE — 90792 PSYCH DIAG EVAL W/MED SRVCS: CPT

## 2024-07-08 RX ORDER — ATOMOXETINE 40 MG/1
40 CAPSULE ORAL DAILY
Qty: 30 CAPSULE | Refills: 2 | Status: SHIPPED | OUTPATIENT
Start: 2024-07-08 | End: 2025-07-08

## 2024-07-08 RX ORDER — BUPROPION HYDROCHLORIDE 150 MG/1
150 TABLET ORAL EVERY MORNING
Qty: 30 TABLET | Refills: 2 | Status: SHIPPED | OUTPATIENT
Start: 2024-07-08 | End: 2025-07-08

## 2024-07-10 ENCOUNTER — TELEPHONE (OUTPATIENT)
Dept: INTERNAL MEDICINE | Facility: CLINIC | Age: 54
End: 2024-07-10
Payer: COMMERCIAL

## 2024-07-10 NOTE — TELEPHONE ENCOUNTER
PATIENT CALLED ABOUT THE MEDICATION THAT WAS PRESCRIBED FOR HER WHEN SHE WAS HERE ON MONDAY. SHE SAID ITS MAKING HER WORSE AND WOULD LIKE FOR YOU TO CALL HER BACK.

## 2024-07-11 NOTE — TELEPHONE ENCOUNTER
Pt reports brain fog, she verbalized understanding to discontinue medication. Patient is coming on 07/16 to discuss other medication options.

## 2024-07-16 ENCOUNTER — OFFICE VISIT (OUTPATIENT)
Age: 54
End: 2024-07-16
Payer: COMMERCIAL

## 2024-07-16 ENCOUNTER — TELEPHONE (OUTPATIENT)
Age: 54
End: 2024-07-16

## 2024-07-16 VITALS
WEIGHT: 162 LBS | OXYGEN SATURATION: 96 % | HEART RATE: 84 BPM | DIASTOLIC BLOOD PRESSURE: 62 MMHG | SYSTOLIC BLOOD PRESSURE: 100 MMHG | HEIGHT: 65 IN | BODY MASS INDEX: 26.99 KG/M2

## 2024-07-16 DIAGNOSIS — F90.0 ATTENTION DEFICIT HYPERACTIVITY DISORDER (ADHD), INATTENTIVE TYPE, MILD: ICD-10-CM

## 2024-07-16 DIAGNOSIS — F41.1 GENERALIZED ANXIETY DISORDER: Primary | ICD-10-CM

## 2024-07-16 DIAGNOSIS — F33.1 MAJOR DEPRESSIVE DISORDER, RECURRENT EPISODE, MODERATE: ICD-10-CM

## 2024-07-16 RX ORDER — DEXTROAMPHETAMINE SACCHARATE, AMPHETAMINE ASPARTATE, DEXTROAMPHETAMINE SULFATE AND AMPHETAMINE SULFATE 1.25; 1.25; 1.25; 1.25 MG/1; MG/1; MG/1; MG/1
5 TABLET ORAL 2 TIMES DAILY
Qty: 120 TABLET | Refills: 0 | Status: SHIPPED | OUTPATIENT
Start: 2024-07-16 | End: 2024-07-17

## 2024-07-16 NOTE — TELEPHONE ENCOUNTER
PT IS CALLING STATING THE PHARMACY IS ONLY ALLOWED TO FILL HER ADDERALL FOR 30 DAYS AT A TIME. SHE HAS A RX THAT HAS A QUANTITY  AND TO TAKE 2 DAILY.

## 2024-07-16 NOTE — PROGRESS NOTES
"     Follow Up Office Visit      Patient Name: Anel Perkins  : 1970   MRN: [id number]     Referring Provider: Rema Grider DO    Chief Complaint:      ICD-10-CM ICD-9-CM   1. Generalized anxiety disorder  F41.1 300.02   2. Major depressive disorder, recurrent episode, moderate  F33.1 296.32   3. Attention deficit hyperactivity disorder (ADHD), inattentive type, mild  F90.0 314.01        History of Present Illness:   Anel Perikns is a 53 y.o. female who is here today for follow up and medication management.    Subjective      Patient Reports:   Patient was seen on 24 for an initial evaluation.  Patient was prescribed Strattera 40 mg p.o. daily for ADHD symptoms and Wellbutrin was increased from 300 mg to 450 mg during previous visit. Patient reports starting the Strattera and experiencing negative side effects that exacerbated her ADHD symptoms. Patient reports experiencing increase difficulty with focusing and thinking.  Patient states \"It was like I had difficulty putting 2 sentences together\".  Patient reports side effects negatively impacted her work, as she often found herself just staring at a monitor. Patient reports starting the increase dose of Wellbutrin. Patient reports she has not noticed a change yet, but will continue to monitor.    Patient reports symptoms of anxiety and depression are the same. Patient denies panic attacks. Patient denies mood swings, anger outburst and lashing out. Patient rates depression. Patient denies SI/HI/AVH.    PHQ-9=  assessed 24  SUKI-7= assessed 24    Review of Systems:   Review of Systems   Constitutional:  Negative for appetite change, fatigue and unexpected weight change.   Eyes:  Negative for visual disturbance.   Respiratory:  Negative for chest tightness and shortness of breath.    Cardiovascular:  Negative for chest pain.   Musculoskeletal:  Negative for gait problem.   Skin:  Negative for rash and wound.   Neurological:  Negative " for dizziness, tremors, seizures, weakness, light-headedness and headaches.   Psychiatric/Behavioral:  Positive for decreased concentration, dysphoric mood and sleep disturbance. Negative for agitation, behavioral problems, confusion, hallucinations, self-injury and suicidal ideas. The patient is nervous/anxious. The patient is not hyperactive.    Sleep pattern: 8-9 hours  Appetite: normal     PHQ-9 Depression Screening  Little interest or pleasure in doing things?     Feeling down, depressed, or hopeless?     Trouble falling or staying asleep, or sleeping too much?     Feeling tired or having little energy?     Poor appetite or overeating?     Feeling bad about yourself - or that you are a failure or have let yourself or your family down?     Trouble concentrating on things, such as reading the newspaper or watching television?     Moving or speaking so slowly that other people could have noticed? Or the opposite - being so fidgety or restless that you have been moving around a lot more than usual?     Thoughts that you would be better off dead, or of hurting yourself in some way?     PHQ-9 Total Score     If you checked off any problems, how difficult have these problems made it for you to do your work, take care of things at home, or get along with other people?       SUKI-7 Anxiety Screening       RISK ASSESSMENT:  Patient denies any thoughts or intent of suicide today. Patient denies any impulsive behavior today.     Medications:     Current Outpatient Medications:     acetaminophen (TYLENOL) 500 MG tablet, Tylenol Extra Strength 500 mg tablet  As needed, Disp: , Rfl:     baclofen (LIORESAL) 20 MG tablet, Take 1 tablet by mouth 4 (Four) Times a Day., Disp: , Rfl:     buPROPion XL (Wellbutrin XL) 150 MG 24 hr tablet, Take 1 tablet by mouth Every Morning. Indications: Major Depressive Disorder, take in addition to 300mg for a total 450mg PO qd, Disp: 30 tablet, Rfl: 2    buPROPion XL (WELLBUTRIN XL) 300 MG 24 hr  "tablet, Take 1 tablet by mouth Daily., Disp: 90 tablet, Rfl: 3    Cholecalciferol (VITAMIN D) 2000 units capsule, Take  by mouth., Disp: , Rfl:     folic acid (FOLVITE) 1 MG tablet, Take 2 tablets by mouth Daily., Disp: , Rfl:     gabapentin (NEURONTIN) 100 MG capsule, Take 3 capsules by mouth Every Night., Disp: , Rfl:     Ibuprofen 200 MG capsule, Take  by mouth., Disp: , Rfl:     lansoprazole (PREVACID) 30 MG capsule, , Disp: , Rfl: 0    methotrexate 2.5 MG tablet, Take 6 tablets by mouth 1 (One) Time Per Week., Disp: , Rfl:     potassium chloride 10 MEQ CR tablet, Take  by mouth., Disp: , Rfl:     rizatriptan (MAXALT) 10 MG tablet, Take 1 tablet by mouth 1 (One) Time As Needed for Migraine. May repeat in 2 hours if needed, Disp: 9 tablet, Rfl: 4    Ryaltris 665-25 MCG/ACT suspension, INSTILL 1 SPRAY INTO AFFECTED NOSTRIL(S) 3 TIMES A DAY, Disp: , Rfl:     traMADol (ULTRAM) 50 MG tablet, Take  by mouth 3 (Three) Times a Day., Disp: , Rfl:     traZODone (DESYREL) 50 MG tablet, Take 1-2 tablets by mouth every night at bedtime., Disp: 180 tablet, Rfl: 1    amphetamine-dextroamphetamine (Adderall) 5 MG tablet, Take 1 tablet by mouth 2 (Two) Times a Day., Disp: 120 tablet, Rfl: 0    Medication Considerations:  TUCKER reviewed and appropriate.      Allergies:   Allergies   Allergen Reactions    No Known Drug Allergy        Results Reviewed:     Objective     Physical Exam:  Vital Signs:   Vitals:    07/16/24 1207 07/16/24 1209   BP:  100/62   Pulse: 84    SpO2: 96%    Weight: 73.5 kg (162 lb)    Height: 165.1 cm (65\")      Body mass index is 26.96 kg/m².     Mental Status Exam:   MENTAL STATUS EXAM   General Appearance:  Cleanly groomed and dressed and well developed  Eye Contact:  Good eye contact  Attitude:  Cooperative  Motor Activity:  Normal gait, posture and fidgety  Muscle Strength:  Normal  Speech:  Normal rate, tone, volume  Language:  Spontaneous  Mood and affect:  Normal, pleasant  Hopelessness:  " Denies  Loneliness: 6  Thought Process:  Logical  Associations/ Thought Content:  No delusions  Hallucinations:  None  Suicidal Ideations:  Not present  Homicidal Ideation:  Not present  Sensorium:  Alert and clear  Orientation:  Person, place, time and situation  Immediate Recall, Recent, and Remote Memory:  Intact  Attention Span/ Concentration:  Good  Fund of Knowledge:  Appropriate for age and educational level  Intellectual Functioning:  Average range  Insight:  Good  Judgement:  Good  Reliability:  Good  Impulse Control:  Fair       @RESULASTCBCDIFFPANEL,TSH,LABLIPI,[id number],AXMXGNHT79,MG,FOLATE,PROLACTIN,CRPRESULT,CMP,[id number])@    Lab Results   Component Value Date    GLUCOSE 87 11/09/2022    BUN 12 11/09/2022    CREATININE 1.00 11/09/2022    EGFR 69.2 11/09/2022    BCR 12.0 11/09/2022    K 3.7 11/09/2022    CO2 29.0 11/09/2022    CALCIUM 9.5 11/09/2022    PROTENTOTREF 7.1 08/22/2019    ALBUMIN 4.20 11/09/2022    BILITOT 0.3 11/09/2022    AST 20 11/09/2022    ALT 15 11/09/2022       Lab Results   Component Value Date    WBC 8.81 11/09/2022    HGB 12.7 11/09/2022    HCT 38.9 11/09/2022    MCV 88.0 11/09/2022     (H) 11/09/2022       Lab Results   Component Value Date    CHOL 190 09/14/2020    CHLPL 204 (H) 07/12/2022    TRIG 157 (H) 07/12/2022    HDL 66 07/12/2022     (H) 07/12/2022       Assessment / Plan      Visit Diagnosis/Orders Placed This Visit:  Diagnoses and all orders for this visit:    1. Generalized anxiety disorder (Primary)    2. Major depressive disorder, recurrent episode, moderate    3. Attention deficit hyperactivity disorder (ADHD), inattentive type, mild  -     amphetamine-dextroamphetamine (Adderall) 5 MG tablet; Take 1 tablet by mouth 2 (Two) Times a Day.  Dispense: 120 tablet; Refill: 0  -     ToxAssure Flex 23, Ur -; Future       Functional Status: Mild impairment     Prognosis: Good with Ongoing Treatment     Impression/Formulation:  Patient appeared alert and  oriented.  Patient is voluntarily requesting to continue outpatient psychiatric treatment at [facility] [address]  Patient is receptive to assistance with maintaining a stable lifestyle.  Patient presents with history of     ICD-10-CM ICD-9-CM   1. Generalized anxiety disorder  F41.1 300.02   2. Major depressive disorder, recurrent episode, moderate  F33.1 296.32   3. Attention deficit hyperactivity disorder (ADHD), inattentive type, mild  F90.0 314.01   .    Reviewed patient's previous provider notes. Reviewed most recent labs. Patient meets DSM V diagnostic criteria for diagnoses. Diagnoses may be updated as more information becomes available.       Treatment Plan:   Discontinue Strattera due to negative side effects  Initiate Adderall 5 mg PO BID. Patient would like to try immediate release first due to current difficulty sleeping  UDS completed during visit  Follow up in 2 months and as needed  Provider informed patient that provider will be on leave starting July 18. Provider informed patient that someone will be covering for provider during this time and they can call the office as needed.     Patient will continue supportive psychotherapy efforts and medications as indicated. Clinic will obtain release of information for current treatment team for continuity of care as needed. Patient will contact this office, call 911 or present to the nearest emergency room should suicidal or homicidal ideations occur.  Discussed medication options and treatment plan of prescribed medication(s) as well as the risks, benefits, and potential side effects. Patient acknowledged and verbally consented to continue with current treatment plan and was educated on the importance of compliance with treatment and follow-up appointments.     Quality Measures:   Never smoker    I advised Anel Perkins of the risks of tobacco use.     Follow Up:   Return in about 2 months (around  9/16/2024).      VINH Renner  [facility] [address]

## 2024-07-17 DIAGNOSIS — F90.0 ATTENTION DEFICIT HYPERACTIVITY DISORDER (ADHD), INATTENTIVE TYPE, MILD: ICD-10-CM

## 2024-07-17 RX ORDER — DEXTROAMPHETAMINE SACCHARATE, AMPHETAMINE ASPARTATE, DEXTROAMPHETAMINE SULFATE AND AMPHETAMINE SULFATE 1.25; 1.25; 1.25; 1.25 MG/1; MG/1; MG/1; MG/1
5 TABLET ORAL 2 TIMES DAILY
Qty: 60 TABLET | Refills: 0 | Status: SHIPPED | OUTPATIENT
Start: 2024-07-17 | End: 2024-08-16

## 2024-07-24 ENCOUNTER — PATIENT ROUNDING (BHMG ONLY) (OUTPATIENT)
Dept: URGENT CARE | Facility: CLINIC | Age: 54
End: 2024-07-24
Payer: COMMERCIAL

## 2024-07-24 NOTE — ED NOTES
Thank you for letting us care for you in your recent visit to our urgent care center. We would love to hear about your experience with us. Was this the first time you have visited our location?    We’re always looking for ways to make our patients’ experiences even better. Do you have any recommendations on ways we may improve?     I appreciate you taking the time to respond. Please be on the lookout for a survey about your recent visit from [employer] via text or email. We would greatly appreciate if you could fill that out and turn it back in. We want your voice to be heard and we value your feedback.   Thank you for choosing [facility] for your healthcare needs.

## 2024-08-09 ENCOUNTER — TELEPHONE (OUTPATIENT)
Age: 54
End: 2024-08-09
Payer: COMMERCIAL

## 2024-08-12 ENCOUNTER — OFFICE VISIT (OUTPATIENT)
Age: 54
End: 2024-08-12
Payer: COMMERCIAL

## 2024-08-12 VITALS
WEIGHT: 162.9 LBS | SYSTOLIC BLOOD PRESSURE: 126 MMHG | HEART RATE: 75 BPM | DIASTOLIC BLOOD PRESSURE: 70 MMHG | BODY MASS INDEX: 27.14 KG/M2 | TEMPERATURE: 97.6 F | HEIGHT: 65 IN

## 2024-08-12 DIAGNOSIS — K11.7 XEROSTOMIA: ICD-10-CM

## 2024-08-12 DIAGNOSIS — M79.7 FIBROMYALGIA: ICD-10-CM

## 2024-08-12 DIAGNOSIS — R52 PAIN MANAGEMENT: ICD-10-CM

## 2024-08-12 DIAGNOSIS — H04.123 BILATERAL DRY EYES: ICD-10-CM

## 2024-08-12 DIAGNOSIS — Z79.899 OTHER LONG TERM (CURRENT) DRUG THERAPY: ICD-10-CM

## 2024-08-12 DIAGNOSIS — M70.61 TROCHANTERIC BURSITIS OF RIGHT HIP: ICD-10-CM

## 2024-08-12 DIAGNOSIS — M85.89 OSTEOPENIA OF MULTIPLE SITES: ICD-10-CM

## 2024-08-12 DIAGNOSIS — M32.9 SLE (SYSTEMIC LUPUS ERYTHEMATOSUS RELATED SYNDROME): Primary | ICD-10-CM

## 2024-08-12 DIAGNOSIS — R53.83 OTHER FATIGUE: ICD-10-CM

## 2024-08-12 RX ORDER — FOLIC ACID 1 MG/1
2000 TABLET ORAL DAILY
Qty: 60 TABLET | Refills: 4 | Status: SHIPPED | OUTPATIENT
Start: 2024-08-12 | End: 2025-01-15 | Stop reason: SDUPTHER

## 2024-08-12 RX ORDER — PREDNISONE 1 MG/1
2 TABLET ORAL DAILY
Qty: 60 TABLET | Refills: 4 | Status: SHIPPED | OUTPATIENT
Start: 2024-08-12 | End: 2025-01-15 | Stop reason: SDUPTHER

## 2024-08-12 RX ORDER — TRAMADOL HYDROCHLORIDE 50 MG/1
50 TABLET ORAL 3 TIMES DAILY
Qty: 90 TABLET | Refills: 3 | Status: SHIPPED | OUTPATIENT
Start: 2024-08-12 | End: 2024-10-01 | Stop reason: SDUPTHER

## 2024-08-12 RX ORDER — METHOTREXATE 2.5 MG/1
15 TABLET ORAL WEEKLY
Qty: 24 TABLET | Refills: 4 | Status: SHIPPED | OUTPATIENT
Start: 2024-08-12 | End: 2025-01-15 | Stop reason: SDUPTHER

## 2024-08-12 RX ORDER — GABAPENTIN 100 MG/1
CAPSULE ORAL
Qty: 150 CAPSULE | Refills: 4 | Status: SHIPPED | OUTPATIENT
Start: 2024-08-12 | End: 2025-01-15 | Stop reason: SDUPTHER

## 2024-08-12 RX ORDER — BACLOFEN 20 MG/1
20 TABLET ORAL 4 TIMES DAILY
Qty: 120 TABLET | Refills: 4 | Status: SHIPPED | OUTPATIENT
Start: 2024-08-12 | End: 2025-01-15 | Stop reason: SDUPTHER

## 2024-08-12 NOTE — PROGRESS NOTES
[facility] Follow Up Visit     Office Follow Up      Date: 08/12/2024   Patient Name: Anel Perkins  MRN: [id number]  YOB: 1970    Referring Physician: No ref. provider found     Chief Complaint   Patient presents with    Lupus       History of Present Illness: Anel Perkins is a 53 y.o. female who is here today for follow up on   History of Present Illness  The patient presents for evaluation of multiple medical concerns.    Two weeks ago, her dog tripped her, causing her to fall backwards and sustain a large bone contusion on her pelvis and lower vertebra. Despite initial concerns of a fracture, an x-ray conducted on Friday showed no fractures but did show a bruise. Her mobility is limited due to swelling, and her pain score today is 4 out of 10, which she believes is muscular in nature. She also reports a constant burning sensation beneath her skin, along with morning stiffness lasting 2.5 hours. She has reduced her ibuprofen intake to two tablets every other day or daily. Attempts to reduce her methotrexate to four tablets and wean off prednisone were unsuccessful. She experienced flare-ups in the middle of the week when reducing her methotrexate to four tablets. She returned to 6 mg of prednisone, but the relief was short-lived, leading her to resume 1 mg of prednisone. She experiences pain and swelling in her PIP joints and significant morning stiffness in her hips, which she attributes to bursitis. An x-ray of her hips revealed degeneration and arthritis. She has a history of a broken tailbone.    In terms of her lupus, she has not experienced any severe rashes in the sun, nose or mouth ulcers, or lung pain with breathing. She maintains good hydration.    She suffers from dry eyes and uses over-the-counter eye drops. She is due for an eye exam. She denies any Raynaud's phenomenon.    She suffers from fibromyalgia, which causes a burning  sensation beneath her skin, which she believes is causing muscle tightness. She exercises to strengthen her hips, particularly from physical therapy for bursitis. She takes baclofen for muscle tightness and gabapentin 2 to 3 tablets at bedtime, which aids her sleep. She also takes trazodone to maintain sleep.    She suffers from osteopenia.       Result Review :        Results  Laboratory Studies  Vitamin D was 74.2. B12 was normal. Folic acid was normal. Urine drug screen is up to date. Thyroid studies were normal. Total cholesterol was 212, normal is 199. Triglycerides were normal. Kidney function improved to 1.02, GFR was 67. CBC was normal. Liver functions were normal.    Imaging  X-ray of the sacrum and coccyx from August 9 showed no fractures or bone lesions, but mild degenerative changes in the SI joints.          Subjective     Allergies   Allergen Reactions    No Known Drug Allergy          Current Outpatient Medications:     acetaminophen (TYLENOL) 500 MG tablet, Tylenol Extra Strength 500 mg tablet  As needed, Disp: , Rfl:     acetaminophen (TYLENOL) 500 MG tablet, Take 1 tablet by mouth Every 6 (Six) Hours As Needed., Disp: , Rfl:     amphetamine-dextroamphetamine (Adderall) 5 MG tablet, Take 1 tablet by mouth 2 (Two) Times a Day for 30 days., Disp: 60 tablet, Rfl: 0    baclofen (LIORESAL) 20 MG tablet, Take 1 tablet by mouth 4 (Four) Times a Day., Disp: 120 tablet, Rfl: 4    buPROPion XL (Wellbutrin XL) 150 MG 24 hr tablet, Take 1 tablet by mouth Every Morning. Indications: Major Depressive Disorder, take in addition to 300mg for a total 450mg PO qd, Disp: 30 tablet, Rfl: 2    buPROPion XL (WELLBUTRIN XL) 300 MG 24 hr tablet, Take 1 tablet by mouth Daily., Disp: 90 tablet, Rfl: 3    Cholecalciferol (VITAMIN D) 2000 units capsule, Take  by mouth., Disp: , Rfl:     folic acid (FOLVITE) 1 MG tablet, Take 2 tablets by mouth Daily., Disp: 60 tablet, Rfl: 4    gabapentin (NEURONTIN) 100 MG capsule, 1 in am  and 1 in afternoon, and 2-3 at bedtime, Disp: 150 capsule, Rfl: 4    Ibuprofen 200 MG capsule, Take  by mouth., Disp: , Rfl:     lansoprazole (PREVACID) 30 MG capsule, , Disp: , Rfl: 0    methotrexate 2.5 MG tablet, Take 6 tablets by mouth 1 (One) Time Per Week., Disp: 24 tablet, Rfl: 4    multivitamin (MULTI VITAMIN DAILY PO), Take 1 tablet by mouth Daily., Disp: , Rfl:     Ryaltris 665-25 MCG/ACT suspension, INSTILL 1 SPRAY INTO AFFECTED NOSTRIL(S) 3 TIMES A DAY, Disp: , Rfl:     traMADol (ULTRAM) 50 MG tablet, Take 1 tablet by mouth 3 (Three) Times a Day., Disp: 90 tablet, Rfl: 3    traZODone (DESYREL) 50 MG tablet, Take 1-2 tablets by mouth every night at bedtime., Disp: 180 tablet, Rfl: 1    phenylephrine (MYDFRIN) 2.5 % ophthalmic solution, Administer 1 drop to both eyes As Needed. (Patient not taking: Reported on 8/12/2024), Disp: , Rfl:     predniSONE (DELTASONE) 1 MG tablet, Take 2 tablets by mouth Daily., Disp: 60 tablet, Rfl: 4    Past Medical History:   Diagnosis Date    Anxiety     Arthritis     DDD    Cholelithiasis 2005    Depression     Dry eyes     Dyspepsia     Elbow pain     Erythematous disorder     Fibromyalgia     Fibromyalgia, primary 2004    GERD (gastroesophageal reflux disease) 2007    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     Hip pain     History of medical problems lupus    IBS (irritable bowel syndrome)     Kidney stone 2022    Low back pain 1989    Lupus     Migraine headache     Positive EKATERINA (antinuclear antibody)     Tendinitis     Tuberculosis 1992    Urinary tract infection         Past Surgical History:   Procedure Laterality Date    CHOLECYSTECTOMY      DIAGNOSTIC LAPAROSCOPY      ENDOMETRIAL ABLATION      EYE SURGERY      KIDNEY SURGERY      LASIK      MANDIBLE SURGERY      and Reset    TUBAL ABDOMINAL LIGATION         Family History   Problem Relation Age of Onset    Multiple sclerosis Mother     Arthritis Mother         lupus/fibromyalgia/ms    Depression Mother   "   Heart attack Father     Arthritis Sister         RA/lupus    Depression Sister     Depression Daughter     Obesity Daughter     No Known Problems Son         Social History     Socioeconomic History    Marital status:    Tobacco Use    Smoking status: Never     Passive exposure: Never    Smokeless tobacco: Never   Vaping Use    Vaping status: Never Used   Substance and Sexual Activity    Alcohol use: No    Drug use: No    Sexual activity: Yes     Partners: Male     Birth control/protection: Tubal ligation       Review of Systems     I have reviewed and updated the patient's chief complaint, history of present illness, review of systems, past medical history, surgical history, family history, social history, medications and allergy list as appropriate.     Objective    Vital Signs:   Vitals:    08/12/24 0928   BP: 126/70   BP Location: Left arm   Pulse: 75   Temp: 97.6 °F (36.4 °C)   Weight: 73.9 kg (162 lb 14.4 oz)   Height: 165.1 cm (65\")   PainSc:   4   PainLoc: Generalized     Anel Dax Perkins reports a pain score of 4.  Given her pain assessment as noted, treatment options were discussed and the following options were decided upon as a follow-up plan to address the patient's pain: .      Body mass index is 27.11 kg/m².         Physical Exam   Physical Exam  Patient is an alert female, in no acute distress.  Head is atraumatic and normocephalic. Pupils are round and equal. Extraocular muscles appear intact. Oropharynx appears negative.  Lungs are clear.  Heart rhythm is regular without rubs, gallops, or murmurs.  Cervical spine range of motion is slightly decreased. Thoracic and lumbar spine are nontender. SI joints are tender. Fibro points are 2 out of 18, significantly tender. Shoulders are nontender with good range of motion. PIPs are tender. Ankles and MTPs in the knees appear to be nontender. Significant thickening of the first MTP bilaterally with slight hallux deformity.  Skin appears negative " for any acute rashes.    Physical Exam  There is currently no information documented on the homunculus. Go to the Rheumatology activity and complete the homunculus joint exam.     Results Review:   Imaging Results (Last 24 Hours)       ** No results found for the last 24 hours. **            Procedures    Assessment / Plan    Assessment/Plan:   Diagnoses and all orders for this visit:    1. SLE (systemic lupus erythematosus related syndrome) (Primary)  Assessment & Plan:   Mild-positive EKATERINA, joint pain, rash at beginning.  Stopped methotrexate 9/15 herself.  She was on Plaquenil prior to methotrexate.  Taking EHT Supplement.  Lupus labs negative 6/16.  Uses ibuprofen as needed.  MTX restart 3/2023 for recurrent flares ( joint pain and swelling) LE pain.  8/23 Complements normal, DNA negative.   Overall doing well.     Plan:  Nioxin shampoo. Biotin vitamins for hair thinning.  Decrease mtx to 5 tablets weekly for 2 months, then decrease to 4 tablets weekly and stay there until next visit.   Continue folic acid every day except mtx day.  Currently on prednisone 2mg daily. Taper by 1mg per month.    Tylenol Arthritis 2 tabs twice daily - [employer] generic or BRAND - for joint pain.      Orders:  -     CBC & Differential; Standing  -     Comprehensive Metabolic Panel; Standing  -     dsDNA Antibody by IFA, Crithidia luciliae, with Reflex to Titer; Future  -     C4+C3; Future  -     Urinalysis With Microscopic - Urine, Clean Catch; Future  -     gabapentin (NEURONTIN) 100 MG capsule; 1 in am and 1 in afternoon, and 2-3 at bedtime  Dispense: 150 capsule; Refill: 4  -     traMADol (ULTRAM) 50 MG tablet; Take 1 tablet by mouth 3 (Three) Times a Day.  Dispense: 90 tablet; Refill: 3    2. Fibromyalgia  Assessment & Plan:  Chronic diffuse muscle pain with intermittent sx.  On Baclofen 20mg QID.  No History of seizure disorder.  Uses Tramadol prn.  Upper back flares periodically.  Uses Biofreeze and Occ massage.    waxes and  titus- seems to be flaring more recently    Plan:  Continue Gabapentin, Baclofen, and Tramadol.     Four Cornerstones of treatment include  1- Treat Sleep- restoration.  2- Exercise- chair aerobics or chair yoga; Water aerobics. Restart these. She has been doing PT exercises.  3- Treat anxiety and depression  4- Treat pain. Non addictive muscle relaxer. Biofreeze, Massage , Heat , ICE.  FMS packet given and discussed for her to work with pcp regarding treatment.      Orders:  -     gabapentin (NEURONTIN) 100 MG capsule; 1 in am and 1 in afternoon, and 2-3 at bedtime  Dispense: 150 capsule; Refill: 4  -     traMADol (ULTRAM) 50 MG tablet; Take 1 tablet by mouth 3 (Three) Times a Day.  Dispense: 90 tablet; Refill: 3    3. Trochanteric bursitis of right hip  Assessment & Plan:  Tender over ischium and trochanteric bursae on exam.  + anterolateral pain with flexion that could be concerning for hip joint.  No relief with R injection. Pain worsened after.    Plan:    Can try salon pas lidocaine patches - no relief  Uses Voltaren and biofreeze every night.  PT for iontophoresis - helping.   If this does not improve, we may need ortho evaluation. She will let us know when she needs this.       4. Bilateral dry eyes  Assessment & Plan:  5/17 visit to her Eye Doctor with abnormal Schirmers test.  Ssa/Ssb Negative 2/2023  Intermittent    Plan:  OTC drops          5. Xerostomia  Assessment & Plan:  Sx  Fluctuates  Ssa/Ssb Negative.  Per patient medication not needed currently.  Doing well currently- unchanged    Plan:    Biotene tooth paste.  Oasis spray discussed - [employer]. She has not tried these  Can try Xylimelts- would try those for night time dryness. She has not tried these.   Salagen/Evoxac discussed in the past- Option if patient wants to try after she tries [place] spray or Xylimelts.   Gold standard for diagnosis of Sjogren's is a lip biopsy.      6. Osteopenia of multiple sites  Assessment & Plan:  DEXA scan 3/23- No  compression fractures seen on VFA, Total L hip 0, L femoral neck -0.9, R femoral neck -1.1, R total hip 0.8, spine -0.3.    Plan:  1000-2000IU of vitamin D 3 gel caps per day. I would like her to check the dose on this and let us know.   800-1000MG of Calcium citrate per day.  Weight bearing exercise.  Calcium food list given.      7. Other fatigue  Assessment & Plan:  Recent D and B12 normal.      8. Pain management  Assessment & Plan:  Gabapentin and Tramadol.  Contract signed on chart.      Plan:  UDS 2/23      9. Other long term (current) drug therapy  Assessment & Plan:  Methotrexate- Well tolerated and effective.    Plan:    Cbc,Cmp q 2 months-  3/24 Cr 1.02/67, cholesterol 212, B12 803, D 74.2    Would hold methotrexate for any infection or illness, Seek treatment and when well and illness is resolved you may restart medication.    Hold methotrexate 1-2 weeks after Covid vaccine.     Orders:  -     CBC & Differential; Standing  -     Comprehensive Metabolic Panel; Standing  -     dsDNA Antibody by IFA, Crithidia luciliae, with Reflex to Titer; Future  -     C4+C3; Future  -     Urinalysis With Microscopic - Urine, Clean Catch; Future    Other orders  -     baclofen (LIORESAL) 20 MG tablet; Take 1 tablet by mouth 4 (Four) Times a Day.  Dispense: 120 tablet; Refill: 4  -     folic acid (FOLVITE) 1 MG tablet; Take 2 tablets by mouth Daily.  Dispense: 60 tablet; Refill: 4  -     methotrexate 2.5 MG tablet; Take 6 tablets by mouth 1 (One) Time Per Week.  Dispense: 24 tablet; Refill: 4  -     predniSONE (DELTASONE) 1 MG tablet; Take 2 tablets by mouth Daily.  Dispense: 60 tablet; Refill: 4          Assessment & Plan  1. Systemic lupus.  She is currently on 6 methotrexate tablets weekly and 1 mg of prednisone daily. If necessary, she can increase the prednisone to 2 mg daily. If effective, she can continue folic acid. Alternative options include leflunomide or Arava.    2. Fibromyalgia.  Despite the absence of  severe tender points, she is experiencing burning paresthesias under the skin. She should continue her baclofen and tramadol. An attempt will be made to adjust her gabapentin dosing to 100 mg in the morning, 100 mg in the afternoon, and 200 to 300 mg at bedtime to assess if this improves her muscle symptoms. She can use Biofreeze on her muscles. Water walking, chair aerobics, or chair yoga are recommended.    3. Trochanteric bursitis of the hip.  She has completed her physical therapy and is performing her home exercises. If her symptoms worsen or become a chronic situation, a referral to orthopedics will be considered.    4. Dry eyes.  She is currently using over-the-counter drops. She is advised to consult her eye doctor to ensure no further treatment is necessary. For xerostomia, she can use Biotene toothpaste, a waste of spray from [employer], and XyliMelts from [employer] or [employer] at bedtime. Her dry mouth has improved.    5. Osteopenia.  Her vitamin D level is normal. She will continue on vitamin D and calcium. Weightbearing exercises, if possible, are recommended. She is not due for a bone density scan until 03/2025. Pain management and urine drug screen are up to date, as of 03/2024. Long-term drug therapy, methotrexate, is overall well tolerated and effective. She is to discontinue methotrexate 1 to 2 weeks post COVID-19 vaccine for any illness or infection. Her kidney function is stable, with a GFR in the 60s, but was in the 90s over a year ago. We will continue to monitor this and adjust her medication regimen. She is advised to drink plenty of fluids and discuss this with her primary care physician. Labs are pending from last week, including crisidia DNA and C3, C4.    Follow-up  She will follow up in 4 months.        Follow Up:   Return in about 4 months (around 12/12/2024).       Francy Burleson MD  [facility]     Encounter Administratively Closed by [employer]

## 2024-08-13 ENCOUNTER — PATIENT ROUNDING (BHMG ONLY) (OUTPATIENT)
Dept: URGENT CARE | Facility: CLINIC | Age: 54
End: 2024-08-13
Payer: COMMERCIAL

## 2024-08-13 NOTE — ED NOTES
Thank you for letting us care for you in your recent visit to our urgent care center. We would love to hear about your experience with us. Was this the first time you have visited our location?    We’re always looking for ways to make our patients’ experiences even better. Do you have any recommendations on ways we may improve?     I appreciate you taking the time to respond. Please be on the lookout for a survey about your recent visit from [employer] via text or email. We would greatly appreciate if you could fill that out and turn it back in. We want your voice to be heard and we value your feedback.   Thank you for choosing [facility] for your healthcare needs.

## 2024-08-23 LAB
1OH-MIDAZOLAM UR QL SCN: NOT DETECTED NG/MG CREAT
6MAM UR QL SCN: NEGATIVE NG/ML
7AMINOCLONAZEPAM/CREAT UR: NOT DETECTED NG/MG CREAT
A-OH ALPRAZ/CREAT UR: NOT DETECTED NG/MG CREAT
A-OH-TRIAZOLAM/CREAT UR CFM: NOT DETECTED NG/MG CREAT
ACP UR QL CFM: NOT DETECTED
ALPRAZ/CREAT UR CFM: NOT DETECTED NG/MG CREAT
AMPHET UR CFM-MCNC: NOT DETECTED NG/MG CREAT
AMPHETAMINES UR QL SCN: NORMAL NG/ML
AMPHETAMINES UR QL: NEGATIVE
APAP UR QL SCN: NORMAL UG/ML
APAP UR QL: NORMAL
APAP UR-MCNC: PRESENT UG/ML
BARBITURATES UR QL SCN: NEGATIVE NG/ML
BENZODIAZ SCN METH UR: NEGATIVE
BUPRENORPHINE UR QL SCN: NEGATIVE
BUPRENORPHINE/CREAT UR: NOT DETECTED NG/MG CREAT
CANNABINOIDS UR QL SCN: NEGATIVE NG/ML
CARISOPRODOL UR QL: NEGATIVE NG/ML
CLONAZEPAM/CREAT UR CFM: NOT DETECTED NG/MG CREAT
COCAINE+BZE UR QL SCN: NEGATIVE NG/ML
CREAT UR-MCNC: 162 MG/DL
D-METHORPHAN UR-MCNC: NOT DETECTED NG/ML
D-METHORPHAN+LEVORPHANOL UR QL: NOT DETECTED
DESALKYLFLURAZ/CREAT UR: NOT DETECTED NG/MG CREAT
DIAZEPAM/CREAT UR: NOT DETECTED NG/MG CREAT
ETHANOL UR QL SCN: NEGATIVE G/DL
ETHANOL UR QL SCN: NEGATIVE NG/ML
FENTANYL CTO UR SCN-MCNC: NEGATIVE NG/ML
FENTANYL/CREAT UR: NOT DETECTED NG/MG CREAT
FLUNITRAZEPAM UR QL SCN: NOT DETECTED NG/MG CREAT
GABAPENTIN UR CFM-MCNC: PRESENT NG/ML
GABAPENTIN UR QL CFM: NORMAL
GABAPENTIN UR-MCNC: NORMAL UG/ML
HALLUCINOGENS UR: NEGATIVE
HYPNOTICS UR QL SCN: NEGATIVE
KETAMINE UR QL: NOT DETECTED
LORAZEPAM/CREAT UR: NOT DETECTED NG/MG CREAT
MDA UR CFM-MCNC: NOT DETECTED NG/MG CREAT
MDMA UR CFM-MCNC: NOT DETECTED NG/MG CREAT
MEPERIDINE UR QL SCN: NEGATIVE NG/ML
METHADONE UR QL SCN: NEGATIVE NG/ML
METHADONE+METAB UR QL SCN: NEGATIVE NG/ML
METHAMPHET UR CFM-MCNC: NOT DETECTED NG/MG CREAT
MIDAZOLAM/CREAT UR CFM: NOT DETECTED NG/MG CREAT
MISCELLANEOUS, UR: NEGATIVE
N-NORTRAMADOL/CREAT UR CFM: >3086 NG/MG CREAT
NORBUPRENORPHINE/CREAT UR: NOT DETECTED NG/MG CREAT
NORDIAZEPAM/CREAT UR: NOT DETECTED NG/MG CREAT
NORFENTANYL/CREAT UR: NOT DETECTED NG/MG CREAT
NORFLUNITRAZEPAM UR-MCNC: NOT DETECTED NG/MG CREAT
NORKETAMINE UR-MCNC: NOT DETECTED UG/ML
O-NORTRAMADOL UR CFM-MCNC: >3086 NG/MG CREAT
OPIATES UR SCN-MCNC: NEGATIVE NG/ML
OXAZEPAM/CREAT UR: NOT DETECTED NG/MG CREAT
OXYCODONE CTO UR SCN-MCNC: NEGATIVE NG/ML
PCP UR QL SCN: NEGATIVE NG/ML
PRESCRIBED MEDICATIONS: NORMAL
PROPOXYPH UR QL SCN: NEGATIVE NG/ML
TAPENTADOL CTO UR SCN-MCNC: NEGATIVE NG/ML
TEMAZEPAM/CREAT UR: NOT DETECTED NG/MG CREAT
TRAMADOL UR CFM-MCNC: >3086 NG/MG CREAT
TRAMADOL UR QL CFM: NORMAL
TRAMADOL UR QL SCN: NORMAL NG/ML
ZALEPLON UR-MCNC: NOT DETECTED NG/ML
ZOLPIDEM PHENYL-4-CARB UR QL SCN: NOT DETECTED
ZOLPIDEM UR QL SCN: NOT DETECTED
ZOPICLONE-N-OXIDE UR-MCNC: NOT DETECTED NG/ML

## 2024-09-09 ENCOUNTER — OFFICE VISIT (OUTPATIENT)
Age: 54
End: 2024-09-09
Payer: COMMERCIAL

## 2024-09-09 VITALS
BODY MASS INDEX: 26.94 KG/M2 | WEIGHT: 161.7 LBS | DIASTOLIC BLOOD PRESSURE: 78 MMHG | HEIGHT: 65 IN | SYSTOLIC BLOOD PRESSURE: 110 MMHG | HEART RATE: 61 BPM

## 2024-09-09 DIAGNOSIS — F41.1 GENERALIZED ANXIETY DISORDER: ICD-10-CM

## 2024-09-09 DIAGNOSIS — F33.1 MAJOR DEPRESSIVE DISORDER, RECURRENT EPISODE, MODERATE: ICD-10-CM

## 2024-09-09 DIAGNOSIS — F90.0 ATTENTION DEFICIT HYPERACTIVITY DISORDER (ADHD), INATTENTIVE TYPE, MILD: Primary | ICD-10-CM

## 2024-09-09 PROCEDURE — 99214 OFFICE O/P EST MOD 30 MIN: CPT

## 2024-09-09 RX ORDER — BUPROPION HYDROCHLORIDE 150 MG/1
150 TABLET ORAL EVERY MORNING
Qty: 30 TABLET | Refills: 2 | Status: SHIPPED | OUTPATIENT
Start: 2024-09-09 | End: 2025-09-09

## 2024-09-09 RX ORDER — RIZATRIPTAN BENZOATE 10 MG/1
10 TABLET ORAL ONCE AS NEEDED
COMMUNITY
Start: 2024-08-22

## 2024-09-09 RX ORDER — DEXTROAMPHETAMINE SACCHARATE, AMPHETAMINE ASPARTATE MONOHYDRATE, DEXTROAMPHETAMINE SULFATE AND AMPHETAMINE SULFATE 5; 5; 5; 5 MG/1; MG/1; MG/1; MG/1
20 CAPSULE, EXTENDED RELEASE ORAL DAILY
Qty: 30 CAPSULE | Refills: 0 | Status: SHIPPED | OUTPATIENT
Start: 2024-09-09 | End: 2025-09-09

## 2024-09-09 RX ORDER — TRIAMCINOLONE ACETONIDE 55 UG/1
SPRAY, METERED NASAL
COMMUNITY
Start: 2024-08-20

## 2024-09-09 RX ORDER — BUPROPION HYDROCHLORIDE 300 MG/1
300 TABLET ORAL DAILY
Qty: 90 TABLET | Refills: 2 | Status: SHIPPED | OUTPATIENT
Start: 2024-09-09

## 2024-09-09 NOTE — PROGRESS NOTES
"     Follow Up Office Visit      Patient Name: Anel Perkins  : 1970   MRN: [id number]     Referring Provider: Rema Grider DO    Chief Complaint:      ICD-10-CM ICD-9-CM   1. Attention deficit hyperactivity disorder (ADHD), inattentive type, mild  F90.0 314.01   2. Generalized anxiety disorder  F41.1 300.02   3. Major depressive disorder, recurrent episode, moderate  F33.1 296.32        History of Present Illness:   Anel Perkins is a 53 y.o. female who is here today for follow up and medication management.    Answers submitted by the patient for this visit:  Other (Submitted on 2024)  Please describe your symptoms.: Unable to focus  Have you had these symptoms before?: Yes  How long have you been having these symptoms?: Greater than 2 weeks  Please list any medications you are currently taking for this condition.: Adderol  Please describe any probable cause for these symptoms. : ADHD  Primary Reason for Visit (Submitted on 2024)  What is the primary reason for your visit?: Other  Subjective      Patient Reports:   Patient reports not noticing a difference with Adderall medication managing ADHD symptoms. Patient reports continuous difficulty focusing and and maintaining attention. Patient reports symptoms are negatively affecting occupational and social functioning. Patient denies negative side effects of medication. Patient reports anxiety is about the same. Patient denies panic attacks. Patient denies mood swings, anger outburst and lashing out. Patient reports the increase in Wellbutrin has helped her \"feel happier\". Patient reports feeling better and having a more positive outlook. Patient denies SI/HI/AVH.     PHQ-9= 7  SUKI-7= 3    Review of Systems:   Review of Systems   Constitutional:  Negative for appetite change, fatigue and unexpected weight change.   Eyes:  Negative for visual disturbance.   Respiratory:  Negative for chest tightness and shortness of breath.  "   Cardiovascular:  Negative for chest pain.   Musculoskeletal:  Negative for gait problem.   Skin:  Negative for rash and wound.   Neurological:  Negative for dizziness, tremors, seizures, weakness, light-headedness and headaches.   Psychiatric/Behavioral:  Positive for decreased concentration. Negative for agitation, behavioral problems, confusion, dysphoric mood, hallucinations, self-injury, sleep disturbance and suicidal ideas. The patient is not nervous/anxious and is not hyperactive.      Sleep pattern:  9 hours   Appetite: normal     PHQ-9 Depression Screening  Little interest or pleasure in doing things? 0-->not at all   Feeling down, depressed, or hopeless? 1-->several days   Trouble falling or staying asleep, or sleeping too much? 0-->not at all   Feeling tired or having little energy? 3-->nearly every day   Poor appetite or overeating? 0-->not at all   Feeling bad about yourself - or that you are a failure or have let yourself or your family down? 0-->not at all   Trouble concentrating on things, such as reading the newspaper or watching television? 3-->nearly every day   Moving or speaking so slowly that other people could have noticed? Or the opposite - being so fidgety or restless that you have been moving around a lot more than usual? 0-->not at all   Thoughts that you would be better off dead, or of hurting yourself in some way? 0-->not at all   PHQ-9 Total Score 7   If you checked off any problems, how difficult have these problems made it for you to do your work, take care of things at home, or get along with other people?       SUKI-7 Anxiety Screening  Over the last two weeks, how often have you been bothered by the following problems?  Feeling nervous, anxious or on edge: Nearly every day  Not being able to stop or control worrying: Not at all  Worrying too much about different things: Not at all  Trouble Relaxing: Not at all  Being so restless that it is hard to sit still: Not at all  Becoming  easily annoyed or irritable: Not at all  Feeling afraid as if something awful might happen: Not at all  SUKI 7 Total Score: 3    RISK ASSESSMENT:  Patient denies any thoughts or intent of suicide today. Patient denies any impulsive behavior today.     Medications:     Current Outpatient Medications:     acetaminophen (TYLENOL) 500 MG tablet, Tylenol Extra Strength 500 mg tablet  As needed, Disp: , Rfl:     acetaminophen (TYLENOL) 500 MG tablet, Take 1 tablet by mouth Every 6 (Six) Hours As Needed., Disp: , Rfl:     baclofen (LIORESAL) 20 MG tablet, Take 1 tablet by mouth 4 (Four) Times a Day., Disp: 120 tablet, Rfl: 4    buPROPion XL (Wellbutrin XL) 150 MG 24 hr tablet, Take 1 tablet by mouth Every Morning. Indications: Major Depressive Disorder, take in addition to 300mg for a total 450mg PO qd, Disp: 30 tablet, Rfl: 2    buPROPion XL (WELLBUTRIN XL) 300 MG 24 hr tablet, Take 1 tablet by mouth Daily., Disp: 90 tablet, Rfl: 2    Cholecalciferol (VITAMIN D) 2000 units capsule, Take  by mouth., Disp: , Rfl:     folic acid (FOLVITE) 1 MG tablet, Take 2 tablets by mouth Daily., Disp: 60 tablet, Rfl: 4    gabapentin (NEURONTIN) 100 MG capsule, 1 in am and 1 in afternoon, and 2-3 at bedtime, Disp: 150 capsule, Rfl: 4    Ibuprofen 200 MG capsule, Take  by mouth., Disp: , Rfl:     lansoprazole (PREVACID) 30 MG capsule, , Disp: , Rfl: 0    methotrexate 2.5 MG tablet, Take 6 tablets by mouth 1 (One) Time Per Week., Disp: 24 tablet, Rfl: 4    multivitamin (MULTI VITAMIN DAILY PO), Take 1 tablet by mouth Daily., Disp: , Rfl:     predniSONE (DELTASONE) 1 MG tablet, Take 2 tablets by mouth Daily., Disp: 60 tablet, Rfl: 4    rizatriptan (MAXALT) 10 MG tablet, Take 1 tablet by mouth 1 (One) Time As Needed for Migraine. May repeat dose after 2 hours as needed, Disp: , Rfl:     Ryaltris 665-25 MCG/ACT suspension, INSTILL 1 SPRAY INTO AFFECTED NOSTRIL(S) 3 TIMES A DAY, Disp: , Rfl:     traMADol (ULTRAM) 50 MG tablet, Take 1 tablet by  "mouth 3 (Three) Times a Day., Disp: 90 tablet, Rfl: 3    traZODone (DESYREL) 50 MG tablet, Take 1-2 tablets by mouth every night at bedtime., Disp: 180 tablet, Rfl: 1    Triamcinolone Acetonide (NASACORT) 55 MCG/ACT nasal inhaler, SPRAY 1 SPRAY 3 TIMES A DAY BY INTRANASAL ROUTE FOR 30 DAYS., Disp: , Rfl:     amphetamine-dextroamphetamine XR (ADDERALL XR) 20 MG 24 hr capsule, Take 1 capsule by mouth Daily, Disp: 30 capsule, Rfl: 0    phenylephrine (MYDFRIN) 2.5 % ophthalmic solution, Administer 1 drop to both eyes As Needed. (Patient not taking: Reported on 8/12/2024), Disp: , Rfl:     Medication Considerations:  TUCKER reviewed and appropriate.      Allergies:   Allergies   Allergen Reactions    No Known Drug Allergy        Results Reviewed:       Objective     Physical Exam:  Vital Signs:   Vitals:    09/09/24 1208 09/09/24 1212   BP:  110/78   Pulse:  61   Weight: 73.3 kg (161 lb 11.2 oz)    Height:  165.1 cm (65\")     Body mass index is 26.91 kg/m².     Mental Status Exam:   MENTAL STATUS EXAM   General Appearance:  Cleanly groomed and dressed and well developed  Eye Contact:  Good eye contact  Attitude:  Cooperative  Motor Activity:  Normal gait, posture  Muscle Strength:  Normal  Speech:  Normal rate, tone, volume  Language:  Spontaneous  Mood and affect:  Normal, pleasant  Hopelessness:  Denies  Loneliness: Denies  Thought Process:  Logical  Associations/ Thought Content:  No delusions  Hallucinations:  None  Suicidal Ideations:  Not present  Homicidal Ideation:  Not present  Sensorium:  Alert and clear  Orientation:  Person, place, time and situation  Immediate Recall, Recent, and Remote Memory:  Intact  Attention Span/ Concentration:  Good  Fund of Knowledge:  Appropriate for age and educational level  Intellectual Functioning:  Average range  Insight:  Good  Judgement:  Good  Reliability:  Good  Impulse Control:  Good   "     @RESULASTCBCDIFFPANEL,TSH,LABLIPI,WQDDNMWT30,BKJXYFKG31,MG,FOLATE,PROLACTIN,CRPRESULT,CMP,[id number])@    Lab Results   Component Value Date    GLUCOSE 87 11/09/2022    BUN 12 11/09/2022    CREATININE 1.00 11/09/2022    EGFR 69.2 11/09/2022    BCR 12.0 11/09/2022    K 3.7 11/09/2022    CO2 29.0 11/09/2022    CALCIUM 9.5 11/09/2022    PROTENTOTREF 7.1 08/22/2019    ALBUMIN 4.20 11/09/2022    BILITOT 0.3 11/09/2022    AST 20 11/09/2022    ALT 15 11/09/2022       Lab Results   Component Value Date    WBC 8.81 11/09/2022    HGB 12.7 11/09/2022    HCT 38.9 11/09/2022    MCV 88.0 11/09/2022     (H) 11/09/2022       Lab Results   Component Value Date    CHOL 190 09/14/2020    CHLPL 204 (H) 07/12/2022    TRIG 157 (H) 07/12/2022    HDL 66 07/12/2022     (H) 07/12/2022       Assessment / Plan      Visit Diagnosis/Orders Placed This Visit:  Diagnoses and all orders for this visit:    1. Attention deficit hyperactivity disorder (ADHD), inattentive type, mild (Primary)  -     amphetamine-dextroamphetamine XR (ADDERALL XR) 20 MG 24 hr capsule; Take 1 capsule by mouth Daily  Dispense: 30 capsule; Refill: 0    2. Generalized anxiety disorder  -     buPROPion XL (WELLBUTRIN XL) 300 MG 24 hr tablet; Take 1 tablet by mouth Daily.  Dispense: 90 tablet; Refill: 2  -     buPROPion XL (Wellbutrin XL) 150 MG 24 hr tablet; Take 1 tablet by mouth Every Morning. Indications: Major Depressive Disorder, take in addition to 300mg for a total 450mg PO qd  Dispense: 30 tablet; Refill: 2    3. Major depressive disorder, recurrent episode, moderate  -     buPROPion XL (WELLBUTRIN XL) 300 MG 24 hr tablet; Take 1 tablet by mouth Daily.  Dispense: 90 tablet; Refill: 2  -     buPROPion XL (Wellbutrin XL) 150 MG 24 hr tablet; Take 1 tablet by mouth Every Morning. Indications: Major Depressive Disorder, take in addition to 300mg for a total 450mg PO qd  Dispense: 30 tablet; Refill: 2         Functional Status: Mild impairment      Prognosis: Good with Ongoing Treatment     Impression/Formulation:  Patient appeared alert and oriented.  Patient is voluntarily requesting to continue outpatient psychiatric treatment at [facility] [address].  Patient is receptive to assistance with maintaining a stable lifestyle.  Patient presents with history of     ICD-10-CM ICD-9-CM   1. Attention deficit hyperactivity disorder (ADHD), inattentive type, mild  F90.0 314.01   2. Generalized anxiety disorder  F41.1 300.02   3. Major depressive disorder, recurrent episode, moderate  F33.1 296.32   .    Reviewed patient's previous provider notes. Reviewed most recent labs. Patient meets DSM V diagnostic criteria for diagnoses. Diagnoses may be updated as more information becomes available.       Treatment Plan:   Switch to Adderall to XR. Initiate Adderall XR 20mg PO QAM  Continue Wellbutrin 450mg PO qam  Follow 1 month and as needed.     Patient will continue supportive psychotherapy efforts and medications as indicated. Clinic will obtain release of information for current treatment team for continuity of care as needed. Patient will contact this office, call 911 or present to the nearest emergency room should suicidal or homicidal ideations occur.  Discussed medication options and treatment plan of prescribed medication(s) as well as the risks, benefits, and potential side effects. Patient acknowledged and verbally consented to continue with current treatment plan and was educated on the importance of compliance with treatment and follow-up appointments.     Quality Measures:   Never smoker    I advised Anel Perkins of the risks of tobacco use.     Follow Up:   Return in about 1 month (around 10/9/2024).      VINH Renenr  [facility] [address]

## 2024-09-25 ENCOUNTER — OFFICE VISIT (OUTPATIENT)
Dept: FAMILY MEDICINE CLINIC | Facility: CLINIC | Age: 54
End: 2024-09-25
Payer: COMMERCIAL

## 2024-09-25 ENCOUNTER — LAB (OUTPATIENT)
Dept: LAB | Facility: HOSPITAL | Age: 54
End: 2024-09-25
Payer: COMMERCIAL

## 2024-09-25 VITALS
WEIGHT: 161 LBS | SYSTOLIC BLOOD PRESSURE: 106 MMHG | DIASTOLIC BLOOD PRESSURE: 58 MMHG | OXYGEN SATURATION: 100 % | TEMPERATURE: 96.9 F | RESPIRATION RATE: 18 BRPM | HEART RATE: 88 BPM | HEIGHT: 64 IN | BODY MASS INDEX: 27.49 KG/M2

## 2024-09-25 DIAGNOSIS — F90.0 ATTENTION DEFICIT HYPERACTIVITY DISORDER (ADHD), INATTENTIVE TYPE, MILD: ICD-10-CM

## 2024-09-25 DIAGNOSIS — Z12.4 SCREENING FOR MALIGNANT NEOPLASM OF CERVIX: ICD-10-CM

## 2024-09-25 DIAGNOSIS — Z00.00 ANNUAL PHYSICAL EXAM: Primary | ICD-10-CM

## 2024-09-25 DIAGNOSIS — F41.8 DEPRESSION WITH ANXIETY: ICD-10-CM

## 2024-09-25 DIAGNOSIS — Z12.31 ENCOUNTER FOR SCREENING MAMMOGRAM FOR MALIGNANT NEOPLASM OF BREAST: ICD-10-CM

## 2024-09-25 DIAGNOSIS — M32.9 SLE (SYSTEMIC LUPUS ERYTHEMATOSUS RELATED SYNDROME): ICD-10-CM

## 2024-09-25 DIAGNOSIS — F98.8 ATTENTION DEFICIT DISORDER, UNSPECIFIED HYPERACTIVITY PRESENCE: ICD-10-CM

## 2024-09-25 DIAGNOSIS — F51.01 PRIMARY INSOMNIA: ICD-10-CM

## 2024-09-25 PROCEDURE — 99396 PREV VISIT EST AGE 40-64: CPT | Performed by: STUDENT IN AN ORGANIZED HEALTH CARE EDUCATION/TRAINING PROGRAM

## 2024-09-25 PROCEDURE — 80307 DRUG TEST PRSMV CHEM ANLYZR: CPT

## 2024-09-25 PROCEDURE — G0480 DRUG TEST DEF 1-7 CLASSES: HCPCS

## 2024-09-25 RX ORDER — BUPROPION HYDROCHLORIDE 450 MG/1
450 TABLET, FILM COATED, EXTENDED RELEASE ORAL DAILY
Qty: 90 TABLET | Refills: 1 | Status: SHIPPED | OUTPATIENT
Start: 2024-09-25

## 2024-09-26 PROBLEM — Z00.00 ANNUAL PHYSICAL EXAM: Status: ACTIVE | Noted: 2024-09-26

## 2024-09-26 PROBLEM — F98.8 ATTENTION DEFICIT DISORDER: Status: ACTIVE | Noted: 2024-09-26

## 2024-09-26 RX ORDER — TRAZODONE HYDROCHLORIDE 50 MG/1
50-100 TABLET, FILM COATED ORAL
Qty: 180 TABLET | Refills: 3 | Status: SHIPPED | OUTPATIENT
Start: 2024-09-26

## 2024-09-30 ENCOUNTER — TELEPHONE (OUTPATIENT)
Age: 54
End: 2024-09-30
Payer: COMMERCIAL

## 2024-09-30 DIAGNOSIS — M79.7 FIBROMYALGIA: ICD-10-CM

## 2024-09-30 DIAGNOSIS — M32.9 SLE (SYSTEMIC LUPUS ERYTHEMATOSUS RELATED SYNDROME): ICD-10-CM

## 2024-10-01 LAB
1OH-MIDAZOLAM UR QL SCN: NOT DETECTED NG/MG CREAT
6MAM UR QL SCN: NEGATIVE NG/ML
7AMINOCLONAZEPAM/CREAT UR: NOT DETECTED NG/MG CREAT
A-OH ALPRAZ/CREAT UR: NOT DETECTED NG/MG CREAT
A-OH-TRIAZOLAM/CREAT UR CFM: NOT DETECTED NG/MG CREAT
ACP UR QL CFM: NOT DETECTED
ALPRAZ/CREAT UR CFM: NOT DETECTED NG/MG CREAT
AMPHET UR CFM-MCNC: NOT DETECTED NG/MG CREAT
AMPHETAMINES UR QL SCN: NORMAL NG/ML
AMPHETAMINES UR QL: NEGATIVE
APAP UR QL SCN: NORMAL UG/ML
APAP UR QL: NORMAL
APAP UR-MCNC: PRESENT UG/ML
BARBITURATES UR QL SCN: NEGATIVE NG/ML
BENZODIAZ SCN METH UR: NEGATIVE
BUPRENORPHINE UR QL SCN: NEGATIVE
BUPRENORPHINE/CREAT UR: NOT DETECTED NG/MG CREAT
CANNABINOIDS UR QL SCN: NEGATIVE NG/ML
CARISOPRODOL UR QL: NEGATIVE NG/ML
CLONAZEPAM/CREAT UR CFM: NOT DETECTED NG/MG CREAT
COCAINE+BZE UR QL SCN: NEGATIVE NG/ML
CREAT UR-MCNC: 48 MG/DL
D-METHORPHAN UR-MCNC: NOT DETECTED NG/ML
D-METHORPHAN+LEVORPHANOL UR QL: NOT DETECTED
DESALKYLFLURAZ/CREAT UR: NOT DETECTED NG/MG CREAT
DIAZEPAM/CREAT UR: NOT DETECTED NG/MG CREAT
ETHANOL UR QL SCN: NEGATIVE G/DL
ETHANOL UR QL SCN: NEGATIVE NG/ML
FENTANYL CTO UR SCN-MCNC: NEGATIVE NG/ML
FENTANYL/CREAT UR: NOT DETECTED NG/MG CREAT
FLUNITRAZEPAM UR QL SCN: NOT DETECTED NG/MG CREAT
GABAPENTIN UR CFM-MCNC: PRESENT NG/ML
GABAPENTIN UR QL CFM: NORMAL
GABAPENTIN UR-MCNC: NORMAL UG/ML
HALLUCINOGENS UR: NEGATIVE
HYPNOTICS UR QL SCN: NEGATIVE
KETAMINE UR QL: NOT DETECTED
LORAZEPAM/CREAT UR: NOT DETECTED NG/MG CREAT
MDA UR CFM-MCNC: NOT DETECTED NG/MG CREAT
MDMA UR CFM-MCNC: NOT DETECTED NG/MG CREAT
MEPERIDINE UR QL SCN: NEGATIVE NG/ML
METHADONE UR QL SCN: NEGATIVE NG/ML
METHADONE+METAB UR QL SCN: NEGATIVE NG/ML
METHAMPHET UR CFM-MCNC: NOT DETECTED NG/MG CREAT
MIDAZOLAM/CREAT UR CFM: NOT DETECTED NG/MG CREAT
MISCELLANEOUS, UR: NEGATIVE
N-NORTRAMADOL/CREAT UR CFM: NORMAL NG/MG CREAT
NORBUPRENORPHINE/CREAT UR: NOT DETECTED NG/MG CREAT
NORDIAZEPAM/CREAT UR: NOT DETECTED NG/MG CREAT
NORFENTANYL/CREAT UR: NOT DETECTED NG/MG CREAT
NORFLUNITRAZEPAM UR-MCNC: NOT DETECTED NG/MG CREAT
NORKETAMINE UR-MCNC: NOT DETECTED UG/ML
O-NORTRAMADOL UR CFM-MCNC: 4860 NG/MG CREAT
OPIATES UR SCN-MCNC: NEGATIVE NG/ML
OXAZEPAM/CREAT UR: NOT DETECTED NG/MG CREAT
OXYCODONE CTO UR SCN-MCNC: NEGATIVE NG/ML
PCP UR QL SCN: NEGATIVE NG/ML
PRESCRIBED MEDICATIONS: NORMAL
PROPOXYPH UR QL SCN: NEGATIVE NG/ML
TAPENTADOL CTO UR SCN-MCNC: NEGATIVE NG/ML
TEMAZEPAM/CREAT UR: NOT DETECTED NG/MG CREAT
TRAMADOL UR CFM-MCNC: NORMAL NG/MG CREAT
TRAMADOL UR QL CFM: NORMAL
TRAMADOL UR QL SCN: NORMAL NG/ML
ZALEPLON UR-MCNC: NOT DETECTED NG/ML
ZOLPIDEM PHENYL-4-CARB UR QL SCN: NOT DETECTED
ZOLPIDEM UR QL SCN: NOT DETECTED
ZOPICLONE-N-OXIDE UR-MCNC: NOT DETECTED NG/ML

## 2024-10-01 RX ORDER — TRAMADOL HYDROCHLORIDE 50 MG/1
50 TABLET ORAL EVERY 6 HOURS PRN
Qty: 120 TABLET | Refills: 3 | Status: SHIPPED | OUTPATIENT
Start: 2024-10-01

## 2024-10-04 DIAGNOSIS — F90.0 ATTENTION DEFICIT HYPERACTIVITY DISORDER (ADHD), INATTENTIVE TYPE, MILD: ICD-10-CM

## 2024-10-04 RX ORDER — ATOMOXETINE 40 MG/1
40 CAPSULE ORAL DAILY
Qty: 30 CAPSULE | Refills: 2 | OUTPATIENT
Start: 2024-10-04

## 2024-10-07 LAB — REF LAB TEST METHOD: NORMAL

## 2024-10-21 ENCOUNTER — HOSPITAL ENCOUNTER (OUTPATIENT)
Dept: MAMMOGRAPHY | Facility: HOSPITAL | Age: 54
Discharge: HOME | End: 2024-10-21
Admitting: STUDENT IN AN ORGANIZED HEALTH CARE EDUCATION/TRAINING PROGRAM
Payer: COMMERCIAL

## 2024-10-21 DIAGNOSIS — Z12.31 ENCOUNTER FOR SCREENING MAMMOGRAM FOR MALIGNANT NEOPLASM OF BREAST: ICD-10-CM

## 2024-10-21 PROCEDURE — 77063 BREAST TOMOSYNTHESIS BI: CPT

## 2024-10-21 PROCEDURE — 77067 SCR MAMMO BI INCL CAD: CPT

## 2024-10-28 ENCOUNTER — TELEPHONE (OUTPATIENT)
Age: 54
End: 2024-10-28
Payer: COMMERCIAL

## 2024-12-04 ENCOUNTER — TELEPHONE (OUTPATIENT)
Age: 54
End: 2024-12-04
Payer: COMMERCIAL

## 2024-12-04 DIAGNOSIS — F90.0 ATTENTION DEFICIT HYPERACTIVITY DISORDER (ADHD), INATTENTIVE TYPE, MILD: Primary | ICD-10-CM

## 2024-12-04 NOTE — TELEPHONE ENCOUNTER
PATIENT STATES THAT HER INSURANCE WILL NOT PAY FOR HER 20 MG DOSE OF  amphetamine-dextroamphetamine XR (ADDERALL XR) 20 MG 24 hr capsule   CAN THE MG BE CHANGED TO 10 MG 2X A DAY. PLEASE CALL TO ADVISE.

## 2024-12-05 ENCOUNTER — DOCUMENTATION (OUTPATIENT)
Age: 54
End: 2024-12-05
Payer: COMMERCIAL

## 2024-12-05 RX ORDER — DEXTROAMPHETAMINE SACCHARATE, AMPHETAMINE ASPARTATE MONOHYDRATE, DEXTROAMPHETAMINE SULFATE AND AMPHETAMINE SULFATE 5; 5; 5; 5 MG/1; MG/1; MG/1; MG/1
20 CAPSULE, EXTENDED RELEASE ORAL EVERY MORNING
Qty: 30 CAPSULE | Refills: 0 | Status: SHIPPED | OUTPATIENT
Start: 2025-01-02 | End: 2025-02-01

## 2024-12-05 RX ORDER — DEXTROAMPHETAMINE SACCHARATE, AMPHETAMINE ASPARTATE MONOHYDRATE, DEXTROAMPHETAMINE SULFATE AND AMPHETAMINE SULFATE 5; 5; 5; 5 MG/1; MG/1; MG/1; MG/1
20 CAPSULE, EXTENDED RELEASE ORAL EVERY MORNING
Qty: 30 CAPSULE | Refills: 0 | Status: SHIPPED | OUTPATIENT
Start: 2025-01-30 | End: 2025-03-01

## 2024-12-05 RX ORDER — DEXTROAMPHETAMINE SACCHARATE, AMPHETAMINE ASPARTATE MONOHYDRATE, DEXTROAMPHETAMINE SULFATE AND AMPHETAMINE SULFATE 5; 5; 5; 5 MG/1; MG/1; MG/1; MG/1
20 CAPSULE, EXTENDED RELEASE ORAL EVERY MORNING
Qty: 30 CAPSULE | Refills: 0 | Status: SHIPPED | OUTPATIENT
Start: 2024-12-05 | End: 2025-01-04

## 2024-12-05 NOTE — TELEPHONE ENCOUNTER
Patient called in stating that insurance will cover generic Adderall XR 20mg, but needs to be a 90 days supply sent to [facility].

## 2024-12-30 ENCOUNTER — TELEPHONE (OUTPATIENT)
Age: 54
End: 2024-12-30
Payer: COMMERCIAL

## 2024-12-30 NOTE — TELEPHONE ENCOUNTER
PT HAD TO BE CANCELLED DUE TO A PROVIDER EMERGENCY. I HAVE REACHED OUT VIA [facility], [facility] AND PHONE (IF ABLE). IF PT CALLS BACK TO RESCHEDULE, PLEASE SCHEDULE WITH ALENA MCKOY OR GARY. IF THE PT WANTS TO SEE DR. WHITE, SHE IS BOOKED UNTIL APRIL. IF IT IS A NEW PT, CONFRIM IF THEY ARE INTRESTED IN SEEING ANOTHER PROVIDER OR IF THEY WANT TO WAIT FOR DR. WHITE. PT HAS BEEN ADDED TO THE CANCELLATION LIST AS HIGH PRIORITY.         -HUB READY TO SCHEDULE.

## 2024-12-31 DIAGNOSIS — F41.1 GENERALIZED ANXIETY DISORDER: ICD-10-CM

## 2024-12-31 DIAGNOSIS — F33.1 MAJOR DEPRESSIVE DISORDER, RECURRENT EPISODE, MODERATE: ICD-10-CM

## 2024-12-31 RX ORDER — BUPROPION HYDROCHLORIDE 450 MG/1
450 TABLET, FILM COATED, EXTENDED RELEASE ORAL DAILY
Qty: 90 TABLET | Refills: 1 | Status: SHIPPED | OUTPATIENT
Start: 2024-12-31

## 2024-12-31 RX ORDER — BUPROPION HYDROCHLORIDE 150 MG/1
150 TABLET ORAL EVERY MORNING
Qty: 30 TABLET | Refills: 2 | OUTPATIENT
Start: 2024-12-31 | End: 2025-12-31

## 2025-01-15 DIAGNOSIS — R52 PAIN MANAGEMENT: ICD-10-CM

## 2025-01-15 DIAGNOSIS — M32.9 SLE (SYSTEMIC LUPUS ERYTHEMATOSUS RELATED SYNDROME): ICD-10-CM

## 2025-01-15 DIAGNOSIS — M79.7 FIBROMYALGIA: ICD-10-CM

## 2025-01-15 DIAGNOSIS — Z79.899 OTHER LONG TERM (CURRENT) DRUG THERAPY: Primary | ICD-10-CM

## 2025-01-15 RX ORDER — METHOTREXATE 2.5 MG/1
15 TABLET ORAL WEEKLY
Qty: 24 TABLET | Refills: 1 | Status: SHIPPED | OUTPATIENT
Start: 2025-01-15

## 2025-01-15 RX ORDER — BACLOFEN 20 MG/1
20 TABLET ORAL 4 TIMES DAILY
Qty: 120 TABLET | Refills: 1 | Status: SHIPPED | OUTPATIENT
Start: 2025-01-15

## 2025-01-15 RX ORDER — GABAPENTIN 100 MG/1
CAPSULE ORAL
Qty: 150 CAPSULE | Refills: 1 | Status: SHIPPED | OUTPATIENT
Start: 2025-01-15

## 2025-01-15 RX ORDER — PREDNISONE 1 MG/1
2 TABLET ORAL DAILY
Qty: 60 TABLET | Refills: 1 | Status: SHIPPED | OUTPATIENT
Start: 2025-01-15

## 2025-01-15 RX ORDER — FOLIC ACID 1 MG/1
2000 TABLET ORAL DAILY
Qty: 180 TABLET | Refills: 1 | Status: SHIPPED | OUTPATIENT
Start: 2025-01-15

## 2025-01-15 NOTE — TELEPHONE ENCOUNTER
Pt called to have medications refilled until her next appt. Last ov 08/12/24, upcoming 04/29/25, appears last labs 08/08/2023. Please review and advise.

## 2025-01-15 NOTE — TELEPHONE ENCOUNTER
I will send in 1 month worth with 1 refill. She will need updated labs. I will place order for her to have done

## 2025-01-17 NOTE — TELEPHONE ENCOUNTER
Called and notified pt one month medication has been sent to pharmacy and that she will need to have her labs updated which an order has been placed. Pt is aware.

## 2025-01-20 ENCOUNTER — LAB (OUTPATIENT)
Dept: LAB | Facility: HOSPITAL | Age: 55
End: 2025-01-20
Payer: COMMERCIAL

## 2025-01-20 DIAGNOSIS — Z13.220 SCREENING FOR LIPID DISORDERS: Primary | ICD-10-CM

## 2025-01-20 DIAGNOSIS — Z79.899 OTHER LONG TERM (CURRENT) DRUG THERAPY: ICD-10-CM

## 2025-01-20 DIAGNOSIS — M32.9 SLE (SYSTEMIC LUPUS ERYTHEMATOSUS RELATED SYNDROME): ICD-10-CM

## 2025-01-20 DIAGNOSIS — Z13.220 SCREENING FOR LIPID DISORDERS: ICD-10-CM

## 2025-01-20 LAB
BASOPHILS # BLD AUTO: 0.04 10*3/MM3 (ref 0–0.2)
BASOPHILS NFR BLD AUTO: 0.6 % (ref 0–1.5)
BILIRUB UR QL STRIP: NEGATIVE
C3 SERPL-MCNC: 145 MG/DL (ref 82–167)
C4 SERPL-MCNC: 25 MG/DL (ref 14–44)
CHOLEST SERPL-MCNC: 230 MG/DL (ref 0–200)
CLARITY UR: CLEAR
COLOR UR: YELLOW
DEPRECATED RDW RBC AUTO: 43.5 FL (ref 37–54)
EOSINOPHIL # BLD AUTO: 0.07 10*3/MM3 (ref 0–0.4)
EOSINOPHIL NFR BLD AUTO: 1 % (ref 0.3–6.2)
ERYTHROCYTE [DISTWIDTH] IN BLOOD BY AUTOMATED COUNT: 13.4 % (ref 12.3–15.4)
GLUCOSE UR STRIP-MCNC: NEGATIVE MG/DL
HCT VFR BLD AUTO: 39 % (ref 34–46.6)
HDLC SERPL-MCNC: 82 MG/DL (ref 40–60)
HGB BLD-MCNC: 12.7 G/DL (ref 12–15.9)
HGB UR QL STRIP.AUTO: NEGATIVE
IMM GRANULOCYTES # BLD AUTO: 0.02 10*3/MM3 (ref 0–0.05)
IMM GRANULOCYTES NFR BLD AUTO: 0.3 % (ref 0–0.5)
KETONES UR QL STRIP: ABNORMAL
LDLC SERPL CALC-MCNC: 135 MG/DL (ref 0–100)
LDLC/HDLC SERPL: 1.62 {RATIO}
LEUKOCYTE ESTERASE UR QL STRIP.AUTO: ABNORMAL
LYMPHOCYTES # BLD AUTO: 1.77 10*3/MM3 (ref 0.7–3.1)
LYMPHOCYTES NFR BLD AUTO: 26 % (ref 19.6–45.3)
MCH RBC QN AUTO: 29.3 PG (ref 26.6–33)
MCHC RBC AUTO-ENTMCNC: 32.6 G/DL (ref 31.5–35.7)
MCV RBC AUTO: 90.1 FL (ref 79–97)
MONOCYTES # BLD AUTO: 0.45 10*3/MM3 (ref 0.1–0.9)
MONOCYTES NFR BLD AUTO: 6.6 % (ref 5–12)
NEUTROPHILS NFR BLD AUTO: 4.47 10*3/MM3 (ref 1.7–7)
NEUTROPHILS NFR BLD AUTO: 65.5 % (ref 42.7–76)
NITRITE UR QL STRIP: NEGATIVE
NRBC BLD AUTO-RTO: 0 /100 WBC (ref 0–0.2)
PH UR STRIP.AUTO: 6 [PH] (ref 5–8)
PLATELET # BLD AUTO: 407 10*3/MM3 (ref 140–450)
PMV BLD AUTO: 10.4 FL (ref 6–12)
PROT UR QL STRIP: ABNORMAL
RBC # BLD AUTO: 4.33 10*6/MM3 (ref 3.77–5.28)
SP GR UR STRIP: 1.02 (ref 1–1.03)
TRIGL SERPL-MCNC: 76 MG/DL (ref 0–150)
UROBILINOGEN UR QL STRIP: ABNORMAL
VLDLC SERPL-MCNC: 13 MG/DL (ref 5–40)
WBC NRBC COR # BLD AUTO: 6.82 10*3/MM3 (ref 3.4–10.8)

## 2025-01-20 PROCEDURE — 86255 FLUORESCENT ANTIBODY SCREEN: CPT

## 2025-01-20 PROCEDURE — 85025 COMPLETE CBC W/AUTO DIFF WBC: CPT

## 2025-01-20 PROCEDURE — 81001 URINALYSIS AUTO W/SCOPE: CPT

## 2025-01-20 PROCEDURE — 86160 COMPLEMENT ANTIGEN: CPT

## 2025-01-20 PROCEDURE — 36415 COLL VENOUS BLD VENIPUNCTURE: CPT

## 2025-01-20 PROCEDURE — 80061 LIPID PANEL: CPT

## 2025-01-20 RX ORDER — BUPROPION HYDROCHLORIDE 150 MG/1
150 TABLET ORAL DAILY
Qty: 90 TABLET | Refills: 3 | Status: SHIPPED | OUTPATIENT
Start: 2025-01-20

## 2025-01-20 RX ORDER — BUPROPION HYDROCHLORIDE 300 MG/1
300 TABLET ORAL DAILY
Qty: 90 TABLET | Refills: 3 | Status: SHIPPED | OUTPATIENT
Start: 2025-01-20

## 2025-01-21 LAB
BACTERIA UR QL AUTO: ABNORMAL /HPF
COD CRY URNS QL: PRESENT /HPF
HYALINE CASTS UR QL AUTO: ABNORMAL /LPF
RBC # UR STRIP: ABNORMAL /HPF
REF LAB TEST METHOD: ABNORMAL
SQUAMOUS #/AREA URNS HPF: ABNORMAL /HPF
WBC # UR STRIP: ABNORMAL /HPF

## 2025-01-22 LAB — DSDNA AB SER QL CLIF: NEGATIVE

## 2025-02-04 ENCOUNTER — TELEPHONE (OUTPATIENT)
Dept: INTERNAL MEDICINE | Facility: CLINIC | Age: 55
End: 2025-02-04
Payer: COMMERCIAL

## 2025-02-04 NOTE — TELEPHONE ENCOUNTER
BEVERLEY FROM [facility] CALLED AND STATED THAT THE ADDERALL MEDICATION THAT WAS PRESCRIBED IN 12/2024 THE PATIENT STILL HASN'T RECEIVED AND THEN NEED PERMISSION TO REVERSE OUT OF INSURANCE.  PLEASE CALL BACK REF # [id number]

## 2025-02-06 DIAGNOSIS — Z79.899 OTHER LONG TERM (CURRENT) DRUG THERAPY: ICD-10-CM

## 2025-02-06 DIAGNOSIS — M32.9 SLE (SYSTEMIC LUPUS ERYTHEMATOSUS RELATED SYNDROME): ICD-10-CM

## 2025-02-06 DIAGNOSIS — M79.7 FIBROMYALGIA: ICD-10-CM

## 2025-02-10 ENCOUNTER — TELEPHONE (OUTPATIENT)
Age: 55
End: 2025-02-10
Payer: COMMERCIAL

## 2025-02-10 DIAGNOSIS — M79.7 FIBROMYALGIA: ICD-10-CM

## 2025-02-10 DIAGNOSIS — M32.9 SLE (SYSTEMIC LUPUS ERYTHEMATOSUS RELATED SYNDROME): ICD-10-CM

## 2025-02-10 RX ORDER — METHOTREXATE 2.5 MG/1
15 TABLET ORAL WEEKLY
Qty: 72 TABLET | Refills: 0 | Status: SHIPPED | OUTPATIENT
Start: 2025-02-10

## 2025-02-10 RX ORDER — TRAMADOL HYDROCHLORIDE 50 MG/1
50 TABLET ORAL EVERY 6 HOURS PRN
Qty: 120 TABLET | Refills: 3 | Status: SHIPPED | OUTPATIENT
Start: 2025-02-10

## 2025-02-10 NOTE — TELEPHONE ENCOUNTER
DELETE AFTER REVIEWING: Send this encounter to the appropriate pool. See your Call Action Grid or Workflows for direction.    Caller: Anel Perkins    Relationship: Self        Who is your current provider: DR. WHITE        Who would you like your new provider to be: DR. SABA    What are your reasons for transferring care: DR. WHITE LEAVING

## 2025-02-10 NOTE — TELEPHONE ENCOUNTER
Caller: Anel Perkins    Relationship: Self    Best call back number: [phone]     What is the best time to reach you: ANY     Who are you requesting to speak with (clinical staff, provider,  specific staff member): CLINICAL        What was the call regarding: PATIENT WANTED TO BE SURE MEDICATION STAYS CURRENT. PATIENT WAS REQUIRED TO GET LABS TO UPDATE REFILLS. LABS HAVE BEEN DONE AND PATIENT IS DUE FOR REFILL OF   traMADol (ULTRAM) 50 MG tablet. PATIENT STATES SHE HAS A FEW DAYS OF traMADol (ULTRAM) 50 MG tablet  LEFT. PLEASE CONTACT PATIENT AND ADVISE.

## 2025-02-10 NOTE — TELEPHONE ENCOUNTER
I went ahead and called in refills but it looks like when she had labs 1/20/25 either a CMP was not done or not back yet. Please ensure she has this done ASAP if lab did not get it as this is what has her liver and kidney function on it.

## 2025-02-14 RX ORDER — TRAMADOL HYDROCHLORIDE 50 MG/1
50 TABLET ORAL EVERY 6 HOURS PRN
Qty: 120 TABLET | Refills: 3 | Status: SHIPPED | OUTPATIENT
Start: 2025-02-14

## 2025-03-17 DIAGNOSIS — G43.829 MENSTRUAL MIGRAINE, NOT INTRACTABLE, WITHOUT STATUS MIGRAINOSUS: ICD-10-CM

## 2025-03-18 RX ORDER — RIZATRIPTAN BENZOATE 10 MG/1
10 TABLET ORAL ONCE AS NEEDED
Qty: 9 TABLET | Refills: 4 | Status: SHIPPED | OUTPATIENT
Start: 2025-03-18

## 2025-03-18 NOTE — TELEPHONE ENCOUNTER
Rx Refill Note  Requested Prescriptions     Pending Prescriptions Disp Refills    rizatriptan (MAXALT) 10 MG tablet [Pharmacy Med Name: RIZATRIPTAN 10 MG TABLET] 9 tablet 4     Sig: TAKE 1 TABLET BY MOUTH 1 TIME AS NEEDED FOR MIGRAINE. MAY REPEAT IN 2 HOURS IF NEEDED      Last office visit with prescribing clinician: 9/25/2024   Last telemedicine visit with prescribing clinician: Visit date not found   Next office visit with prescribing clinician: 9/25/2025                         Would you like a call back once the refill request has been completed: [] Yes [] No    If the office needs to give you a call back, can they leave a voicemail: [] Yes [] No    Caroline Kay MA  03/18/25, 09:04 EDT

## 2025-03-19 DIAGNOSIS — F90.0 ATTENTION DEFICIT HYPERACTIVITY DISORDER (ADHD), INATTENTIVE TYPE, MILD: ICD-10-CM

## 2025-03-19 RX ORDER — DEXTROAMPHETAMINE SACCHARATE, AMPHETAMINE ASPARTATE MONOHYDRATE, DEXTROAMPHETAMINE SULFATE AND AMPHETAMINE SULFATE 5; 5; 5; 5 MG/1; MG/1; MG/1; MG/1
20 CAPSULE, EXTENDED RELEASE ORAL EVERY MORNING
Qty: 30 CAPSULE | Refills: 0 | Status: CANCELLED | OUTPATIENT
Start: 2025-03-19 | End: 2025-04-18

## 2025-03-19 RX ORDER — DEXTROAMPHETAMINE SACCHARATE, AMPHETAMINE ASPARTATE MONOHYDRATE, DEXTROAMPHETAMINE SULFATE AND AMPHETAMINE SULFATE 6.25; 6.25; 6.25; 6.25 MG/1; MG/1; MG/1; MG/1
25 CAPSULE, EXTENDED RELEASE ORAL EVERY MORNING
Qty: 30 CAPSULE | Refills: 0 | Status: SHIPPED | OUTPATIENT
Start: 2025-03-19

## 2025-03-19 NOTE — TELEPHONE ENCOUNTER
Rx Refill Note  Requested Prescriptions     Pending Prescriptions Disp Refills    amphetamine-dextroamphetamine XR (ADDERALL XR) 20 MG 24 hr capsule 30 capsule 0     Sig: Take 1 capsule by mouth Every Morning for 30 days      Last office visit with prescribing clinician: 9/9/2024   Last telemedicine visit with prescribing clinician: Visit date not found   Next office visit with prescribing clinician: Visit date not found                         Would you like a call back once the refill request has been completed: [] Yes [] No    If the office needs to give you a call back, can they leave a voicemail: [] Yes [] No    Julissa Harding LPN  03/19/25, 14:37 EDT

## 2025-03-20 DIAGNOSIS — M32.9 SLE (SYSTEMIC LUPUS ERYTHEMATOSUS RELATED SYNDROME): ICD-10-CM

## 2025-03-20 RX ORDER — PREDNISONE 1 MG/1
2 TABLET ORAL DAILY
Qty: 60 TABLET | Refills: 1 | Status: SHIPPED | OUTPATIENT
Start: 2025-03-20

## 2025-04-18 PROBLEM — L93.0 LUPUS ERYTHEMATOSUS: Status: RESOLVED | Noted: 2022-11-08 | Resolved: 2025-04-18

## 2025-04-22 DIAGNOSIS — F90.0 ATTENTION DEFICIT HYPERACTIVITY DISORDER (ADHD), INATTENTIVE TYPE, MILD: ICD-10-CM

## 2025-04-22 RX ORDER — DEXTROAMPHETAMINE SACCHARATE, AMPHETAMINE ASPARTATE MONOHYDRATE, DEXTROAMPHETAMINE SULFATE AND AMPHETAMINE SULFATE 6.25; 6.25; 6.25; 6.25 MG/1; MG/1; MG/1; MG/1
25 CAPSULE, EXTENDED RELEASE ORAL EVERY MORNING
Qty: 30 CAPSULE | Refills: 0 | Status: CANCELLED | OUTPATIENT
Start: 2025-04-22

## 2025-04-22 NOTE — TELEPHONE ENCOUNTER
Rx Refill Note  Requested Prescriptions     Pending Prescriptions Disp Refills    amphetamine-dextroamphetamine XR (Adderall XR) 25 MG 24 hr capsule 30 capsule 0     Sig: Take 1 capsule by mouth Every Morning      Last office visit with prescribing clinician: 9/9/2024   Last telemedicine visit with prescribing clinician: Visit date not found   Next office visit with prescribing clinician: 4/28/2025                         Would you like a call back once the refill request has been completed: [] Yes [x] No    If the office needs to give you a call back, can they leave a voicemail: [x] Yes [] No    Hannah Anders MA  04/22/25, 15:48 EDT      CALLED PATIENT AND SCHEDULED APPT AS WE HAVE NOT SEEN HER SINCE SEPT. PATIENT ALSO NEEDS CONTROLLED SUB AGREEMENT ON FILE, SO SHE WILL BE COMING IN PERSON.

## 2025-04-23 RX ORDER — DEXTROAMPHETAMINE SACCHARATE, AMPHETAMINE ASPARTATE MONOHYDRATE, DEXTROAMPHETAMINE SULFATE AND AMPHETAMINE SULFATE 7.5; 7.5; 7.5; 7.5 MG/1; MG/1; MG/1; MG/1
30 CAPSULE, EXTENDED RELEASE ORAL DAILY
Qty: 30 CAPSULE | Refills: 0 | Status: SHIPPED | OUTPATIENT
Start: 2025-04-23 | End: 2026-04-23

## 2025-04-24 ENCOUNTER — LAB (OUTPATIENT)
Dept: LAB | Facility: HOSPITAL | Age: 55
End: 2025-04-24
Payer: COMMERCIAL

## 2025-04-24 ENCOUNTER — TELEPHONE (OUTPATIENT)
Age: 55
End: 2025-04-24
Payer: COMMERCIAL

## 2025-04-24 DIAGNOSIS — R52 PAIN MANAGEMENT: ICD-10-CM

## 2025-04-24 DIAGNOSIS — M79.7 FIBROMYALGIA: ICD-10-CM

## 2025-04-24 DIAGNOSIS — Z79.899 OTHER LONG TERM (CURRENT) DRUG THERAPY: ICD-10-CM

## 2025-04-24 DIAGNOSIS — M32.9 SLE (SYSTEMIC LUPUS ERYTHEMATOSUS RELATED SYNDROME): ICD-10-CM

## 2025-04-24 LAB
AMPHET+METHAMPHET UR QL: POSITIVE
AMPHETAMINES UR QL: POSITIVE
BARBITURATES UR QL SCN: NEGATIVE
BASOPHILS # BLD AUTO: 0.06 10*3/MM3 (ref 0–0.2)
BASOPHILS NFR BLD AUTO: 1 % (ref 0–1.5)
BENZODIAZ UR QL SCN: NEGATIVE
BUPRENORPHINE SERPL-MCNC: NEGATIVE NG/ML
CANNABINOIDS SERPL QL: NEGATIVE
COCAINE UR QL: NEGATIVE
DEPRECATED RDW RBC AUTO: 44.4 FL (ref 37–54)
EOSINOPHIL # BLD AUTO: 0.04 10*3/MM3 (ref 0–0.4)
EOSINOPHIL NFR BLD AUTO: 0.7 % (ref 0.3–6.2)
ERYTHROCYTE [DISTWIDTH] IN BLOOD BY AUTOMATED COUNT: 13.8 % (ref 12.3–15.4)
ERYTHROCYTE [SEDIMENTATION RATE] IN BLOOD: 7 MM/HR (ref 0–30)
FENTANYL UR-MCNC: NEGATIVE NG/ML
HCT VFR BLD AUTO: 35.4 % (ref 34–46.6)
HGB BLD-MCNC: 11.8 G/DL (ref 12–15.9)
IMM GRANULOCYTES # BLD AUTO: 0.02 10*3/MM3 (ref 0–0.05)
IMM GRANULOCYTES NFR BLD AUTO: 0.3 % (ref 0–0.5)
LYMPHOCYTES # BLD AUTO: 1.78 10*3/MM3 (ref 0.7–3.1)
LYMPHOCYTES NFR BLD AUTO: 29.4 % (ref 19.6–45.3)
MCH RBC QN AUTO: 30 PG (ref 26.6–33)
MCHC RBC AUTO-ENTMCNC: 33.3 G/DL (ref 31.5–35.7)
MCV RBC AUTO: 90.1 FL (ref 79–97)
METHADONE UR QL SCN: NEGATIVE
MONOCYTES # BLD AUTO: 0.35 10*3/MM3 (ref 0.1–0.9)
MONOCYTES NFR BLD AUTO: 5.8 % (ref 5–12)
NEUTROPHILS NFR BLD AUTO: 3.8 10*3/MM3 (ref 1.7–7)
NEUTROPHILS NFR BLD AUTO: 62.8 % (ref 42.7–76)
NRBC BLD AUTO-RTO: 0 /100 WBC (ref 0–0.2)
OPIATES UR QL: NEGATIVE
OXYCODONE UR QL SCN: NEGATIVE
PCP UR QL SCN: NEGATIVE
PLATELET # BLD AUTO: 335 10*3/MM3 (ref 140–450)
PMV BLD AUTO: 10.8 FL (ref 6–12)
RBC # BLD AUTO: 3.93 10*6/MM3 (ref 3.77–5.28)
TRICYCLICS UR QL SCN: NEGATIVE
WBC NRBC COR # BLD AUTO: 6.05 10*3/MM3 (ref 3.4–10.8)

## 2025-04-24 PROCEDURE — 85652 RBC SED RATE AUTOMATED: CPT

## 2025-04-24 PROCEDURE — 80053 COMPREHEN METABOLIC PANEL: CPT

## 2025-04-24 PROCEDURE — 36415 COLL VENOUS BLD VENIPUNCTURE: CPT

## 2025-04-24 PROCEDURE — 86140 C-REACTIVE PROTEIN: CPT

## 2025-04-24 PROCEDURE — 81003 URINALYSIS AUTO W/O SCOPE: CPT

## 2025-04-24 PROCEDURE — 85025 COMPLETE CBC W/AUTO DIFF WBC: CPT

## 2025-04-24 PROCEDURE — 80307 DRUG TEST PRSMV CHEM ANLYZR: CPT

## 2025-04-24 NOTE — TELEPHONE ENCOUNTER
PT IS GOING TO HAVE LABS COMPLETED BY ANOTHER PROVIDER AND WAS CURIOUS IF [facility] HAD ANY LABS THAT SHE WANTED COMPLETED AS WELL AT THIS TIME. PLEASE GIVE PT A CALL BACK TO LET HER KNOW.

## 2025-04-25 LAB
ALBUMIN SERPL-MCNC: 4.1 G/DL (ref 3.5–5.2)
ALBUMIN/GLOB SERPL: 1.6 G/DL
ALP SERPL-CCNC: 71 U/L (ref 39–117)
ALT SERPL W P-5'-P-CCNC: 16 U/L (ref 1–33)
ANION GAP SERPL CALCULATED.3IONS-SCNC: 9.5 MMOL/L (ref 5–15)
AST SERPL-CCNC: 27 U/L (ref 1–32)
BILIRUB SERPL-MCNC: 0.3 MG/DL (ref 0–1.2)
BILIRUB UR QL STRIP: NEGATIVE
BUN SERPL-MCNC: 11 MG/DL (ref 6–20)
BUN/CREAT SERPL: 9.9 (ref 7–25)
CALCIUM SPEC-SCNC: 9.8 MG/DL (ref 8.6–10.5)
CHLORIDE SERPL-SCNC: 105 MMOL/L (ref 98–107)
CLARITY UR: ABNORMAL
CO2 SERPL-SCNC: 27.5 MMOL/L (ref 22–29)
COLOR UR: YELLOW
CREAT SERPL-MCNC: 1.11 MG/DL (ref 0.57–1)
CRP SERPL-MCNC: <0.3 MG/DL (ref 0–0.5)
EGFRCR SERPLBLD CKD-EPI 2021: 59.9 ML/MIN/1.73
GLOBULIN UR ELPH-MCNC: 2.6 GM/DL
GLUCOSE SERPL-MCNC: 125 MG/DL (ref 65–99)
GLUCOSE UR STRIP-MCNC: NEGATIVE MG/DL
HGB UR QL STRIP.AUTO: NEGATIVE
KETONES UR QL STRIP: ABNORMAL
LEUKOCYTE ESTERASE UR QL STRIP.AUTO: ABNORMAL
NITRITE UR QL STRIP: NEGATIVE
PH UR STRIP.AUTO: 6 [PH] (ref 5–8)
POTASSIUM SERPL-SCNC: 4.5 MMOL/L (ref 3.5–5.2)
PROT SERPL-MCNC: 6.7 G/DL (ref 6–8.5)
PROT UR QL STRIP: ABNORMAL
SODIUM SERPL-SCNC: 142 MMOL/L (ref 136–145)
SP GR UR STRIP: 1.02 (ref 1–1.03)
UROBILINOGEN UR QL STRIP: ABNORMAL

## 2025-04-28 ENCOUNTER — OFFICE VISIT (OUTPATIENT)
Age: 55
End: 2025-04-28
Payer: COMMERCIAL

## 2025-04-28 ENCOUNTER — TELEPHONE (OUTPATIENT)
Age: 55
End: 2025-04-28
Payer: COMMERCIAL

## 2025-04-28 VITALS
BODY MASS INDEX: 25.78 KG/M2 | HEIGHT: 64 IN | HEART RATE: 91 BPM | WEIGHT: 151 LBS | SYSTOLIC BLOOD PRESSURE: 110 MMHG | DIASTOLIC BLOOD PRESSURE: 68 MMHG | OXYGEN SATURATION: 99 %

## 2025-04-28 DIAGNOSIS — F33.1 MAJOR DEPRESSIVE DISORDER, RECURRENT EPISODE, MODERATE: ICD-10-CM

## 2025-04-28 DIAGNOSIS — F41.1 GENERALIZED ANXIETY DISORDER: ICD-10-CM

## 2025-04-28 DIAGNOSIS — F90.0 ATTENTION DEFICIT HYPERACTIVITY DISORDER (ADHD), INATTENTIVE TYPE, MILD: Primary | ICD-10-CM

## 2025-04-28 PROCEDURE — 96127 BRIEF EMOTIONAL/BEHAV ASSMT: CPT

## 2025-04-28 PROCEDURE — 99214 OFFICE O/P EST MOD 30 MIN: CPT

## 2025-04-28 NOTE — PROGRESS NOTES
Follow Up Office Visit      Patient Name: Anel Perkins  : 1970   MRN: [id number]     Referring Provider: Rema Grider DO    Chief Complaint:      ICD-10-CM ICD-9-CM   1. Attention deficit hyperactivity disorder (ADHD), inattentive type, mild  F90.0 314.01   2. Generalized anxiety disorder  F41.1 300.02   3. Major depressive disorder, recurrent episode, moderate  F33.1 296.32        History of Present Illness:   Anel Perkins is a 54 y.o. female who is here today for follow up and medication management.          Subjective      Patient Reports:   History of Present Illness  Adderall XR 25 mg was recently increased to 30 mg due to the medication only being effective until lunchtime.  She reports typically taking Adderall around 8:30 AM daily.  She has not started the increased dose due to it not being delivered from her pharmacy yet.  She continues to take Wellbutrin and reports no adverse effects. Her mood remains stable, with no reported increase in anxiety or panic attacks. She does not experience any anger outbursts, irritability, depressive symptoms, or suicidal ideation. Her sleep pattern is regular, averaging between 8 to 9 hours per night. Although her appetite has decreased, she maintains adequate food intake and snacking habits. Over the past 7 months, she has experienced a weight loss of approximately 10 pounds, which she attributes to her medication and lifestyle changes. She reports feelings of loneliness due to spending the majority of her time at home alone. She does not express any specific concerns and reports no cardiac-related side effects. However, she notes a slight increase in anxiety, manifesting as clenching behaviors, which she manages due to already wearing a mouth guard at night.     She reports taking Adderall every day.  She denies HI/AVH.    MEDICATIONS  Adderall, Wellbutrin    PHQ-9= 5 decreased score from previous visit  SUKI-7= 1 decreased score from  previous visit    Review of Systems:   Review of Systems   Constitutional:  Negative for appetite change, fatigue and unexpected weight change.   Eyes:  Negative for visual disturbance.   Respiratory:  Negative for chest tightness and shortness of breath.    Cardiovascular:  Negative for chest pain.   Musculoskeletal:  Negative for gait problem.   Skin:  Negative for rash and wound.   Neurological:  Negative for dizziness, tremors, seizures, weakness, light-headedness and headaches.   Psychiatric/Behavioral:  Positive for decreased concentration. Negative for agitation, behavioral problems, confusion, dysphoric mood, hallucinations, self-injury, sleep disturbance and suicidal ideas. The patient is not nervous/anxious and is not hyperactive.    Sleep pattern: 8-9 hours per night   Appetite: normal     PHQ-9 Depression Screening  Little interest or pleasure in doing things? Not at all   Feeling down, depressed, or hopeless? Several days   PHQ-2 Total Score 1   Trouble falling or staying asleep, or sleeping too much? Not at all   Feeling tired or having little energy? Several days   Poor appetite or overeating? Not at all   Feeling bad about yourself - or that you are a failure or have let yourself or your family down? Not at all   Trouble concentrating on things, such as reading the newspaper or watching television? Over half   Moving or speaking so slowly that other people could have noticed? Or the opposite - being so fidgety or restless that you have been moving around a lot more than usual? Several days   Thoughts that you would be better off dead, or of hurting yourself in some way? Not at all   PHQ-9 Total Score 5   If you checked off any problems, how difficult have these problems made it for you to do your work, take care of things at home, or get along with other people? Somewhat difficult       SUKI-7 Anxiety Screening  Over the last two weeks, how often have you been bothered by the following  problems?  Feeling nervous, anxious or on edge: Several days  Not being able to stop or control worrying: Not at all  Worrying too much about different things: Not at all  Trouble Relaxing: Not at all  Being so restless that it is hard to sit still: Not at all  Becoming easily annoyed or irritable: Not at all  Feeling afraid as if something awful might happen: Not at all  SUKI 7 Total Score: 1  If you checked any problems, how difficult have these problems made it for you to do your work, take care of things at home, or get along with other people: Not difficult at all    RISK ASSESSMENT:  Patient denies any thoughts or intent of suicide today. Patient denies any impulsive behavior today.     Medications:     Current Outpatient Medications:     acetaminophen (TYLENOL) 500 MG tablet, Tylenol Extra Strength 500 mg tablet  As needed, Disp: , Rfl:     acetaminophen (TYLENOL) 500 MG tablet, Take 1 tablet by mouth Every 6 (Six) Hours As Needed., Disp: , Rfl:     amphetamine-dextroamphetamine XR (ADDERALL XR) 30 MG 24 hr capsule, Take 1 capsule by mouth Daily, Disp: 30 capsule, Rfl: 0    baclofen (LIORESAL) 20 MG tablet, Take 1 tablet by mouth 4 (Four) Times a Day., Disp: 120 tablet, Rfl: 1    buPROPion XL (WELLBUTRIN XL) 150 MG 24 hr tablet, Take 1 tablet by mouth Daily., Disp: 90 tablet, Rfl: 3    buPROPion XL (WELLBUTRIN XL) 300 MG 24 hr tablet, Take 1 tablet by mouth Daily., Disp: 90 tablet, Rfl: 3    Cholecalciferol (VITAMIN D) 2000 units capsule, Take  by mouth., Disp: , Rfl:     folic acid (FOLVITE) 1 MG tablet, Take 2 tablets by mouth Daily., Disp: 180 tablet, Rfl: 1    gabapentin (NEURONTIN) 100 MG capsule, 1 in am and 1 in afternoon, and 2-3 at bedtime, Disp: 150 capsule, Rfl: 1    Ibuprofen 200 MG capsule, Take  by mouth., Disp: , Rfl:     lansoprazole (PREVACID) 30 MG capsule, , Disp: , Rfl: 0    methotrexate 2.5 MG tablet, Take 6 tablets by mouth 1 (One) Time Per Week., Disp: 72 tablet, Rfl: 0    multivitamin  "(MULTI VITAMIN DAILY PO), Take 1 tablet by mouth Daily., Disp: , Rfl:     phenylephrine (MYDFRIN) 2.5 % ophthalmic solution, Administer 1 drop to both eyes As Needed., Disp: , Rfl:     predniSONE (DELTASONE) 1 MG tablet, TAKE 2 TABLETS BY MOUTH EVERY DAY, Disp: 60 tablet, Rfl: 1    rizatriptan (MAXALT) 10 MG tablet, TAKE 1 TABLET BY MOUTH 1 TIME AS NEEDED FOR MIGRAINE. MAY REPEAT IN 2 HOURS IF NEEDED, Disp: 9 tablet, Rfl: 4    Ryaltris 665-25 MCG/ACT suspension, INSTILL 1 SPRAY INTO AFFECTED NOSTRIL(S) 3 TIMES A DAY, Disp: , Rfl:     traMADol (ULTRAM) 50 MG tablet, Take 1 tablet by mouth Every 6 (Six) Hours As Needed for Moderate Pain., Disp: 120 tablet, Rfl: 3    traZODone (DESYREL) 50 MG tablet, Take 1-2 tablets by mouth every night at bedtime., Disp: 180 tablet, Rfl: 3    Triamcinolone Acetonide (NASACORT) 55 MCG/ACT nasal inhaler, SPRAY 1 SPRAY 3 TIMES A DAY BY INTRANASAL ROUTE FOR 30 DAYS., Disp: , Rfl:     Medication Considerations:  TUCKER reviewed and appropriate.      Allergies:   No Known Allergies    Results Reviewed:   Yes      Objective     Physical Exam:  Vital Signs:   Vitals:    04/28/25 1245   BP: 110/68   Pulse: 91   SpO2: 99%   Weight: 68.5 kg (151 lb)   Height: 162.6 cm (64.02\")     Body mass index is 25.91 kg/m².     Mental Status Exam:   MENTAL STATUS EXAM   General Appearance:  Cleanly groomed and dressed and well developed  Eye Contact:  Good eye contact  Attitude:  Cooperative and polite  Motor Activity:  Normal gait, posture and fidgety  Muscle Strength:  Normal  Speech:  Normal rate, tone, volume  Language:  Spontaneous  Mood and affect:  Normal, pleasant  Hopelessness:  Denies  Loneliness: 2  Thought Process:  Logical  Associations/ Thought Content:  No delusions  Hallucinations:  None  Suicidal Ideations:  Not present  Homicidal Ideation:  Not present  Sensorium:  Alert and clear  Orientation:  Person, place, time and situation  Immediate Recall, Recent, and Remote Memory:  " Intact  Attention Span/ Concentration:  Good  Fund of Knowledge:  Appropriate for age and educational level  Intellectual Functioning:  Average range  Insight:  Good  Judgement:  Good  Reliability:  Good  Impulse Control:  Good       @RESULASTCBCDIFFPANEL,TSH,LABLIPI,[id number],IVIXEBDD35,MG,FOLATE,PROLACTIN,CRPRESULT,CMP,[id number])@    Lab Results   Component Value Date    GLUCOSE 125 (H) 04/24/2025    BUN 11 04/24/2025    CREATININE 1.11 (H) 04/24/2025    EGFR 59.9 (L) 04/24/2025    BCR 9.9 04/24/2025    K 4.5 04/24/2025    CO2 27.5 04/24/2025    CALCIUM 9.8 04/24/2025    ALBUMIN 4.1 04/24/2025    BILITOT 0.3 04/24/2025    AST 27 04/24/2025    ALT 16 04/24/2025       Lab Results   Component Value Date    WBC 6.05 04/24/2025    HGB 11.8 (L) 04/24/2025    HCT 35.4 04/24/2025    MCV 90.1 04/24/2025     04/24/2025       Lab Results   Component Value Date    CHOL 230 (H) 01/20/2025    CHLPL 204 (H) 07/12/2022    TRIG 76 01/20/2025    HDL 82 (H) 01/20/2025     (H) 01/20/2025       Assessment / Plan      Visit Diagnosis/Orders Placed This Visit:  Diagnoses and all orders for this visit:    1. Attention deficit hyperactivity disorder (ADHD), inattentive type, mild (Primary)    2. Generalized anxiety disorder    3. Major depressive disorder, recurrent episode, moderate         Assessment & Plan  1. Attention deficit hyperactivity disorder.  She reports that the Adderall wears off by lunchtime, even though she takes it daily around 8:30 AM. Her mood has been stable with no increased anxiety, panic attacks, anger outbursts, irritability, depression, or suicidal thoughts. She sleeps well, getting 8-9 hours per night, and has a slight decrease in appetite but is still eating and snacking adequately. She has lost 10 pounds over the last 7 months. She experiences some clenching and tapping behaviors, which may be related to the medication. If these symptoms worsen with the current dose, she should inform the  provider. If the medication is not effective throughout the day or causes negative side effects, a different medication may be considered.       Functional Status: Mild impairment     Prognosis: Good with Ongoing Treatment     Impression/Formulation:  Patient appeared alert and oriented.  Patient is voluntarily requesting to continue outpatient psychiatric treatment at [facility] [address]  Patient is receptive to assistance with maintaining a stable lifestyle.  Patient presents with history of     ICD-10-CM ICD-9-CM   1. Attention deficit hyperactivity disorder (ADHD), inattentive type, mild  F90.0 314.01   2. Generalized anxiety disorder  F41.1 300.02   3. Major depressive disorder, recurrent episode, moderate  F33.1 296.32   .    Reviewed patient's previous provider notes. Reviewed most recent labs. Patient meets DSM V diagnostic criteria for diagnoses. Diagnoses may be updated as more information becomes available.       Treatment Plan:   Continue Adderall XR 30mg PO qam and Wellbutrin 450mg. No refills needed  Encouraged psychotherapy  Follow-up in 3 months and as needed    Patient will continue supportive psychotherapy efforts and medications as indicated. Clinic will obtain release of information for current treatment team for continuity of care as needed. Patient will contact this office, call 911 or present to the nearest emergency room should suicidal or homicidal ideations occur.  Discussed medication options and treatment plan of prescribed medication(s) as well as the risks, benefits, and potential side effects. Patient acknowledged and verbally consented to continue with current treatment plan and was educated on the importance of compliance with treatment and follow-up appointments.     Medication options for treatment of ADHD discussed including stimulant and non-stimulant options. Extensive education is provided regarding risks associated with stimulant use  including but not limited to:, insomnia, headache, exacerbation of tics, nervousness, irritability, overstimulation, tremor, dizziness, anorexia or change in appetite, nausea, dry mouth, constipation, diarrhea, weight loss, sexual dysfunction, psychotic episodes, seizures, palpitations, tachycardia, hypertension, rare activation (activation of hypomania, nader, and/or suicidal ideations), cardiovascular adverse effects including sudden death. Patient denies any personal or family history of seizures or structural cardiac abnormalities.     Controlled substance agreement reviewed and signed by patient, Patient  is  informed that the medication is to be used by the patient only, the medication is to be used only as directed, and the medication should not be combined with other substances unless directed by a Provider/Prescriber. I advised patients to avoid taking  medication with alcohol or illicit/unprescribed substances., including THC. Patient understands that habitual use of THC while being prescribed a stimulant will result in provider discontinuing stimulant medication due to the cognition dulling effects of marijuana negating the cognitive enhancing action of the stimulant. The patient verbalizes understanding and agreement with this in their own words, and wishes to pursue proposed treatment plan.     Pt has no previous history of seizure or current eating disorder, which would be a contraindication for use. The possibility of activation with initiation was discussed and patient verbalizes understanding.      Quality Measures:   Never smoker    I advised Anel Perkins of the risks of tobacco use.     Follow Up:   Return in about 3 months (around 7/28/2025).      Patient or patient representative verbalized consent for the use of Ambient Listening during the visit with  VINH Renner for chart documentation. 4/28/2025  13:10 EDT    VINH Renner  [facility] [address]

## 2025-04-28 NOTE — TELEPHONE ENCOUNTER
ADVISED PT OF CANCELLATION AND TO CALL BACK AND ASK FOR LO IF THEY WOULD LIKE TO RESCHEDULE.    -RACHEAL

## 2025-05-05 DIAGNOSIS — F51.01 PRIMARY INSOMNIA: ICD-10-CM

## 2025-05-05 DIAGNOSIS — M79.7 FIBROMYALGIA: ICD-10-CM

## 2025-05-05 DIAGNOSIS — M32.9 SLE (SYSTEMIC LUPUS ERYTHEMATOSUS RELATED SYNDROME): ICD-10-CM

## 2025-05-05 DIAGNOSIS — R52 PAIN MANAGEMENT: ICD-10-CM

## 2025-05-05 RX ORDER — TRAZODONE HYDROCHLORIDE 50 MG/1
50-100 TABLET ORAL
Qty: 180 TABLET | Refills: 3 | OUTPATIENT
Start: 2025-05-05

## 2025-05-06 RX ORDER — BACLOFEN 20 MG/1
20 TABLET ORAL 4 TIMES DAILY
Qty: 120 TABLET | Refills: 1 | Status: SHIPPED | OUTPATIENT
Start: 2025-05-06

## 2025-05-06 RX ORDER — TRAMADOL HYDROCHLORIDE 50 MG/1
50 TABLET ORAL EVERY 6 HOURS PRN
Qty: 120 TABLET | Refills: 1 | Status: SHIPPED | OUTPATIENT
Start: 2025-05-06

## 2025-05-06 RX ORDER — GABAPENTIN 100 MG/1
CAPSULE ORAL
Qty: 150 CAPSULE | Refills: 1 | Status: SHIPPED | OUTPATIENT
Start: 2025-05-06

## 2025-05-14 DIAGNOSIS — M32.9 SLE (SYSTEMIC LUPUS ERYTHEMATOSUS RELATED SYNDROME): ICD-10-CM

## 2025-05-14 RX ORDER — PREDNISONE 1 MG/1
2 TABLET ORAL DAILY
Qty: 60 TABLET | Refills: 2 | Status: SHIPPED | OUTPATIENT
Start: 2025-05-14

## 2025-06-04 ENCOUNTER — TELEPHONE (OUTPATIENT)
Dept: INTERNAL MEDICINE | Facility: CLINIC | Age: 55
End: 2025-06-04
Payer: COMMERCIAL

## 2025-06-04 DIAGNOSIS — F90.0 ATTENTION DEFICIT HYPERACTIVITY DISORDER (ADHD), INATTENTIVE TYPE, MILD: ICD-10-CM

## 2025-06-04 RX ORDER — DEXTROAMPHETAMINE SACCHARATE, AMPHETAMINE ASPARTATE MONOHYDRATE, DEXTROAMPHETAMINE SULFATE AND AMPHETAMINE SULFATE 7.5; 7.5; 7.5; 7.5 MG/1; MG/1; MG/1; MG/1
30 CAPSULE, EXTENDED RELEASE ORAL DAILY
Qty: 30 CAPSULE | Refills: 0 | Status: SHIPPED | OUTPATIENT
Start: 2025-06-04 | End: 2026-06-04

## 2025-06-04 NOTE — TELEPHONE ENCOUNTER
Rx Refill Note  Requested Prescriptions     Pending Prescriptions Disp Refills    amphetamine-dextroamphetamine XR (ADDERALL XR) 30 MG 24 hr capsule 30 capsule 0     Sig: Take 1 capsule by mouth Daily      Last office visit with prescribing clinician: 4/28/2025   Last telemedicine visit with prescribing clinician: Visit date not found   Next office visit with prescribing clinician: 7/28/2025                         Would you like a call back once the refill request has been completed: [] Yes [] No    If the office needs to give you a call back, can they leave a voicemail: [] Yes [] No    Julissa Harding LPN  06/04/25, 10:17 EDT

## 2025-06-09 DIAGNOSIS — M79.7 FIBROMYALGIA: ICD-10-CM

## 2025-06-09 RX ORDER — BACLOFEN 20 MG/1
20 TABLET ORAL 4 TIMES DAILY
Qty: 360 TABLET | Refills: 0 | Status: SHIPPED | OUTPATIENT
Start: 2025-06-09

## 2025-06-13 ENCOUNTER — LAB (OUTPATIENT)
Dept: LAB | Facility: HOSPITAL | Age: 55
End: 2025-06-13
Payer: COMMERCIAL

## 2025-06-13 ENCOUNTER — OFFICE VISIT (OUTPATIENT)
Dept: FAMILY MEDICINE CLINIC | Facility: CLINIC | Age: 55
End: 2025-06-13
Payer: COMMERCIAL

## 2025-06-13 VITALS
WEIGHT: 141.4 LBS | HEART RATE: 94 BPM | BODY MASS INDEX: 24.14 KG/M2 | SYSTOLIC BLOOD PRESSURE: 112 MMHG | HEIGHT: 64 IN | TEMPERATURE: 95.7 F | OXYGEN SATURATION: 98 % | DIASTOLIC BLOOD PRESSURE: 62 MMHG

## 2025-06-13 DIAGNOSIS — Z79.899 OTHER LONG TERM (CURRENT) DRUG THERAPY: ICD-10-CM

## 2025-06-13 DIAGNOSIS — N39.0 RECURRENT UTI: Primary | ICD-10-CM

## 2025-06-13 DIAGNOSIS — M54.6 ACUTE BILATERAL THORACIC BACK PAIN: ICD-10-CM

## 2025-06-13 DIAGNOSIS — Z91.89 AT RISK FOR SLEEP APNEA: ICD-10-CM

## 2025-06-13 DIAGNOSIS — M32.9 SLE (SYSTEMIC LUPUS ERYTHEMATOSUS RELATED SYNDROME): ICD-10-CM

## 2025-06-13 DIAGNOSIS — M79.7 FIBROMYALGIA: ICD-10-CM

## 2025-06-13 DIAGNOSIS — G43.829 MENSTRUAL MIGRAINE WITHOUT STATUS MIGRAINOSUS, NOT INTRACTABLE: ICD-10-CM

## 2025-06-13 DIAGNOSIS — N39.0 RECURRENT UTI: ICD-10-CM

## 2025-06-13 LAB
ALBUMIN SERPL-MCNC: 4 G/DL (ref 3.5–5.2)
ALBUMIN/GLOB SERPL: 1.4 G/DL
ALP SERPL-CCNC: 74 U/L (ref 39–117)
ALT SERPL W P-5'-P-CCNC: 18 U/L (ref 1–33)
ANION GAP SERPL CALCULATED.3IONS-SCNC: 11.1 MMOL/L (ref 5–15)
AST SERPL-CCNC: 21 U/L (ref 1–32)
BASOPHILS # BLD AUTO: 0.05 10*3/MM3 (ref 0–0.2)
BASOPHILS NFR BLD AUTO: 0.9 % (ref 0–1.5)
BILIRUB BLD-MCNC: NEGATIVE MG/DL
BILIRUB SERPL-MCNC: 0.2 MG/DL (ref 0–1.2)
BUN SERPL-MCNC: 12 MG/DL (ref 6–20)
BUN/CREAT SERPL: 12 (ref 7–25)
CALCIUM SPEC-SCNC: 9.7 MG/DL (ref 8.6–10.5)
CHLORIDE SERPL-SCNC: 104 MMOL/L (ref 98–107)
CLARITY, POC: CLEAR
CO2 SERPL-SCNC: 26.9 MMOL/L (ref 22–29)
COLOR UR: YELLOW
CREAT SERPL-MCNC: 1 MG/DL (ref 0.57–1)
CRP SERPL-MCNC: <0.3 MG/DL (ref 0–0.5)
DEPRECATED RDW RBC AUTO: 47 FL (ref 37–54)
EGFRCR SERPLBLD CKD-EPI 2021: 67.9 ML/MIN/1.73
EOSINOPHIL # BLD AUTO: 0.12 10*3/MM3 (ref 0–0.4)
EOSINOPHIL NFR BLD AUTO: 2.1 % (ref 0.3–6.2)
ERYTHROCYTE [DISTWIDTH] IN BLOOD BY AUTOMATED COUNT: 13.8 % (ref 12.3–15.4)
ERYTHROCYTE [SEDIMENTATION RATE] IN BLOOD: 5 MM/HR (ref 0–30)
EXPIRATION DATE: NORMAL
GLOBULIN UR ELPH-MCNC: 2.9 GM/DL
GLUCOSE SERPL-MCNC: 94 MG/DL (ref 65–99)
GLUCOSE UR STRIP-MCNC: NEGATIVE MG/DL
HCT VFR BLD AUTO: 37.1 % (ref 34–46.6)
HGB BLD-MCNC: 11.8 G/DL (ref 12–15.9)
IMM GRANULOCYTES # BLD AUTO: 0.01 10*3/MM3 (ref 0–0.05)
IMM GRANULOCYTES NFR BLD AUTO: 0.2 % (ref 0–0.5)
KETONES UR QL: NEGATIVE
LEUKOCYTE EST, POC: NEGATIVE
LYMPHOCYTES # BLD AUTO: 1.43 10*3/MM3 (ref 0.7–3.1)
LYMPHOCYTES NFR BLD AUTO: 25.4 % (ref 19.6–45.3)
Lab: NORMAL
MCH RBC QN AUTO: 29.7 PG (ref 26.6–33)
MCHC RBC AUTO-ENTMCNC: 31.8 G/DL (ref 31.5–35.7)
MCV RBC AUTO: 93.5 FL (ref 79–97)
MONOCYTES # BLD AUTO: 0.51 10*3/MM3 (ref 0.1–0.9)
MONOCYTES NFR BLD AUTO: 9.1 % (ref 5–12)
NEUTROPHILS NFR BLD AUTO: 3.51 10*3/MM3 (ref 1.7–7)
NEUTROPHILS NFR BLD AUTO: 62.3 % (ref 42.7–76)
NITRITE UR-MCNC: NEGATIVE MG/ML
NRBC BLD AUTO-RTO: 0 /100 WBC (ref 0–0.2)
PH UR: 6 [PH] (ref 5–8)
PLATELET # BLD AUTO: 335 10*3/MM3 (ref 140–450)
PMV BLD AUTO: 10.8 FL (ref 6–12)
POTASSIUM SERPL-SCNC: 3.8 MMOL/L (ref 3.5–5.2)
PROT SERPL-MCNC: 6.9 G/DL (ref 6–8.5)
PROT UR STRIP-MCNC: NEGATIVE MG/DL
RBC # BLD AUTO: 3.97 10*6/MM3 (ref 3.77–5.28)
RBC # UR STRIP: NEGATIVE /UL
SODIUM SERPL-SCNC: 142 MMOL/L (ref 136–145)
SP GR UR: 1.01 (ref 1–1.03)
UROBILINOGEN UR QL: NORMAL
WBC NRBC COR # BLD AUTO: 5.63 10*3/MM3 (ref 3.4–10.8)

## 2025-06-13 PROCEDURE — 99214 OFFICE O/P EST MOD 30 MIN: CPT

## 2025-06-13 PROCEDURE — 80053 COMPREHEN METABOLIC PANEL: CPT

## 2025-06-13 PROCEDURE — 81003 URINALYSIS AUTO W/O SCOPE: CPT

## 2025-06-13 PROCEDURE — 85652 RBC SED RATE AUTOMATED: CPT

## 2025-06-13 PROCEDURE — 87086 URINE CULTURE/COLONY COUNT: CPT

## 2025-06-13 PROCEDURE — 85025 COMPLETE CBC W/AUTO DIFF WBC: CPT

## 2025-06-13 PROCEDURE — 36415 COLL VENOUS BLD VENIPUNCTURE: CPT

## 2025-06-13 PROCEDURE — 86140 C-REACTIVE PROTEIN: CPT

## 2025-06-13 RX ORDER — SUMATRIPTAN SUCCINATE 25 MG/1
TABLET ORAL
Qty: 9 TABLET | Refills: 1 | Status: SHIPPED | OUTPATIENT
Start: 2025-06-13

## 2025-06-13 NOTE — PROGRESS NOTES
"    Office Note     Name: Anel Perkins    : 1970     MRN: [id number]     Chief Complaint  Urinary Tract Infection and sleep study    Subjective     History of Present Illness:  Anel Perkins is a 54 y.o. female who presents today for questions about UTIs.     UTI/Back pain?:   Not currently having dysuria, hematuria, increased urinary frequency or abdominal pain. Denies hx of kidney stones. Having normal urgency and normal urine stream. Reports she thinks she is \"colonized with a certain bacteria in bloodstream\" and that her  who is a dentist is going to check her saliva. She wants to know if there is a test for bladder cancer, too.  Denies any sings of blood in urine. Thinks recurrent UTIs may be a sign she has bladder cancer. Cannot recall her last UTI.     She reports she is having some midback pain and thought this may be her kidneys. Has been ongoing for >1 week. Not worse with movement. Denies fevers, chills, chest pain or shortness of breath or dizziness. Does have a history of fibromyalgia and states \"my body hurts all the the time.\" Denies numbness, tingling, loss of bowel or bladder control, saddle anesthesias. Pain does not radiate anywhere.  Denies any known injury or falls.  Denies cough, wheezing.     Snoring and Sleeping Excessively:    told her that she snores loudly and that she does sometimes stop breathing in her sleep. Daughter reports that she has been snoring for most of her life and \"the whole house can hear it.\" She reports that she does intermittent excessive daytime sleepiness. Reports she has noticed over the past several months she has also been sleeping more than she used to at nighttime. Denies orthopnea, peripheral edema.       Migraines:   Has been on Rizatriptan as needed for her migraines which works very well.   She states that the pharmacy has been out of it for a while and she would like to try a different triptan instead. States she has taken " Sumatriptan in the past. States it worked well, too. Denies changes to her migraines.         Review of Systems:   Review of Systems   Constitutional:  Negative for chills and fever.   Respiratory:  Negative for chest tightness and shortness of breath.    Cardiovascular:  Negative for chest pain and palpitations.   Gastrointestinal:  Negative for abdominal pain, nausea and vomiting.   Genitourinary:  Negative for decreased libido, difficulty urinating, dysuria, flank pain, hematuria and urinary incontinence.   Musculoskeletal:  Positive for back pain.   Neurological:  Negative for dizziness and light-headedness.       Past Medical History:   Past Medical History:   Diagnosis Date    ADHD (attention deficit hyperactivity disorder)     Anxiety     Arthritis     DDD    Cholelithiasis     Chronic pain disorder     Depression     Dry eyes     Dyspepsia     Elbow pain     Erythematous disorder     Fibromyalgia     Fibromyalgia, primary     GERD (gastroesophageal reflux disease)     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     Hip pain     History of medical problems lupus    IBS (irritable bowel syndrome)     Kidney stone     Low back pain     Lupus     Migraine headache     Positive EKATERINA (antinuclear antibody)     PTSD (post-traumatic stress disorder)     Tendinitis     Tuberculosis     Urinary tract infection        Past Surgical History:   Past Surgical History:   Procedure Laterality Date    CHOLECYSTECTOMY      DIAGNOSTIC LAPAROSCOPY      ENDOMETRIAL ABLATION      EYE SURGERY      KIDNEY SURGERY      LASIK      MANDIBLE SURGERY      and Reset    SKIN BIOPSY      TUBAL ABDOMINAL LIGATION         Family History:   Family History   Problem Relation Age of Onset    Multiple sclerosis Mother     Arthritis Mother         lupus/fibromyalgia/ms    Depression Mother     Heart attack Father     Arthritis Sister         RA/lupus    Depression Sister     Depression Daughter     Obesity Daughter     No Known Problems  Son     Breast cancer Neg Hx     Ovarian cancer Neg Hx        Social History:   Social History     Socioeconomic History    Marital status:    Tobacco Use    Smoking status: Never     Passive exposure: Never    Smokeless tobacco: Never   Vaping Use    Vaping status: Never Used   Substance and Sexual Activity    Alcohol use: No    Drug use: No    Sexual activity: Yes     Partners: Male     Birth control/protection: Tubal ligation       Immunizations:   Immunization History   Administered Date(s) Administered    COVID-19 (MODERNA) 1st,2nd,3rd Dose Monovalent 12/30/2020, 01/27/2021, 11/17/2021    Shingrix 02/26/2024        Medications:     Current Outpatient Medications:     acetaminophen (TYLENOL) 500 MG tablet, Tylenol Extra Strength 500 mg tablet  As needed, Disp: , Rfl:     acetaminophen (TYLENOL) 500 MG tablet, Take 1 tablet by mouth Every 6 (Six) Hours As Needed., Disp: , Rfl:     amphetamine-dextroamphetamine XR (ADDERALL XR) 30 MG 24 hr capsule, Take 1 capsule by mouth Daily, Disp: 30 capsule, Rfl: 0    baclofen (LIORESAL) 20 MG tablet, Take 1 tablet by mouth 4 (Four) Times a Day., Disp: 360 tablet, Rfl: 0    buPROPion XL (WELLBUTRIN XL) 150 MG 24 hr tablet, Take 1 tablet by mouth Daily., Disp: 90 tablet, Rfl: 3    buPROPion XL (WELLBUTRIN XL) 300 MG 24 hr tablet, Take 1 tablet by mouth Daily., Disp: 90 tablet, Rfl: 3    Cholecalciferol (VITAMIN D) 2000 units capsule, Take  by mouth., Disp: , Rfl:     folic acid (FOLVITE) 1 MG tablet, Take 2 tablets by mouth Daily., Disp: 180 tablet, Rfl: 1    gabapentin (NEURONTIN) 100 MG capsule, 1 in am and 1 in afternoon, and 2-3 at bedtime, Disp: 150 capsule, Rfl: 1    Ibuprofen 200 MG capsule, Take  by mouth., Disp: , Rfl:     lansoprazole (PREVACID) 30 MG capsule, , Disp: , Rfl: 0    methotrexate 2.5 MG tablet, Take 6 tablets by mouth 1 (One) Time Per Week., Disp: 72 tablet, Rfl: 0    multivitamin (MULTI VITAMIN DAILY PO), Take 1 tablet by mouth Daily., Disp: ,  "Rfl:     phenylephrine (MYDFRIN) 2.5 % ophthalmic solution, Administer 1 drop to both eyes As Needed., Disp: , Rfl:     predniSONE (DELTASONE) 1 MG tablet, TAKE 2 TABLETS BY MOUTH EVERY DAY, Disp: 60 tablet, Rfl: 2    Ryaltris 665-25 MCG/ACT suspension, INSTILL 1 SPRAY INTO AFFECTED NOSTRIL(S) 3 TIMES A DAY, Disp: , Rfl:     traMADol (ULTRAM) 50 MG tablet, Take 1 tablet by mouth Every 6 (Six) Hours As Needed for Moderate Pain., Disp: 120 tablet, Rfl: 1    traZODone (DESYREL) 50 MG tablet, Take 1-2 tablets by mouth every night at bedtime., Disp: 180 tablet, Rfl: 3    Triamcinolone Acetonide (NASACORT) 55 MCG/ACT nasal inhaler, SPRAY 1 SPRAY 3 TIMES A DAY BY INTRANASAL ROUTE FOR 30 DAYS., Disp: , Rfl:     SUMAtriptan (IMITREX) 25 MG tablet, Take 1 dose at onset of migraine. May repeat x1 dose after 2 hours if needed., Disp: 9 tablet, Rfl: 1    Allergies:   No Known Allergies    Objective     Vital Signs  /62 (BP Location: Right arm, Patient Position: Sitting, Cuff Size: Adult)   Pulse 94   Temp 95.7 °F (35.4 °C) (Temporal)   Ht 162.6 cm (64.02\")   Wt 64.1 kg (141 lb 6.4 oz)   SpO2 98%   BMI 24.26 kg/m²   Estimated body mass index is 24.26 kg/m² as calculated from the following:    Height as of this encounter: 162.6 cm (64.02\").    Weight as of this encounter: 64.1 kg (141 lb 6.4 oz).           Physical Exam  Vitals reviewed.   Constitutional:       General: She is not in acute distress.     Appearance: Normal appearance. She is not toxic-appearing.   HENT:      Head: Normocephalic and atraumatic.      Nose: Nose normal.      Mouth/Throat:      Mouth: Mucous membranes are moist.      Pharynx: No posterior oropharyngeal erythema.   Eyes:      Extraocular Movements: Extraocular movements intact.      Pupils: Pupils are equal, round, and reactive to light.   Cardiovascular:      Rate and Rhythm: Normal rate and regular rhythm.      Pulses: Normal pulses.      Heart sounds: Normal heart sounds.   Pulmonary:     "  Effort: Pulmonary effort is normal. No respiratory distress.      Breath sounds: Normal breath sounds. No wheezing, rhonchi or rales.   Abdominal:      General: Abdomen is flat. Bowel sounds are normal.      Tenderness: There is no abdominal tenderness. There is no right CVA tenderness, left CVA tenderness, guarding or rebound.   Skin:     General: Skin is warm.   Neurological:      General: No focal deficit present.      Mental Status: She is alert and oriented to person, place, and time.   Psychiatric:         Mood and Affect: Mood normal.         Results:  No results found for this or any previous visit (from the past 24 hours).     Assessment and Plan     Assessment/Plan:  Diagnoses and all orders for this visit:    1. Recurrent UTI (Primary)  Assessment & Plan:  UA shows no signs of white blood cells, blood or nitrites. No fevers or chills.  She is currently asymptomatic.   No CVA tenderness.  Explained to patient that it is unlikely a kidney cause of her midback pain as her urinanalysis is normal.   Will check CMP and CBC.     Orders:  -     POC Urinalysis Dipstick, Automated  -     Urine Culture - Urine, Urine, Clean Catch; Future  -     Comprehensive Metabolic Panel; Future  -     CBC Auto Differential; Future  -     Urine Culture - Urine, Urine, Clean Catch    2. Acute bilateral thoracic back pain  Assessment & Plan:  Nonspecific pain. Patient sitting comfortably in the room and is in no acute distress.   No CVA tenderness. No urinary symptoms or red flag symptoms.   No radiating pain, fevers or chills.  No tenderness to her spinal process.   Recommended xray imaging of mid thoracic spine. She declined at this time.   Will try Ibuprofen, topical Lidocaine patches and heat application.   Given ER and return precautions.   Notify office of any new or acute changes.     Orders:  -     CBC Auto Differential; Future    3. At risk for sleep apnea  Assessment & Plan:  Sleep medicine referral placed.        Orders:  -     Ambulatory Referral to Sleep Medicine    4. Menstrual migraine without status migrainosus, not intractable  Assessment & Plan:  Headaches are stable.    Plan:  Discontinue the following medication/s; Rizatriptan .   and Begin taking the following medication/s; Sumatriptan .     Discussed medication dosage, use, side effects, and goals of treatment in detail.    Discussed monitoring symptoms and use of quick-relief medications and maintenance medication.  Counseled patient on lifestyle modifications to help control headaches.   Discussed risk of tachyphylaxis with overuse of OTC pain relievers     General Treatment Goals:   symptom prevention  minimize work absence  minimizing limitation in activity  prevention of exacerbations  decrease use of ER/inpatient care  minimization of adverse effects of treatment    Followup at the next regular appointment                Orders:  -     SUMAtriptan (IMITREX) 25 MG tablet; Take 1 dose at onset of migraine. May repeat x1 dose after 2 hours if needed.  Dispense: 9 tablet; Refill: 1        Follow Up  Return in about 3 months (around 9/13/2025), or if symptoms worsen or fail to improve.    Hailey Ferrer PA-C   [facility]

## 2025-06-14 ENCOUNTER — RESULTS FOLLOW-UP (OUTPATIENT)
Age: 55
End: 2025-06-14
Payer: COMMERCIAL

## 2025-06-15 LAB — BACTERIA SPEC AEROBE CULT: NO GROWTH

## 2025-06-16 ENCOUNTER — RESULTS FOLLOW-UP (OUTPATIENT)
Dept: FAMILY MEDICINE CLINIC | Facility: CLINIC | Age: 55
End: 2025-06-16
Payer: COMMERCIAL

## 2025-06-16 PROBLEM — Z91.89 AT RISK FOR SLEEP APNEA: Status: ACTIVE | Noted: 2025-06-16

## 2025-06-16 PROBLEM — M54.6 ACUTE BILATERAL THORACIC BACK PAIN: Status: ACTIVE | Noted: 2025-06-16

## 2025-06-16 PROBLEM — N39.0 RECURRENT UTI: Status: ACTIVE | Noted: 2025-06-16

## 2025-06-17 NOTE — ASSESSMENT & PLAN NOTE
Nonspecific pain. Patient sitting comfortably in the room and is in no acute distress.   No CVA tenderness. No urinary symptoms or red flag symptoms.   No radiating pain, fevers or chills.  No tenderness to her spinal process.   Recommended xray imaging of mid thoracic spine. She declined at this time.   Will try Ibuprofen, topical Lidocaine patches and heat application.   Given ER and return precautions.   Notify office of any new or acute changes.

## 2025-06-17 NOTE — ASSESSMENT & PLAN NOTE
Headaches are stable.    Plan:  Discontinue the following medication/s; Rizatriptan .   and Begin taking the following medication/s; Sumatriptan .     Discussed medication dosage, use, side effects, and goals of treatment in detail.    Discussed monitoring symptoms and use of quick-relief medications and maintenance medication.  Counseled patient on lifestyle modifications to help control headaches.   Discussed risk of tachyphylaxis with overuse of OTC pain relievers     General Treatment Goals:   symptom prevention  minimize work absence  minimizing limitation in activity  prevention of exacerbations  decrease use of ER/inpatient care  minimization of adverse effects of treatment    Followup at the next regular appointment

## 2025-06-17 NOTE — ASSESSMENT & PLAN NOTE
UA shows no signs of white blood cells, blood or nitrites. No fevers or chills.  She is currently asymptomatic.   No CVA tenderness.  Explained to patient that it is unlikely a kidney cause of her midback pain as her urinanalysis is normal.   Will check CMP and CBC.

## 2025-06-24 ENCOUNTER — TELEPHONE (OUTPATIENT)
Age: 55
End: 2025-06-24
Payer: COMMERCIAL

## 2025-06-24 NOTE — TELEPHONE ENCOUNTER
PT CALLED IN. DUE TO NEW [facility] POLICY, THEY NEED TO SPEAK WITH THE OFFICE ABOUT A POSSIBLE INTERACTION WITH HER MEDICATIONS. PLEASE CALL PHARMACY AT EARLIEST CONVENIENCE

## 2025-06-25 NOTE — TELEPHONE ENCOUNTER
I called [facility].  They have a new policy that states they have to tell us of any drug interaction and make sure we were were aware and OK with them filling.  Pt has rx for Tramadol and she's also on Baclofen.  There's an increased risk of respiratory distress and she wanted to make sure we were aware that pt was on both meds and OK to fill. I told her yes to both. Her name was Sadie. -Melina Dang, RMA

## 2025-07-02 NOTE — ASSESSMENT & PLAN NOTE
Lyrica and Tramadol.  Controlled substance agreement discussed and signed 7/22/25    PDMP reviewed  UDS today 7/22/25

## 2025-07-02 NOTE — ASSESSMENT & PLAN NOTE
Methotrexate well tolerated and effective.    Cbc,Cmp q 2 months-  6/13/25 Hgb 11.8, CMP normal, ESR/CRP normal    Would hold methotrexate for any infection or illness, Seek treatment and when well and illness is resolved you may restart medication.  Hold MTX perioperatively.   No live vaccines

## 2025-07-02 NOTE — ASSESSMENT & PLAN NOTE
Has been tender over ischium and trochanteric bursae on exam.  + anterolateral pain with flexion that could be concerning for hip joint.  No relief with R injection. Pain worsened after.    Can try salon pas lidocaine patches - no relief  Uses Voltaren and biofreeze every night.  PT for iontophoresis - helping.   If this does not improve, we may need ortho evaluation. She will let us know if she needs this.

## 2025-07-02 NOTE — ASSESSMENT & PLAN NOTE
Mild-positive EKATERINA, joint pain, rash at beginning.  Stopped methotrexate 9/15 herself.  She was on Plaquenil prior to methotrexate.  Lupus labs negative 6/16.  MTX restart 3/2023 for recurrent flares ( joint pain and swelling), LE pain.  8/23 Complements normal, dsDNA negative.     A little worse lately with increased joint pain in hands  Increase MTX to 17.5 mg weekly. Continue folic acid 2 mg every day except MTX day.  Currently on prednisone 3 mg daily. We will continue to try tapering by 0.5-1 mg each visit. She has had a hard time weaning prednisone.  Uses ibuprofen as needed.  Tylenol Arthritis 2 tabs twice daily - [employer] generic or BRAND - for joint pain.   Check labs every 8 weeks  RTC 4 months with NP with DEXA and 8 months with MD

## 2025-07-02 NOTE — ASSESSMENT & PLAN NOTE
Chronic diffuse muscle pain with intermittent sx.  Upper back flares periodically.  Waxes and wanes  S/p gabapentin    Continue Baclofen 20 mg QID PRN  Continue PRN Tramadol.  We will trial Lyrica  mg nightly in place of gabapentin    Four Cornerstones of treatment include  1- Treat Sleep- restoration.  2- Exercise- chair aerobics or chair yoga; Water aerobics. Restart these. She has been doing PT exercises.  3- Treat anxiety and depression  4- Treat pain. Non addictive muscle relaxer. Biofreeze, Massage , Heat , ICE.

## 2025-07-02 NOTE — PROGRESS NOTES
[facility] [place] Visit     Office Follow Up      Date: 07/22/2025   Patient Name: Anel Perkins  MRN: [id number]  YOB: 1970    Referring Physician: No ref. provider found     Chief Complaint   Patient presents with    Follow-up    Lupus       History of Present Illness: Anel Perkins is a 54 y.o. female who is here today for follow up on    She was last seen in clinic 8/2024 with Dr. Burleson.     Her pain is 5/10. She has 2 hours of morning stiffness.  She continues on MTX 15 mg weekly, daily folic acid 2 mg, baclofen 20 mg QID, ibuprofen as needed, prednisone 3 mg daily, and gabapentin 300 mg nightly.  She is having more joint pain in her hands.   She has been unable to wean prednisone and actually had to increase her prednisone to 3 mg daily since her last OV.  She has bursitis in her hips for which she does home exercises.  She notes her ankles seem to be more inverted and she feels she has tendinitis in her ankles that flares up with increased activity.   No recent illness/infection.     In terms of her lupus, she has not experienced any severe rashes in the sun, nose or mouth ulcers, or lung pain with breathing.   She suffers from dry eyes and uses over-the-counter eye drops. She denies any Raynaud's phenomenon.    She suffers from fibromyalgia, which causes a burning sensation beneath her skin, which she believes is causing muscle tightness. She exercises to strengthen her hips, particularly from physical therapy for bursitis. She takes baclofen for muscle tightness and gabapentin 2 to 3 tablets at bedtime. She also takes trazodone to maintain sleep. She would like to try Lyrica in place of gabapentin.    She has history of osteopenia. She will be due for DEXA with her next OV.    Subjective     No Known Allergies        Current Outpatient Medications:     acetaminophen (TYLENOL) 500 MG tablet, Tylenol Extra Strength 500 mg tablet  As  needed, Disp: , Rfl:     acetaminophen (TYLENOL) 500 MG tablet, Take 1 tablet by mouth Every 6 (Six) Hours As Needed., Disp: , Rfl:     amphetamine-dextroamphetamine XR (ADDERALL XR) 30 MG 24 hr capsule, Take 1 capsule by mouth Daily, Disp: 30 capsule, Rfl: 0    baclofen (LIORESAL) 20 MG tablet, Take 1 tablet by mouth 4 (Four) Times a Day., Disp: 360 tablet, Rfl: 0    buPROPion XL (WELLBUTRIN XL) 150 MG 24 hr tablet, Take 1 tablet by mouth Daily., Disp: 90 tablet, Rfl: 3    buPROPion XL (WELLBUTRIN XL) 300 MG 24 hr tablet, Take 1 tablet by mouth Daily., Disp: 90 tablet, Rfl: 3    Cholecalciferol (VITAMIN D) 2000 units capsule, Take  by mouth., Disp: , Rfl:     folic acid (FOLVITE) 1 MG tablet, Take 2 tablets by mouth Daily., Disp: 180 tablet, Rfl: 1    gabapentin (NEURONTIN) 100 MG capsule, TAKE 1 TABLET BY MOUTH IN THE MORNING AND 1 IN AFTERNOON, AND 2-3 AT BEDTIME, Disp: 150 capsule, Rfl: 0    Ibuprofen 200 MG capsule, Take  by mouth., Disp: , Rfl:     lansoprazole (PREVACID) 30 MG capsule, , Disp: , Rfl: 0    methotrexate 2.5 MG tablet, TAKE 6 TABLETS BY MOUTH 1 (ONE) TIME PER WEEK., Disp: 72 tablet, Rfl: 0    multivitamin (MULTI VITAMIN DAILY PO), Take 1 tablet by mouth Daily., Disp: , Rfl:     phenylephrine (MYDFRIN) 2.5 % ophthalmic solution, Administer 1 drop to both eyes As Needed., Disp: , Rfl:     predniSONE (DELTASONE) 1 MG tablet, TAKE 2 TABLETS BY MOUTH EVERY DAY, Disp: 60 tablet, Rfl: 2    rizatriptan (MAXALT) 10 MG tablet, Take 1 tablet by mouth 1 (One) Time As Needed for Migraine. May repeat dose after 2 hours as needed, Disp: , Rfl:     Ryaltris 665-25 MCG/ACT suspension, INSTILL 1 SPRAY INTO AFFECTED NOSTRIL(S) 3 TIMES A DAY, Disp: , Rfl:     traMADol (ULTRAM) 50 MG tablet, Take 1 tablet by mouth Every 6 (Six) Hours As Needed for Moderate Pain., Disp: 120 tablet, Rfl: 1    traZODone (DESYREL) 50 MG tablet, Take 1-2 tablets by mouth every night at bedtime., Disp: 180 tablet, Rfl: 3    Triamcinolone  Acetonide (NASACORT) 55 MCG/ACT nasal inhaler, SPRAY 1 SPRAY 3 TIMES A DAY BY INTRANASAL ROUTE FOR 30 DAYS., Disp: , Rfl:     Past Medical History:   Diagnosis Date    ADHD (attention deficit hyperactivity disorder)     Anxiety     Arthritis     DDD    Cholelithiasis     Chronic pain disorder     Depression     Dry eyes     Dyspepsia     Elbow pain     Erythematous disorder     Fibromyalgia     Fibromyalgia, primary     GERD (gastroesophageal reflux disease)     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     Hip pain     History of medical problems lupus    IBS (irritable bowel syndrome)     Kidney stone     Low back pain     Lupus     Migraine headache     Positive EKATERINA (antinuclear antibody)     PTSD (post-traumatic stress disorder)     Tendinitis     Tuberculosis     Urinary tract infection         Past Surgical History:   Procedure Laterality Date    CHOLECYSTECTOMY      DIAGNOSTIC LAPAROSCOPY      ENDOMETRIAL ABLATION      EYE SURGERY      KIDNEY SURGERY      LASIK      MANDIBLE SURGERY      and Reset    SKIN BIOPSY      TUBAL ABDOMINAL LIGATION         Family History   Problem Relation Age of Onset    Multiple sclerosis Mother     Arthritis Mother         lupus/fibromyalgia/ms    Depression Mother     Heart attack Father     Arthritis Sister         RA/lupus    Depression Sister     Depression Daughter     Obesity Daughter     No Known Problems Son     Breast cancer Neg Hx     Ovarian cancer Neg Hx         Social History     Socioeconomic History    Marital status:    Tobacco Use    Smoking status: Never     Passive exposure: Never    Smokeless tobacco: Never   Vaping Use    Vaping status: Never Used   Substance and Sexual Activity    Alcohol use: No    Drug use: No    Sexual activity: Yes     Partners: Male     Birth control/protection: Tubal ligation       Review of Systems   Constitutional:  Positive for activity change, appetite change, fatigue and unexpected weight gain.   HENT:  Positive  "for congestion, postnasal drip, sinus pressure, sneezing, swollen glands and trouble swallowing.         Dry mouth   Eyes:         Dry eyes   Respiratory:  Positive for apnea.    Gastrointestinal:  Positive for abdominal distention, constipation and indigestion.   Endocrine: Positive for polydipsia.   Musculoskeletal:  Positive for arthralgias, back pain, gait problem, joint swelling, myalgias, neck pain and neck stiffness.   Skin:  Positive for dry skin.        Hair loss   All other systems reviewed and are negative.       I have reviewed and updated the patient's chief complaint, history of present illness, review of systems, past medical history, surgical history, family history, social history, medications and allergy list as appropriate.     Objective    Vital Signs:   Vitals:    07/22/25 0914   BP: 110/66   Pulse: 70   Temp: 97.3 °F (36.3 °C)   Weight: 62.6 kg (138 lb)   Height: 162.6 cm (64\")   PainSc: 5    PainLoc: Generalized       Body mass index is 23.69 kg/m².   Defer to PCP        Physical Exam   Physical Exam  Patient is an alert female, in no acute distress.  Head is atraumatic and normocephalic. Pupils are round and equal. Extraocular muscles appear intact. Oropharynx appears negative.  Lungs are clear.  Heart rhythm is regular without rubs, gallops, or murmurs.  Cervical spine range of motion is slightly decreased.   She has myofascial tenderness.   PIPs are tender. Ankles and MTPs in the knees appear to be nontender.   Significant thickening of the first MTP bilaterally with slight hallux deformity.  Skin appears negative for any acute rashes.      Assessment / Plan      Assessment & Plan  SLE (systemic lupus erythematosus related syndrome)  Mild-positive EKATERINA, joint pain, rash at beginning.  Stopped methotrexate 9/15 herself.  She was on Plaquenil prior to methotrexate.  Lupus labs negative 6/16.  MTX restart 3/2023 for recurrent flares ( joint pain and swelling), LE pain.  8/23 Complements normal, " dsDNA negative.     A little worse lately with increased joint pain in hands  Increase MTX to 17.5 mg weekly. Continue folic acid 2 mg every day except MTX day.  Currently on prednisone 3 mg daily. We will continue to try tapering by 0.5-1 mg each visit. She has had a hard time weaning prednisone.  Uses ibuprofen as needed.  Tylenol Arthritis 2 tabs twice daily - [employer] generic or BRAND - for joint pain.   Check labs every 8 weeks  RTC 4 months with NP with DEXA and 8 months with MD  Other long term (current) drug therapy  Methotrexate well tolerated and effective.    Cbc,Cmp q 2 months-  6/13/25 Hgb 11.8, CMP normal, ESR/CRP normal    Would hold methotrexate for any infection or illness, Seek treatment and when well and illness is resolved you may restart medication.  Hold MTX perioperatively.   No live vaccines  Fibromyalgia  Chronic diffuse muscle pain with intermittent sx.  Upper back flares periodically.  Waxes and wanes  S/p gabapentin    Continue Baclofen 20 mg QID PRN  Continue PRN Tramadol.  We will trial Lyrica  mg nightly in place of gabapentin    Four Cornerstones of treatment include  1- Treat Sleep- restoration.  2- Exercise- chair aerobics or chair yoga; Water aerobics. Restart these. She has been doing PT exercises.  3- Treat anxiety and depression  4- Treat pain. Non addictive muscle relaxer. Biofreeze, Massage , Heat , ICE.  Pain management  Lyrica and Tramadol.  Controlled substance agreement discussed and signed 7/22/25    PDMP reviewed  UDS today 7/22/25  Osteopenia of multiple sites  DEXA scan 3/23- No compression fractures seen on VFA, Total L hip 0, L femoral neck -0.9, R femoral neck -1.1, R total hip 0.8, spine -0.3.    1000-2000IU of vitamin D 3 gel caps per day.   800-1000MG of Calcium citrate per day.  Weight bearing exercise  Update DEXA with next OV  Trochanteric bursitis of right hip  Has been tender over ischium and trochanteric bursae on exam.  + anterolateral pain with  flexion that could be concerning for hip joint.  No relief with R injection. Pain worsened after.    Can try salon pas lidocaine patches - no relief  Uses Voltaren and biofreeze every night.  PT for iontophoresis - helping.   If this does not improve, we may need ortho evaluation. She will let us know if she needs this.   Bilateral dry eyes  5/17 visit to her Eye Doctor with abnormal Schirmers test.  SSA/SSB Negative 2/2023    Stable on OTC drops   Other fatigue  Stable     Discussed plan of care in detail with the patient today.  Patient verbalized understanding and agrees.    I confirm accuracy of unchanged data/findings which have been carried forward from previous visit.  I have updated appropriately those that have changed.    TIME SPENT: I spent 40 minutes caring for the patient on this date of service.  This time includes time spent by me in the following activities: Preparing for the visit, obtaining records, reviewing/ordering tests and independently reviewing results, performing a medically appropriate history/exam, counseling and educating the patient/family/caregiver, ordering medications, tests, or procedures, and documenting information in the medical record.      Follow Up:   Return in about 4 months (around 11/22/2025) for VINH Dela Cruz, VINH Stewart, with DEXA.     VINH Dela Cruz  [facility]

## 2025-07-02 NOTE — ASSESSMENT & PLAN NOTE
5/17 visit to her Eye Doctor with abnormal Schirmers test.  SSA/SSB Negative 2/2023    Stable on OTC drops

## 2025-07-02 NOTE — ASSESSMENT & PLAN NOTE
DEXA scan 3/23- No compression fractures seen on VFA, Total L hip 0, L femoral neck -0.9, R femoral neck -1.1, R total hip 0.8, spine -0.3.    1000-2000IU of vitamin D 3 gel caps per day.   800-1000MG of Calcium citrate per day.  Weight bearing exercise  Update DEXA with next OV

## 2025-07-08 ENCOUNTER — TELEPHONE (OUTPATIENT)
Age: 55
End: 2025-07-08
Payer: COMMERCIAL

## 2025-07-08 DIAGNOSIS — F90.0 ATTENTION DEFICIT HYPERACTIVITY DISORDER (ADHD), INATTENTIVE TYPE, MILD: ICD-10-CM

## 2025-07-09 RX ORDER — DEXTROAMPHETAMINE SACCHARATE, AMPHETAMINE ASPARTATE MONOHYDRATE, DEXTROAMPHETAMINE SULFATE AND AMPHETAMINE SULFATE 7.5; 7.5; 7.5; 7.5 MG/1; MG/1; MG/1; MG/1
30 CAPSULE, EXTENDED RELEASE ORAL DAILY
Qty: 30 CAPSULE | Refills: 0 | Status: SHIPPED | OUTPATIENT
Start: 2025-07-09 | End: 2026-07-09

## 2025-07-13 DIAGNOSIS — R52 PAIN MANAGEMENT: ICD-10-CM

## 2025-07-13 DIAGNOSIS — M79.7 FIBROMYALGIA: ICD-10-CM

## 2025-07-14 RX ORDER — GABAPENTIN 100 MG/1
CAPSULE ORAL
Qty: 150 CAPSULE | Refills: 0 | Status: SHIPPED | OUTPATIENT
Start: 2025-07-14

## 2025-07-16 DIAGNOSIS — Z79.899 OTHER LONG TERM (CURRENT) DRUG THERAPY: ICD-10-CM

## 2025-07-16 DIAGNOSIS — M32.9 SLE (SYSTEMIC LUPUS ERYTHEMATOSUS RELATED SYNDROME): ICD-10-CM

## 2025-07-16 RX ORDER — METHOTREXATE 2.5 MG/1
15 TABLET ORAL WEEKLY
Qty: 72 TABLET | Refills: 0 | Status: SHIPPED | OUTPATIENT
Start: 2025-07-16

## 2025-07-22 ENCOUNTER — OFFICE VISIT (OUTPATIENT)
Age: 55
End: 2025-07-22
Payer: COMMERCIAL

## 2025-07-22 VITALS
BODY MASS INDEX: 23.56 KG/M2 | SYSTOLIC BLOOD PRESSURE: 110 MMHG | TEMPERATURE: 97.3 F | DIASTOLIC BLOOD PRESSURE: 66 MMHG | HEIGHT: 64 IN | HEART RATE: 70 BPM | WEIGHT: 138 LBS

## 2025-07-22 DIAGNOSIS — M70.61 TROCHANTERIC BURSITIS OF RIGHT HIP: ICD-10-CM

## 2025-07-22 DIAGNOSIS — M85.89 OSTEOPENIA OF MULTIPLE SITES: ICD-10-CM

## 2025-07-22 DIAGNOSIS — R53.83 OTHER FATIGUE: ICD-10-CM

## 2025-07-22 DIAGNOSIS — M79.7 FIBROMYALGIA: ICD-10-CM

## 2025-07-22 DIAGNOSIS — Z79.899 OTHER LONG TERM (CURRENT) DRUG THERAPY: ICD-10-CM

## 2025-07-22 DIAGNOSIS — H04.123 BILATERAL DRY EYES: ICD-10-CM

## 2025-07-22 DIAGNOSIS — R52 PAIN MANAGEMENT: ICD-10-CM

## 2025-07-22 DIAGNOSIS — M32.9 SLE (SYSTEMIC LUPUS ERYTHEMATOSUS RELATED SYNDROME): Primary | ICD-10-CM

## 2025-07-22 RX ORDER — METHOTREXATE 2.5 MG/1
17.5 TABLET ORAL WEEKLY
Qty: 72 TABLET | Refills: 0 | Status: SHIPPED | OUTPATIENT
Start: 2025-07-22

## 2025-07-22 RX ORDER — FOLIC ACID 1 MG/1
2000 TABLET ORAL DAILY
Qty: 180 TABLET | Refills: 1 | Status: SHIPPED | OUTPATIENT
Start: 2025-07-22

## 2025-07-22 RX ORDER — PREGABALIN 50 MG/1
50-100 CAPSULE ORAL NIGHTLY
Qty: 60 CAPSULE | Refills: 3 | Status: SHIPPED | OUTPATIENT
Start: 2025-07-22

## 2025-07-22 RX ORDER — BACLOFEN 20 MG/1
20 TABLET ORAL 4 TIMES DAILY
Qty: 360 TABLET | Refills: 1 | Status: SHIPPED | OUTPATIENT
Start: 2025-07-22

## 2025-07-22 RX ORDER — TRAMADOL HYDROCHLORIDE 50 MG/1
50 TABLET ORAL EVERY 6 HOURS PRN
Qty: 120 TABLET | Refills: 3 | Status: SHIPPED | OUTPATIENT
Start: 2025-07-22

## 2025-07-22 RX ORDER — GABAPENTIN 100 MG/1
CAPSULE ORAL
Qty: 150 CAPSULE | Refills: 0 | Status: CANCELLED | OUTPATIENT
Start: 2025-07-22

## 2025-07-22 RX ORDER — PREDNISONE 1 MG/1
3 TABLET ORAL DAILY
Qty: 90 TABLET | Refills: 3 | Status: SHIPPED | OUTPATIENT
Start: 2025-07-22

## 2025-07-22 RX ORDER — RIZATRIPTAN BENZOATE 10 MG/1
10 TABLET ORAL ONCE AS NEEDED
COMMUNITY
Start: 2025-07-13

## 2025-07-28 ENCOUNTER — OFFICE VISIT (OUTPATIENT)
Age: 55
End: 2025-07-28
Payer: COMMERCIAL

## 2025-07-28 VITALS
DIASTOLIC BLOOD PRESSURE: 64 MMHG | BODY MASS INDEX: 24.24 KG/M2 | OXYGEN SATURATION: 97 % | SYSTOLIC BLOOD PRESSURE: 112 MMHG | HEIGHT: 64 IN | HEART RATE: 83 BPM | WEIGHT: 142 LBS

## 2025-07-28 DIAGNOSIS — F90.0 ATTENTION DEFICIT HYPERACTIVITY DISORDER (ADHD), INATTENTIVE TYPE, MILD: Primary | ICD-10-CM

## 2025-07-28 PROCEDURE — 99214 OFFICE O/P EST MOD 30 MIN: CPT

## 2025-07-28 PROCEDURE — 96127 BRIEF EMOTIONAL/BEHAV ASSMT: CPT

## 2025-07-28 RX ORDER — DEXTROAMPHETAMINE SACCHARATE, AMPHETAMINE ASPARTATE MONOHYDRATE, DEXTROAMPHETAMINE SULFATE AND AMPHETAMINE SULFATE 2.5; 2.5; 2.5; 2.5 MG/1; MG/1; MG/1; MG/1
10 CAPSULE, EXTENDED RELEASE ORAL DAILY
Qty: 14 CAPSULE | Refills: 0 | Status: SHIPPED | OUTPATIENT
Start: 2025-07-28 | End: 2026-07-28

## 2025-07-28 NOTE — PROGRESS NOTES
Follow Up Office Visit      Patient Name: Anel Perkins  : 1970   MRN: [id number]     Referring Provider: Rema Grider DO    Chief Complaint:      ICD-10-CM ICD-9-CM   1. Attention deficit hyperactivity disorder (ADHD), inattentive type, mild  F90.0 314.01        History of Present Illness:   Anel Perkins is a 54 y.o. female who is here today for follow up and medication management.           Subjective      Patient Reports:   History of Present Illness  She reports a persistent struggle with focus, which she feels is not as severe as before but still significantly impacts her daily productivity. She takes Adderall at 8:30 AM but finds it less effective than desired. She has not tried any other stimulants for her ADHD. She occasionally uses Tramadol. She is open to increasing her Adderall dosage to see if there is an impact on her attention and focus. She reports no feelings of anxiety, panic attacks, or outbursts. She reports no suicidal ideation or self-harm thoughts. 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 This situation is contributing to feelings of hopelessness and frustration. However, she does not feel lonely. She believes the increased dose of Wellbutrin to 450 mg has been beneficial.    She experienced poor sleep two nights ago but generally gets 8 to 9 hours of sleep. Her appetite remains normal, and she reports no changes in her physical health.    She started Lyrica three days ago as a replacement for gabapentin.    MEDICATIONS  Current: Adderall, Wellbutrin, Lyrica  Past: gabapentin    PHQ-9= 9 increased score from previous visit  SUKI-7= 1 unchanged score from previous visit    Review of Systems:   Review of Systems   Constitutional:  Negative for appetite change, fatigue and unexpected weight change.   Eyes:  Negative for visual disturbance.   Respiratory:  Negative  for chest tightness and shortness of breath.    Cardiovascular:  Negative for chest pain.   Musculoskeletal:  Negative for gait problem.   Skin:  Negative for rash and wound.   Neurological:  Negative for dizziness, tremors, seizures, weakness, light-headedness and headaches.   Psychiatric/Behavioral:  Positive for decreased concentration. Negative for agitation, behavioral problems, confusion, dysphoric mood, hallucinations, self-injury, sleep disturbance and suicidal ideas. The patient is not nervous/anxious and is not hyperactive.    Sleep pattern: 8-9 hours per night   Appetite: normal      PHQ-9 Depression Screening  Little interest or pleasure in doing things? Several days   Feeling down, depressed, or hopeless? Several days   PHQ-2 Total Score 2   Trouble falling or staying asleep, or sleeping too much? Not at all   Feeling tired or having little energy? Almost all   Poor appetite or overeating? Not at all   Feeling bad about yourself - or that you are a failure or have let yourself or your family down? Not at all   Trouble concentrating on things, such as reading the newspaper or watching television? Almost all   Moving or speaking so slowly that other people could have noticed? Or the opposite - being so fidgety or restless that you have been moving around a lot more than usual? Several days   Thoughts that you would be better off dead, or of hurting yourself in some way? Not at all   PHQ-9 Total Score 9   If you checked off any problems, how difficult have these problems made it for you to do your work, take care of things at home, or get along with other people? Very difficult       SUKI-7 Anxiety Screening  Over the last two weeks, how often have you been bothered by the following problems?  Feeling nervous, anxious or on edge: Several days  Not being able to stop or control worrying: Not at all  Worrying too much about different things: Not at all  Trouble Relaxing: Not at all  Being so restless that it  is hard to sit still: Not at all  Becoming easily annoyed or irritable: Not at all  Feeling afraid as if something awful might happen: Not at all  SUKI 7 Total Score: 1  If you checked any problems, how difficult have these problems made it for you to do your work, take care of things at home, or get along with other people: Not difficult at all    RISK ASSESSMENT:  Patient denies any thoughts or intent of suicide today. Patient denies any impulsive behavior today.     Medications:     Current Outpatient Medications:     acetaminophen (TYLENOL) 500 MG tablet, Take 1 tablet by mouth Every 6 (Six) Hours As Needed., Disp: , Rfl:     amphetamine-dextroamphetamine XR (ADDERALL XR) 30 MG 24 hr capsule, Take 1 capsule by mouth Daily, Disp: 30 capsule, Rfl: 0    baclofen (LIORESAL) 20 MG tablet, Take 1 tablet by mouth 4 (Four) Times a Day., Disp: 360 tablet, Rfl: 1    buPROPion XL (WELLBUTRIN XL) 150 MG 24 hr tablet, Take 1 tablet by mouth Daily., Disp: 90 tablet, Rfl: 3    buPROPion XL (WELLBUTRIN XL) 300 MG 24 hr tablet, Take 1 tablet by mouth Daily., Disp: 90 tablet, Rfl: 3    Ibuprofen 200 MG capsule, Take  by mouth., Disp: , Rfl:     lansoprazole (PREVACID) 30 MG capsule, , Disp: , Rfl: 0    methotrexate 2.5 MG tablet, Take 7 tablets by mouth 1 (One) Time Per Week., Disp: 72 tablet, Rfl: 0    multivitamin (MULTI VITAMIN DAILY PO), Take 1 tablet by mouth Daily., Disp: , Rfl:     predniSONE (DELTASONE) 1 MG tablet, Take 3 tablets by mouth Daily., Disp: 90 tablet, Rfl: 3    pregabalin (LYRICA) 50 MG capsule, Take 1-2 capsules by mouth Every Night., Disp: 60 capsule, Rfl: 3    rizatriptan (MAXALT) 10 MG tablet, Take 1 tablet by mouth 1 (One) Time As Needed for Migraine. May repeat dose after 2 hours as needed, Disp: , Rfl:     traMADol (ULTRAM) 50 MG tablet, Take 1 tablet by mouth Every 6 (Six) Hours As Needed for Moderate Pain., Disp: 120 tablet, Rfl: 3    traZODone (DESYREL) 50 MG tablet, Take 1-2 tablets by mouth every  "night at bedtime., Disp: 180 tablet, Rfl: 3    acetaminophen (TYLENOL) 500 MG tablet, Tylenol Extra Strength 500 mg tablet  As needed, Disp: , Rfl:     amphetamine-dextroamphetamine XR (Adderall XR) 10 MG 24 hr capsule, Take 1 capsule by mouth Daily Take in addition to Adderall XR 30mg for total of 40mg PO qam, Disp: 14 capsule, Rfl: 0    Cholecalciferol (VITAMIN D) 2000 units capsule, Take  by mouth., Disp: , Rfl:     folic acid (FOLVITE) 1 MG tablet, Take 2 tablets by mouth Daily., Disp: 180 tablet, Rfl: 1    phenylephrine (MYDFRIN) 2.5 % ophthalmic solution, Administer 1 drop to both eyes As Needed., Disp: , Rfl:     Ryaltris 665-25 MCG/ACT suspension, INSTILL 1 SPRAY INTO AFFECTED NOSTRIL(S) 3 TIMES A DAY, Disp: , Rfl:     Triamcinolone Acetonide (NASACORT) 55 MCG/ACT nasal inhaler, SPRAY 1 SPRAY 3 TIMES A DAY BY INTRANASAL ROUTE FOR 30 DAYS., Disp: , Rfl:     Medication Considerations:  TUCKER reviewed and appropriate.      Allergies:   No Known Allergies    Results Reviewed:   Yes      Objective     Physical Exam:  Vital Signs:   Vitals:    07/28/25 1226   BP: 112/64   Pulse: 83   SpO2: 97%   Weight: 64.4 kg (142 lb)   Height: 162.6 cm (64.02\")     Body mass index is 24.36 kg/m².     Mental Status Exam:   MENTAL STATUS EXAM   General Appearance:  Cleanly groomed and dressed and well developed  Eye Contact:  Good eye contact  Attitude:  Cooperative and polite  Motor Activity:  Normal gait, posture and fidgety  Muscle Strength:  Normal  Speech:  Normal rate, tone, volume  Language:  Spontaneous  Mood and affect:  Other  Other Comment:  Stressed  Hopelessness:  2  Loneliness: Denies  Thought Process:  Logical  Associations/ Thought Content:  No delusions  Hallucinations:  None  Suicidal Ideations:  Not present  Homicidal Ideation:  Not present  Sensorium:  Alert and clear  Orientation:  Person, place, time and situation  Immediate Recall, Recent, and Remote Memory:  Intact  Attention Span/ Concentration:  " Good  Fund of Knowledge:  Appropriate for age and educational level  Intellectual Functioning:  Average range  Insight:  Good  Judgement:  Good  Reliability:  Good  Impulse Control:  Good       @RESULASTCBCDIFFPANEL,TSH,LABLIPI,[id number],YGRAIUVW45,MG,FOLATE,PROLACTIN,CRPRESULT,CMP,[id number])@    Lab Results   Component Value Date    GLUCOSE 94 06/13/2025    BUN 12.0 06/13/2025    CREATININE 1.00 06/13/2025    EGFR 67.9 06/13/2025    BCR 12.0 06/13/2025    K 3.8 06/13/2025    CO2 26.9 06/13/2025    CALCIUM 9.7 06/13/2025    ALBUMIN 4.0 06/13/2025    BILITOT 0.2 06/13/2025    AST 21 06/13/2025    ALT 18 06/13/2025       Lab Results   Component Value Date    WBC 5.63 06/13/2025    HGB 11.8 (L) 06/13/2025    HCT 37.1 06/13/2025    MCV 93.5 06/13/2025     06/13/2025       Lab Results   Component Value Date    CHOL 230 (H) 01/20/2025    CHLPL 204 (H) 07/12/2022    TRIG 76 01/20/2025    HDL 82 (H) 01/20/2025     (H) 01/20/2025       Assessment / Plan      Visit Diagnosis/Orders Placed This Visit:  Diagnoses and all orders for this visit:    1. Attention deficit hyperactivity disorder (ADHD), inattentive type, mild (Primary)  -     amphetamine-dextroamphetamine XR (Adderall XR) 10 MG 24 hr capsule; Take 1 capsule by mouth Daily Take in addition to Adderall XR 30mg for total of 40mg PO qam  Dispense: 14 capsule; Refill: 0         Assessment & Plan  1. Attention deficit hyperactivity disorder (ADHD).  She reports that her current dose of Adderall is not as effective as desired. She has not been on any other stimulants besides Adderall. A trial of Vyvanse was discussed as an alternative, but she prefers to try an increased dose of Adderall first. The Adderall dosage will be increased from 30 mg to 40 mg. If there is no improvement with the increased dose, a switch and alternative medication will be considered.    2. Depression.  She reports occasional feelings of depression but no thoughts of suicide or  self-harm. 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 The current dose of Wellbutrin 450 mg will be maintained as it has been helpful.    3. Sleep issues.  She reports not sleeping well the night before last but generally gets 8-9 hours of sleep. She recently started Lyrica 3 days ago to replace gabapentin. She will continue with Lyrica and monitor its effectiveness.      Functional Status: Mild impairment     Prognosis: Good with Ongoing Treatment     Impression/Formulation:  Patient appeared alert and oriented.  Patient is voluntarily requesting to continue outpatient psychiatric treatment at [facility] [address] [facility].  Patient is receptive to assistance with maintaining a stable lifestyle.  Patient presents with history of     ICD-10-CM ICD-9-CM   1. Attention deficit hyperactivity disorder (ADHD), inattentive type, mild  F90.0 314.01   .    Reviewed patient's previous provider notes. Reviewed most recent labs. Patient meets DSM V diagnostic criteria for diagnoses. Diagnoses may be updated as more information becomes available.       Treatment Plan:   Continue Wellbutrin 450mg PO qam   Increase Adderall XR 40mg PO qam  Encouraged psychotherapy  Follow-up in 6 weeks and as needed    Patient will continue supportive psychotherapy efforts and medications as indicated. Clinic will obtain release of information for current treatment team for continuity of care as needed. Patient will contact this office, call 911 or present to the nearest emergency room should suicidal or homicidal ideations occur.  Discussed medication options and treatment plan of prescribed medication(s) as well as the risks, benefits, and potential side effects. Patient acknowledged and verbally consented to continue with current treatment plan and was educated on the importance of compliance with treatment and follow-up appointments.     Pt has no previous  history of seizure or current eating disorder, which would be a contraindication for use. The possibility of activation with initiation was discussed and patient verbalizes understanding.    Medication options for treatment of ADHD discussed including stimulant and non-stimulant options. Extensive education is provided regarding risks associated with stimulant use including but not limited to:, insomnia, headache, exacerbation of tics, nervousness, irritability, overstimulation, tremor, dizziness, anorexia or change in appetite, nausea, dry mouth, constipation, diarrhea, weight loss, sexual dysfunction, psychotic episodes, seizures, palpitations, tachycardia, hypertension, rare activation (activation of hypomania, nader, and/or suicidal ideations), cardiovascular adverse effects including sudden death. Patient denies any personal or family history of seizures or structural cardiac abnormalities.     Controlled substance agreement reviewed and signed by patient, Patient  is  informed that the medication is to be used by the patient only, the medication is to be used only as directed, and the medication should not be combined with other substances unless directed by a Provider/Prescriber. I advised patients to avoid taking  medication with alcohol or illicit/unprescribed substances., including THC. Patient understands that habitual use of THC while being prescribed a stimulant will result in provider discontinuing stimulant medication due to the cognition dulling effects of marijuana negating the cognitive enhancing action of the stimulant. The patient verbalizes understanding and agreement with this in their own words, and wishes to pursue proposed treatment plan.         Quality Measures:   Never smoker    I advised Anel Perkins of the risks of tobacco use.     Follow Up:   Return in about 6 weeks (around 9/8/2025).      Patient or patient representative verbalized consent for the use of Ambient Listening during  the visit with  VINH Renner for chart documentation. 7/28/2025  13:56 EDT    VINH Renner  [facility] [address]    Answers submitted by the patient for this visit:  Chronic Condition Follow-up (Submitted on 7/27/2025)  Chief Complaint: PCP follow-up  Medication compliance: all of the time  Side effects: dry mouth, weight loss  Treatment barriers: no complaince problems  Exercise: weekly

## 2025-08-15 ENCOUNTER — TELEPHONE (OUTPATIENT)
Dept: INTERNAL MEDICINE | Facility: CLINIC | Age: 55
End: 2025-08-15
Payer: COMMERCIAL

## 2025-08-20 ENCOUNTER — OFFICE VISIT (OUTPATIENT)
Dept: FAMILY MEDICINE CLINIC | Facility: CLINIC | Age: 55
End: 2025-08-20
Payer: COMMERCIAL

## 2025-08-20 VITALS
DIASTOLIC BLOOD PRESSURE: 71 MMHG | BODY MASS INDEX: 24.69 KG/M2 | HEART RATE: 81 BPM | OXYGEN SATURATION: 100 % | WEIGHT: 144.6 LBS | HEIGHT: 64 IN | SYSTOLIC BLOOD PRESSURE: 102 MMHG

## 2025-08-20 DIAGNOSIS — F51.01 PRIMARY INSOMNIA: ICD-10-CM

## 2025-08-20 DIAGNOSIS — F90.0 ATTENTION DEFICIT HYPERACTIVITY DISORDER (ADHD), PREDOMINANTLY INATTENTIVE TYPE: ICD-10-CM

## 2025-08-20 DIAGNOSIS — R07.81 RIB PAIN ON RIGHT SIDE: Primary | ICD-10-CM

## 2025-08-20 DIAGNOSIS — E78.2 MIXED HYPERLIPIDEMIA: ICD-10-CM

## 2025-08-20 DIAGNOSIS — Z51.81 THERAPEUTIC DRUG MONITORING: ICD-10-CM

## 2025-08-20 DIAGNOSIS — Z12.31 ENCOUNTER FOR SCREENING MAMMOGRAM FOR MALIGNANT NEOPLASM OF BREAST: ICD-10-CM

## 2025-08-20 DIAGNOSIS — G43.829 MENSTRUAL MIGRAINE WITHOUT STATUS MIGRAINOSUS, NOT INTRACTABLE: ICD-10-CM

## 2025-08-20 PROBLEM — Z00.00 ANNUAL PHYSICAL EXAM: Status: RESOLVED | Noted: 2024-09-26 | Resolved: 2025-08-20

## 2025-08-20 PROBLEM — M54.6 ACUTE BILATERAL THORACIC BACK PAIN: Status: RESOLVED | Noted: 2025-06-16 | Resolved: 2025-08-20

## 2025-08-20 LAB
BILIRUB UR QL STRIP: NEGATIVE
CLARITY UR: ABNORMAL
COLOR UR: YELLOW
GLUCOSE UR STRIP-MCNC: NEGATIVE MG/DL
HGB UR QL STRIP.AUTO: NEGATIVE
KETONES UR QL STRIP: ABNORMAL
LEUKOCYTE ESTERASE UR QL STRIP.AUTO: ABNORMAL
NITRITE UR QL STRIP: NEGATIVE
PH UR STRIP.AUTO: 6 [PH] (ref 5–8)
PROT UR QL STRIP: ABNORMAL
SP GR UR STRIP: 1.02 (ref 1–1.03)
UROBILINOGEN UR QL STRIP: ABNORMAL

## 2025-08-20 PROCEDURE — 81003 URINALYSIS AUTO W/O SCOPE: CPT | Performed by: NURSE PRACTITIONER

## 2025-08-20 RX ORDER — PREDNISONE 20 MG/1
TABLET ORAL
Qty: 19 TABLET | Refills: 0 | Status: SHIPPED | OUTPATIENT
Start: 2025-08-20 | End: 2025-09-02

## 2025-08-20 RX ORDER — BUPROPION HYDROCHLORIDE 150 MG/1
150 TABLET ORAL DAILY
Qty: 90 TABLET | Refills: 3 | Status: SHIPPED | OUTPATIENT
Start: 2025-08-20

## 2025-08-20 RX ORDER — TRAZODONE HYDROCHLORIDE 50 MG/1
50-100 TABLET ORAL
Qty: 180 TABLET | Refills: 3 | Status: SHIPPED | OUTPATIENT
Start: 2025-08-20

## 2025-08-20 RX ORDER — LISDEXAMFETAMINE DIMESYLATE 30 MG/1
30 CAPSULE ORAL EVERY MORNING
Qty: 30 CAPSULE | Refills: 0 | Status: SHIPPED | OUTPATIENT
Start: 2025-08-20

## 2025-08-20 RX ORDER — BUPROPION HYDROCHLORIDE 300 MG/1
300 TABLET ORAL DAILY
Qty: 90 TABLET | Refills: 3 | Status: SHIPPED | OUTPATIENT
Start: 2025-08-20

## 2025-08-26 ENCOUNTER — PRIOR AUTHORIZATION (OUTPATIENT)
Dept: FAMILY MEDICINE CLINIC | Facility: CLINIC | Age: 55
End: 2025-08-26
Payer: COMMERCIAL

## 2025-08-26 LAB — DRUGS UR: NORMAL
